# Patient Record
Sex: FEMALE | Race: WHITE | NOT HISPANIC OR LATINO | Employment: FULL TIME | URBAN - NONMETROPOLITAN AREA
[De-identification: names, ages, dates, MRNs, and addresses within clinical notes are randomized per-mention and may not be internally consistent; named-entity substitution may affect disease eponyms.]

---
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
## 2022-07-24 ENCOUNTER — HOSPITAL ENCOUNTER (EMERGENCY)
Facility: MEDICAL CENTER | Age: 22
End: 2022-07-24
Attending: EMERGENCY MEDICINE
Payer: MEDICAID

## 2022-07-24 VITALS
OXYGEN SATURATION: 95 % | HEIGHT: 65 IN | BODY MASS INDEX: 44.98 KG/M2 | SYSTOLIC BLOOD PRESSURE: 145 MMHG | RESPIRATION RATE: 18 BRPM | HEART RATE: 100 BPM | WEIGHT: 270 LBS | DIASTOLIC BLOOD PRESSURE: 84 MMHG | TEMPERATURE: 98 F

## 2022-07-24 DIAGNOSIS — H10.212 CHEMICAL CONJUNCTIVITIS OF LEFT EYE: ICD-10-CM

## 2022-07-24 DIAGNOSIS — I10 HYPERTENSION, UNSPECIFIED TYPE: ICD-10-CM

## 2022-07-24 PROCEDURE — 99284 EMERGENCY DEPT VISIT MOD MDM: CPT

## 2022-07-24 PROCEDURE — 700101 HCHG RX REV CODE 250: Performed by: EMERGENCY MEDICINE

## 2022-07-24 RX ORDER — PROPARACAINE HYDROCHLORIDE 5 MG/ML
1 SOLUTION/ DROPS OPHTHALMIC ONCE
Status: COMPLETED | OUTPATIENT
Start: 2022-07-24 | End: 2022-07-24

## 2022-07-24 RX ORDER — ERYTHROMYCIN 5 MG/G
1 OINTMENT OPHTHALMIC 3 TIMES DAILY
Qty: 3.5 G | Refills: 0 | Status: SHIPPED | OUTPATIENT
Start: 2022-07-24 | End: 2022-07-29

## 2022-07-24 RX ADMIN — FLUORESCEIN SODIUM 1 MG: 1 STRIP OPHTHALMIC at 20:28

## 2022-07-24 RX ADMIN — PROPARACAINE HYDROCHLORIDE 1 DROP: 5 SOLUTION/ DROPS OPHTHALMIC at 20:28

## 2022-07-25 NOTE — ED TRIAGE NOTES
"Chief Complaint   Patient presents with   • Eye Injury     Pt got  in left eye today at work. Pt flushed eye prior too EMS arrival. + Blurred vision. + burning pain. - redness.      BP (!) 180/91   Pulse (!) 108   Temp 36.7 °C (98 °F) (Temporal)   Resp 16   Ht 1.651 m (5' 5\")   Wt 122 kg (270 lb)   SpO2 96%   BMI 44.93 kg/m²     "
No

## 2022-07-25 NOTE — ED PROVIDER NOTES
ED Provider Note    Scribed for Sukhi Coello M.D. by Salvador Cuba. 7/24/2022, 7:38 PM.    Primary care provider: Pcp Pt States None  Means of arrival: EMS  History obtained from: Patient   History limited by: None     CHIEF COMPLAINT  Chief Complaint   Patient presents with   • Eye Injury     Pt got  in left eye today at work. Pt flushed eye prior too EMS arrival. + Blurred vision. + burning pain. - redness.      HPI  Katie Graham is a 22 y.o. female who presents to the Emergency Department for an acute eye injury which occurred at 4:40 PM. Patient reports she was at work when she got splashed in the eye with industrial strength . She states she then attempted to rinse out her left eye for 20 minutes. Patient states she then walked to a Urgent Care which was closed. Due to this and increasing discomfort in her left eye, the patient was prompted to call EMS who irrigated the eye with saline and then presented the patient to the ED. She reports associated blurred vision in the left eye, left eye burning and left eye pain. There are no exacerbating factors. She denies associated left eye redness, right eye pain, right eye redness.     REVIEW OF SYSTEMS  As above.    PAST MEDICAL HISTORY   has a past medical history of Allergy, Anxiety, Depression, Diabetes ([facility]), Family history of diabetes mellitus (11/4/2020), GERD (gastroesophageal reflux disease), Head ache, Migraine, and Substance abuse ([facility]).    SURGICAL HISTORY   has a past surgical history that includes laparoscopic umbilical hernia repair (2002).    SOCIAL HISTORY  Social History     Tobacco Use   • Smoking status: Former Smoker     Packs/day: 0.10     Start date: 8/14/2018     Quit date: 11/4/2018     Years since quitting: 3.7   • Smokeless tobacco: Never Used   • Tobacco comment: Quit after 3 months   Vaping Use   • Vaping Use: Former   • Substances: Flavoring, 0 anali    • Devices: Refillable tank   Substance Use Topics   •  Alcohol use: Not Currently     Alcohol/week: 3.6 oz     Types: 6 Shots of liquor per week     Comment: 6 shots every few months   • Drug use: Not Currently     Types: Marijuana, Inhaled     Comment: every couple of weeks       Social History     Substance and Sexual Activity   Drug Use Not Currently   • Types: Marijuana, Inhaled    Comment: every couple of weeks        FAMILY HISTORY  Family History   Problem Relation Age of Onset   • Diabetes Mother 30        Type 2   • Alcohol abuse Mother         sober 18 months   • Hypertension Father    • Alcohol abuse Father    • Kidney Disease Father    • Diabetes Sister         type 2   • Genetic Disorder Sister         club foot   • No Known Problems Brother    • Lung Disease Maternal Aunt    • Alcohol abuse Maternal Aunt    • Cancer Maternal Grandmother         Lung   • Alcohol abuse Maternal Grandmother    • Diabetes Maternal Grandfather         type 2   • Heart Disease Maternal Grandfather    • Hypertension Maternal Grandfather    • Hyperlipidemia Maternal Grandfather    • Alcohol abuse Maternal Grandfather    • Heart Disease Paternal Grandmother    • Hypertension Paternal Grandmother    • Hyperlipidemia Paternal Grandmother    • Alcohol abuse Paternal Grandfather    • No Known Problems Sister    • Cancer Maternal great-grandmother         breast       CURRENT MEDICATIONS  Current Outpatient Medications   Medication Instructions   • Alcohol Swabs Wipe site with prep pad prior to injection.   • ARIPiprazole (ABILIFY) 10 mg, Oral, DAILY   • Blood Glucose Meter Kit Test blood sugar as recommended by provider. One Touch Ultra Mini blood glucose monitoring kit.   • Blood Glucose Test Strips Use one One Touch Ultra Mini strip to test blood sugar once daily early morning before first meal.   • famotidine (PEPCID) 20 mg, Oral, DAILY   • ferrous sulfate 325 mg, Oral, DAILY   • glipiZIDE (GLUCOTROL) 5 mg, Oral, EVERY MORNING BEFORE BREAKFAST   • hydrOXYzine HCl (ATARAX) 25 MG Tab  "1 Tablet, Oral, 2 TIMES DAILY   • Lancets Use one One Touch Ultra Mini lancet to test blood sugar once daily early morning before first meal.   • oxcarbazepine (TRILEPTAL) 600 MG tablet 1 Tablet, Oral, 2 TIMES DAILY   • prazosin (MINIPRESS) 2 mg, Oral, EVERY EVENING     ALLERGIES  Allergies   Allergen Reactions   • Apple Photosensitivity and Unspecified     Causes migraines and fainting. Large quantity causes seizures made worse by exposure to light   • Pomegranate Extract [Punica] Hives       PHYSICAL EXAM  VITAL SIGNS: BP (!) 180/91   Pulse (!) 108   Temp 36.7 °C (98 °F) (Temporal)   Resp 16   Ht 1.651 m (5' 5\")   Wt 122 kg (270 lb)   SpO2 96%   BMI 44.93 kg/m²     Constitutional: Well developed, Well nourished, No acute distress, Non-toxic appearance.   HENT: Normocephalic, Atraumatic, Bilateral external ears normal, bilateral tympanic membranes normal, Oropharynx moist, No oral exudates, Nose normal.   Eyes:  Pupils are equal, round and reactive to light. Extraocular motions intact. pH between 7 and 8.    Lids/Lashes: Normal, no discharge   Sclera: Slightly erythematous on the left.   Cornea: Normal, pristine, no signs of abrasion.   Anterior Chamber: Normal   Angle: Normal   Visual Acuity: Left is 20/50t, Right is 20/30, Bilateral is 20/50  Neck: Supple, no cervical lymphadenopathy, no meningeal signs..   Lymphatic: No lymphadenopathy noted.   Cardiovascular: Regular rate and rhythm without murmurs gallops or rubs.   Thorax & Lungs: No respiratory distress. Lungs are clear to auscultation bilaterally, there are no wheezes no rales. Chest wall is nontender.  Skin: Warm, Dry, No erythema,   Extremities: Intact distal pulses, no clubbing, no cyanosis, no edema, nontender.    COURSE & MEDICAL DECISION MAKING  Pertinent Labs & Imaging studies reviewed. (See chart for details)    7:38 PM - Patient seen and examined at bedside. Discussed plan of care with patient. Patient is understanding and agreeable with " plan. Patient will be treated with Opthaine 0.5% 1 drop, fluorescein 1 strip. Poison control case number is [id number].     8:23 PM - Patient was reevaluated at bedside. Visual exam performed as detailed above. Advised patient to use moisturizing drops to help alleviate symptoms. Informed patient she will be prescribed eye ointment for her left eye discomfort. Further advised patient to follow up with occupational health or her eye doctor. I then informed the patient of my plan for discharge, which includes strict return precautions for any new or worsening symptoms. She understands and verbalizes agreement to plan of care. She is comfortable going home at this time.      Decision Making:  Patient presents for evaluation.  Clinically there is just some mild irritation the sclera slightly injected but no signs of corneal abrasions.  Initial pH upon evaluation was between 7 and 8 which is in the normal range.  At this point I did irrigate her out with another liter of normal saline.  Again repeat pH is normal.  I do feel at this point the patient is stable for discharge I will start her with some ophthalmic ointment of erythromycin.  Recommend to follow-up with occupational health tomorrow for recheck and follow-up with optometrist or ophthalmologist as needed.    The patient will return for new or worsening symptoms and is stable at the time of discharge.    The patient is referred to a primary physician for blood pressure management, diabetic screening, and for all other preventative health concerns.    DISPOSITION:  Patient will be discharged home in stable condition.    FOLLOW UP:  [facility]  [address] [facility] [address] [phone]        Sean Prado M.D.  [address]  [facility] [address]  [phone]            OUTPATIENT MEDICATIONS:  Discharge Medication List as of 7/24/2022  8:45 PM      START taking these medications    Details   erythromycin 5 MG/GM Ointment Apply 1 cm to left  eye 3 times a day for 5 days., Disp-3.5 g, R-0, Normal               FINAL IMPRESSION  1. Chemical conjunctivitis of left eye    2. Hypertension, unspecified type          I, Salvador Cuba (Joseibliz), am scribing for, and in the presence of, Sukhi Coello M.D..    Electronically signed by: Salvador Cuba (Scribe), 7/24/2022    ISukhi M.D. personally performed the services described in this documentation, as scribed by Salvador Cuba in my presence, and it is both accurate and complete.    The note accurately reflects work and decisions made by me.  Sukhi Coello M.D.  7/24/2022  10:12 PM

## 2022-07-25 NOTE — ED NOTES
Discharge instructions discussed with pt, verbalizes understanding. All belongings with pt when leaving unit. Pt amb to lobby with steady gait.

## 2022-09-10 ENCOUNTER — APPOINTMENT (OUTPATIENT)
Dept: RADIOLOGY | Facility: MEDICAL CENTER | Age: 22
End: 2022-09-10
Attending: STUDENT IN AN ORGANIZED HEALTH CARE EDUCATION/TRAINING PROGRAM
Payer: MEDICAID

## 2022-09-10 ENCOUNTER — HOSPITAL ENCOUNTER (EMERGENCY)
Facility: MEDICAL CENTER | Age: 22
End: 2022-09-11
Attending: STUDENT IN AN ORGANIZED HEALTH CARE EDUCATION/TRAINING PROGRAM
Payer: MEDICAID

## 2022-09-10 DIAGNOSIS — K76.9 HEPATOCELLULAR INJURY: ICD-10-CM

## 2022-09-10 DIAGNOSIS — E86.0 DEHYDRATION: ICD-10-CM

## 2022-09-10 DIAGNOSIS — R55 SYNCOPE AND COLLAPSE: ICD-10-CM

## 2022-09-10 DIAGNOSIS — E87.1 HYPONATREMIA: ICD-10-CM

## 2022-09-10 DIAGNOSIS — R40.4 TRANSIENT ALTERATION OF AWARENESS: ICD-10-CM

## 2022-09-10 DIAGNOSIS — U07.1 COVID-19: ICD-10-CM

## 2022-09-10 LAB
ALBUMIN SERPL BCP-MCNC: 4.1 G/DL (ref 3.2–4.9)
ALBUMIN/GLOB SERPL: 1.1 G/DL
ALP SERPL-CCNC: 59 U/L (ref 30–99)
ALT SERPL-CCNC: 145 U/L (ref 2–50)
ANION GAP SERPL CALC-SCNC: 14 MMOL/L (ref 7–16)
APPEARANCE UR: CLEAR
AST SERPL-CCNC: 111 U/L (ref 12–45)
BASOPHILS # BLD AUTO: 0.7 % (ref 0–1.8)
BASOPHILS # BLD: 0.04 K/UL (ref 0–0.12)
BILIRUB SERPL-MCNC: 0.3 MG/DL (ref 0.1–1.5)
BILIRUB UR QL STRIP.AUTO: NEGATIVE
BUN SERPL-MCNC: 7 MG/DL (ref 8–22)
CALCIUM SERPL-MCNC: 8.9 MG/DL (ref 8.5–10.5)
CHLORIDE SERPL-SCNC: 100 MMOL/L (ref 96–112)
CO2 SERPL-SCNC: 20 MMOL/L (ref 20–33)
COLOR UR: YELLOW
CREAT SERPL-MCNC: 0.98 MG/DL (ref 0.5–1.4)
EKG IMPRESSION: NORMAL
EOSINOPHIL # BLD AUTO: 0.01 K/UL (ref 0–0.51)
EOSINOPHIL NFR BLD: 0.2 % (ref 0–6.9)
ERYTHROCYTE [DISTWIDTH] IN BLOOD BY AUTOMATED COUNT: 43.1 FL (ref 35.9–50)
FLUAV RNA SPEC QL NAA+PROBE: NEGATIVE
FLUBV RNA SPEC QL NAA+PROBE: NEGATIVE
GFR SERPLBLD CREATININE-BSD FMLA CKD-EPI: 85 ML/MIN/1.73 M 2
GLOBULIN SER CALC-MCNC: 3.9 G/DL (ref 1.9–3.5)
GLUCOSE SERPL-MCNC: 115 MG/DL (ref 65–99)
GLUCOSE UR STRIP.AUTO-MCNC: NEGATIVE MG/DL
HCT VFR BLD AUTO: 37.8 % (ref 37–47)
HGB BLD-MCNC: 12.9 G/DL (ref 12–16)
IMM GRANULOCYTES # BLD AUTO: 0.03 K/UL (ref 0–0.11)
IMM GRANULOCYTES NFR BLD AUTO: 0.5 % (ref 0–0.9)
KETONES UR STRIP.AUTO-MCNC: ABNORMAL MG/DL
LACTATE SERPL-SCNC: 1.3 MMOL/L (ref 0.5–2)
LEUKOCYTE ESTERASE UR QL STRIP.AUTO: NEGATIVE
LYMPHOCYTES # BLD AUTO: 0.82 K/UL (ref 1–4.8)
LYMPHOCYTES NFR BLD: 14.8 % (ref 22–41)
MCH RBC QN AUTO: 28.2 PG (ref 27–33)
MCHC RBC AUTO-ENTMCNC: 34.1 G/DL (ref 33.6–35)
MCV RBC AUTO: 82.5 FL (ref 81.4–97.8)
MICRO URNS: ABNORMAL
MONOCYTES # BLD AUTO: 0.83 K/UL (ref 0–0.85)
MONOCYTES NFR BLD AUTO: 15 % (ref 0–13.4)
NEUTROPHILS # BLD AUTO: 3.82 K/UL (ref 2–7.15)
NEUTROPHILS NFR BLD: 68.8 % (ref 44–72)
NITRITE UR QL STRIP.AUTO: NEGATIVE
NRBC # BLD AUTO: 0 K/UL
NRBC BLD-RTO: 0 /100 WBC
PH UR STRIP.AUTO: 5.5 [PH] (ref 5–8)
PLATELET # BLD AUTO: 269 K/UL (ref 164–446)
PMV BLD AUTO: 10.5 FL (ref 9–12.9)
POTASSIUM SERPL-SCNC: 3.7 MMOL/L (ref 3.6–5.5)
PROT SERPL-MCNC: 8 G/DL (ref 6–8.2)
PROT UR QL STRIP: NEGATIVE MG/DL
RBC # BLD AUTO: 4.58 M/UL (ref 4.2–5.4)
RBC UR QL AUTO: NEGATIVE
RSV RNA SPEC QL NAA+PROBE: NEGATIVE
SARS-COV-2 RNA RESP QL NAA+PROBE: DETECTED
SODIUM SERPL-SCNC: 134 MMOL/L (ref 135–145)
SP GR UR STRIP.AUTO: 1.01
SPECIMEN SOURCE: ABNORMAL
UROBILINOGEN UR STRIP.AUTO-MCNC: 0.2 MG/DL
WBC # BLD AUTO: 5.6 K/UL (ref 4.8–10.8)

## 2022-09-10 PROCEDURE — 99285 EMERGENCY DEPT VISIT HI MDM: CPT

## 2022-09-10 PROCEDURE — 87040 BLOOD CULTURE FOR BACTERIA: CPT

## 2022-09-10 PROCEDURE — 93005 ELECTROCARDIOGRAM TRACING: CPT

## 2022-09-10 PROCEDURE — 80053 COMPREHEN METABOLIC PANEL: CPT

## 2022-09-10 PROCEDURE — 85025 COMPLETE CBC W/AUTO DIFF WBC: CPT

## 2022-09-10 PROCEDURE — 87086 URINE CULTURE/COLONY COUNT: CPT

## 2022-09-10 PROCEDURE — 70450 CT HEAD/BRAIN W/O DYE: CPT

## 2022-09-10 PROCEDURE — 0241U HCHG SARS-COV-2 COVID-19 NFCT DS RESP RNA 4 TRGT MIC: CPT

## 2022-09-10 PROCEDURE — 83605 ASSAY OF LACTIC ACID: CPT

## 2022-09-10 PROCEDURE — 36415 COLL VENOUS BLD VENIPUNCTURE: CPT

## 2022-09-10 PROCEDURE — C9803 HOPD COVID-19 SPEC COLLECT: HCPCS | Performed by: STUDENT IN AN ORGANIZED HEALTH CARE EDUCATION/TRAINING PROGRAM

## 2022-09-10 PROCEDURE — 81003 URINALYSIS AUTO W/O SCOPE: CPT

## 2022-09-10 PROCEDURE — 700105 HCHG RX REV CODE 258: Performed by: STUDENT IN AN ORGANIZED HEALTH CARE EDUCATION/TRAINING PROGRAM

## 2022-09-10 PROCEDURE — 71045 X-RAY EXAM CHEST 1 VIEW: CPT

## 2022-09-10 RX ORDER — SODIUM CHLORIDE, SODIUM LACTATE, POTASSIUM CHLORIDE, CALCIUM CHLORIDE 600; 310; 30; 20 MG/100ML; MG/100ML; MG/100ML; MG/100ML
1000 INJECTION, SOLUTION INTRAVENOUS ONCE
Status: COMPLETED | OUTPATIENT
Start: 2022-09-10 | End: 2022-09-11

## 2022-09-10 RX ADMIN — SODIUM CHLORIDE, POTASSIUM CHLORIDE, SODIUM LACTATE AND CALCIUM CHLORIDE 1000 ML: 600; 310; 30; 20 INJECTION, SOLUTION INTRAVENOUS at 20:06

## 2022-09-11 VITALS
SYSTOLIC BLOOD PRESSURE: 155 MMHG | BODY MASS INDEX: 46.65 KG/M2 | HEIGHT: 65 IN | OXYGEN SATURATION: 96 % | TEMPERATURE: 98.1 F | WEIGHT: 280 LBS | HEART RATE: 97 BPM | RESPIRATION RATE: 20 BRPM | DIASTOLIC BLOOD PRESSURE: 87 MMHG

## 2022-09-11 NOTE — DISCHARGE INSTRUCTIONS
Please call Dr. Solorio's office in order to schedule follow-up.  I am not exactly sure why you passed out today or do not have a memory of it and it is important that you follow-up with a neurologist in order to obtain more testing.  Please return to the ER immediately if you have any recurrent episodes, have any persistent confusion, headache, fevers or any new or acute concern.

## 2022-09-11 NOTE — ED TRIAGE NOTES
Katie Graham  22 y.o.  Chief Complaint   Patient presents with    ALOC     BIBA for above, pt reporting getting off work around 1700 this afternoon and woke up to lying on the floor with her coworker looking over her. Per EMS, coworkers found pt wandering around parking lot and was not responsive to them. Pt A&Ox4, GCS 15 at this time. Pt reporting similar episode yesterday.

## 2022-09-11 NOTE — ED PROVIDER NOTES
ED Provider Note    CHIEF COMPLAINT  Chief Complaint   Patient presents with    ALOC       HPI  Katie Graham is a 22 y.o. female who presents after brief episode of loss of consciousness.  She states that she was at work around 5 PM and she remembers bringing her coworkers soda.  Next thing she remembers waking up on the ground around 830. Per triage she was seen wondering around outside. She does not remember anything that happened in between.  She states that she has had an otherwise pretty normal day she did have an asthma exacerbation this morning that was triggered by client with severest tobacco smoke.  She is also had a 4 /10, constant, bifrontal temporal headache.  She states that she has had a history of headaches before.  She did use a nicotine vape earlier today but otherwise denies any drinking or drug use.  She states that she thinks she had a similar episode yesterday where she had a brief fall but she says she does not have any memory loss surrounding the event.  She felt warm earlier today but denies any chills.  She feels nauseous but denies any vomiting.  She has had coughing in the setting of her asthma exacerbation.    REVIEW OF SYSTEMS  As per HPI, otherwise a 10 point review of systems is negative    PAST MEDICAL HISTORY  Past Medical History:   Diagnosis Date    Allergy     Apples cause migrains and fainting    Anxiety     Depression     Diabetes (HCC)     Family history of diabetes mellitus 11/4/2020    GERD (gastroesophageal reflux disease)     Head ache     Migraine     Substance abuse (HCC)     xanax and alcohol in highschool       SOCIAL HISTORY  Social History     Tobacco Use    Smoking status: Former     Packs/day: 0.10     Types: Cigarettes     Start date: 8/14/2018     Quit date: 11/4/2018     Years since quitting: 3.8    Smokeless tobacco: Never    Tobacco comments:     Quit after 3 months   Vaping Use    Vaping Use: Some days    Substances: Nicotine, Flavoring, 0 anali     Devices:  "[employer] tank   Substance Use Topics    Alcohol use: Not Currently     Alcohol/week: 3.6 oz     Types: 6 Shots of liquor per week     Comment: 6 shots every few months    Drug use: Not Currently     Types: Marijuana, Inhaled     Comment: every couple of weeks        SURGICAL HISTORY  Past Surgical History:   Procedure Laterality Date    LAPAROSCOPIC UMBILICAL HERNIA REPAIR  2002       CURRENT MEDICATIONS  Home Medications       Reviewed by Zully Hood R.N. (Registered Nurse) on 09/10/22 at 1934  Med List Status: Not Addressed     Medication Last Dose Status   Alcohol Swabs  Active   ARIPiprazole (ABILIFY) 10 MG Tab  Active   Blood Glucose Meter Kit  Active   Blood Glucose Test Strips  Active   famotidine (PEPCID) 20 MG Tab  Active   ferrous sulfate 325 (65 Fe) MG tablet  Active   glipiZIDE (GLUCOTROL) 5 MG Tab  Active   hydrOXYzine HCl (ATARAX) 25 MG Tab  Active   Lancets  Active   oxcarbazepine (TRILEPTAL) 600 MG tablet  Active   prazosin (MINIPRESS) 2 MG Cap  Active                    ALLERGIES  Allergies   Allergen Reactions    Apple Photosensitivity and Unspecified     Causes migraines and fainting. Large quantity causes seizures made worse by exposure to light    Pomegranate Extract [Punica] Hives       PHYSICAL EXAM  VITAL SIGNS: BP (!) 155/87   Pulse 97   Temp 36.7 °C (98.1 °F) (Temporal)   Resp 20   Ht 1.651 m (5' 5\")   Wt (!) 127 kg (280 lb)   SpO2 96%   BMI 46.59 kg/m²    Constitutional: Awake and alert . Non toxic  HENT: Normal inspection  Eyes: Normal inspection  Neck: Grossly normal range of motion.  Cardiovascular: Normal heart rate, Normal rhythm.  Symmetric peripheral pulses.   Thorax & Lungs: No respiratory distress, No wheezing, No rales, No rhonchi, No chest tenderness.   Abdomen: Soft, non-distended, nontender, no mass  Skin: No obvious rash.  Back: No tenderness, No CVA tenderness.   Extremities: Warm, well perfused. No clubbing, cyanosis, edema,   Neurologic:  A&O x 4  CN " II-XII tested and intact.  Sensation intact to light touch in all extremities.  Motor: Normal tone and bulk. No abnormal movements appreciated. No pronator drift. Strength tested and 5/5 in bilateral wrist flexion/extension, elbow flexion/extension, shoulder abduction, straight leg raise, knee flexion/extension, ankle dorsiflexion/plantarflexion. Patient ambulates with a steady gait.  Coordination: Finger to nose and heel to shin testing intact bilaterally.  Psychiatric: Normal for situation    RADIOLOGY/PROCEDURES  DX-CHEST-PORTABLE (1 VIEW)   Final Result      No evidence of acute cardiopulmonary process.      CT-HEAD W/O   Final Result      No acute intracranial findings.                    Imaging is interpreted by radiologist    Labs:  Results for orders placed or performed during the hospital encounter of 09/10/22   Lactic acid (lactate)   Result Value Ref Range    Lactic Acid 1.3 0.5 - 2.0 mmol/L   CBC WITH DIFFERENTIAL   Result Value Ref Range    WBC 5.6 4.8 - 10.8 K/uL    RBC 4.58 4.20 - 5.40 M/uL    Hemoglobin 12.9 12.0 - 16.0 g/dL    Hematocrit 37.8 37.0 - 47.0 %    MCV 82.5 81.4 - 97.8 fL    MCH 28.2 27.0 - 33.0 pg    MCHC 34.1 33.6 - 35.0 g/dL    RDW 43.1 35.9 - 50.0 fL    Platelet Count 269 164 - 446 K/uL    MPV 10.5 9.0 - 12.9 fL    Neutrophils-Polys 68.80 44.00 - 72.00 %    Lymphocytes 14.80 (L) 22.00 - 41.00 %    Monocytes 15.00 (H) 0.00 - 13.40 %    Eosinophils 0.20 0.00 - 6.90 %    Basophils 0.70 0.00 - 1.80 %    Immature Granulocytes 0.50 0.00 - 0.90 %    Nucleated RBC 0.00 /100 WBC    Neutrophils (Absolute) 3.82 2.00 - 7.15 K/uL    Lymphs (Absolute) 0.82 (L) 1.00 - 4.80 K/uL    Monos (Absolute) 0.83 0.00 - 0.85 K/uL    Eos (Absolute) 0.01 0.00 - 0.51 K/uL    Baso (Absolute) 0.04 0.00 - 0.12 K/uL    Immature Granulocytes (abs) 0.03 0.00 - 0.11 K/uL    NRBC (Absolute) 0.00 K/uL   COMP METABOLIC PANEL   Result Value Ref Range    Sodium 134 (L) 135 - 145 mmol/L    Potassium 3.7 3.6 - 5.5 mmol/L     Chloride 100 96 - 112 mmol/L    Co2 20 20 - 33 mmol/L    Anion Gap 14.0 7.0 - 16.0    Glucose 115 (H) 65 - 99 mg/dL    Bun 7 (L) 8 - 22 mg/dL    Creatinine 0.98 0.50 - 1.40 mg/dL    Calcium 8.9 8.5 - 10.5 mg/dL    AST(SGOT) 111 (H) 12 - 45 U/L    ALT(SGPT) 145 (H) 2 - 50 U/L    Alkaline Phosphatase 59 30 - 99 U/L    Total Bilirubin 0.3 0.1 - 1.5 mg/dL    Albumin 4.1 3.2 - 4.9 g/dL    Total Protein 8.0 6.0 - 8.2 g/dL    Globulin 3.9 (H) 1.9 - 3.5 g/dL    A-G Ratio 1.1 g/dL   URINALYSIS    Specimen: Urine   Result Value Ref Range    Color Yellow     Character Clear     Specific Gravity 1.014 <1.035    Ph 5.5 5.0 - 8.0    Glucose Negative Negative mg/dL    Ketones Trace (A) Negative mg/dL    Protein Negative Negative mg/dL    Bilirubin Negative Negative    Urobilinogen, Urine 0.2 Negative    Nitrite Negative Negative    Leukocyte Esterase Negative Negative    Occult Blood Negative Negative    Micro Urine Req see below    COV-2, FLU A/B, AND RSV BY PCR (2-4 HOURS CEPHEID): Collect NP swab in VTM    Specimen: Respirate   Result Value Ref Range    Influenza virus A RNA Negative Negative    Influenza virus B, PCR Negative Negative    RSV, PCR Negative Negative    SARS-CoV-2 by PCR DETECTED (AA)     SARS-CoV-2 Source NP Swab    ESTIMATED GFR   Result Value Ref Range    GFR (CKD-EPI) 85 >60 mL/min/1.73 m 2   EKG   Result Value Ref Range    Report       [facility] Emergency Dept[facility]    Test Date:  2022-09-10  Pt Name:    IDALMIS NELSON              Department: ER  MRN:        [id number]                      Room:        24  Gender:     Female                       Technician: [id number]  :        2000                   Requested By:DELFIN COLINDRES  Order #:    [id number]                    Reading MD:    Measurements  Intervals                                Axis  Rate:       114                          P:          18  CA:         141                          QRS:        -14  QRSD:       94                            T:          8  QT:         333  QTc:        459    Interpretive Statements  Sinus tachycardia  No previous ECG available for comparison       The EKG was reviewed by me and interpreted as documented above      Medications   lactated ringers (LR) bolus (0 mL Intravenous Stopped 9/11/22 0003)       Measures:  HYDRATION: Based on the patient's presentation of Dehydration the patient was given IV fluids. IV Hydration was used because oral hydration was not adequate alone. Upon recheck following hydration, the patient was improved.    COURSE & MEDICAL DECISION MAKING    This is a 22-year-old who presents for brief episode of altered awareness.  On arrival she is tachycardic, afebrile and mentating normally.  She has no neurological deficits.  Differential is broad and includes syncope, seizure, electrolyte derangement, TIA, CVA, intracranial mass or hemorrhage among others.  I have low suspicion for CNS infection as she is afebrile without any altered mental status or meningismus.  EKG without dysrhythmia, ischemia, prolonged QT, delta wave, bruggada pattern, episolon wave, RH strain or LVH.  She denies any chest pain or shortness of breath and I do not suspect pulmonary embolism, ACS or other acute cardiopulmonary process.  Her labs are notable for sodium of 134, no urinary tract infection, no leukocytosis and a normal lactate.  Her LFTs are also mildly elevated though this appears to be chronic.  CT head without evidence of intracranial pathology.  Given her tachycardia she was given a liter of fluid with significant improvement.  Her COVID swab later resulted as positive which likely explains her presentation today.   I did consult Dr. Galan with neurology to discuss if there would be any utility for obtaining an MRI or an EEG as an inpatient .  He feels this would be low yield given that she is and continues to be neurologically normal during her time in the ER.   I overall do agree with this and feel that  she can be safely discharged and managed outpatient.  The contact information for Dr. Galan was provided and she was instructed to follow-up closely as an outpatient.  She was advised to quarantine at home until symptom resolution and return sooner if worsening..      FINAL IMPRESSION  1. Dehydration Acute       2. Syncope and collapse Acute       3. Transient alteration of awareness Acute       4. Hyponatremia Acute       5. Hepatocellular injury        6. COVID-19 Acute               This dictation was created using voice recognition software. The accuracy of the dictation is limited to the abilities of the software.  The nursing notes were reviewed and certain aspects of this information were incorporated into this note.      Electronically signed by: Selin Brennan M.D., 9/10/2022 8:06 PM

## 2022-09-11 NOTE — ED NOTES
Assist RN:   Phleb at bedside to draw second set of blood cultures.  PIV established and labs & first set of cultures drawn and sent.

## 2022-09-13 LAB
BACTERIA UR CULT: NORMAL
SIGNIFICANT IND 70042: NORMAL
SITE SITE: NORMAL
SOURCE SOURCE: NORMAL

## 2022-09-15 LAB
BACTERIA BLD CULT: NORMAL
BACTERIA BLD CULT: NORMAL
SIGNIFICANT IND 70042: NORMAL
SIGNIFICANT IND 70042: NORMAL
SITE SITE: NORMAL
SITE SITE: NORMAL
SOURCE SOURCE: NORMAL
SOURCE SOURCE: NORMAL

## 2023-01-27 ENCOUNTER — OFFICE VISIT (OUTPATIENT)
Dept: URGENT CARE | Facility: PHYSICIAN GROUP | Age: 23
End: 2023-01-27
Payer: MEDICAID

## 2023-01-27 ENCOUNTER — APPOINTMENT (OUTPATIENT)
Dept: RADIOLOGY | Facility: IMAGING CENTER | Age: 23
End: 2023-01-27
Attending: FAMILY MEDICINE
Payer: MEDICAID

## 2023-01-27 VITALS
SYSTOLIC BLOOD PRESSURE: 128 MMHG | BODY MASS INDEX: 46.65 KG/M2 | OXYGEN SATURATION: 95 % | DIASTOLIC BLOOD PRESSURE: 82 MMHG | RESPIRATION RATE: 18 BRPM | HEART RATE: 106 BPM | TEMPERATURE: 98.3 F | HEIGHT: 65 IN | WEIGHT: 280 LBS

## 2023-01-27 DIAGNOSIS — Q66.89 CLUBFOOT OF LEFT LOWER EXTREMITY: ICD-10-CM

## 2023-01-27 DIAGNOSIS — S99.912A INJURY OF LEFT ANKLE, INITIAL ENCOUNTER: ICD-10-CM

## 2023-01-27 DIAGNOSIS — S93.402A SPRAIN OF LEFT ANKLE, UNSPECIFIED LIGAMENT, INITIAL ENCOUNTER: ICD-10-CM

## 2023-01-27 DIAGNOSIS — M25.572 LEFT ANKLE PAIN, UNSPECIFIED CHRONICITY: ICD-10-CM

## 2023-01-27 PROCEDURE — 99214 OFFICE O/P EST MOD 30 MIN: CPT | Performed by: FAMILY MEDICINE

## 2023-01-27 PROCEDURE — 73610 X-RAY EXAM OF ANKLE: CPT | Mod: TC,FY,LT | Performed by: FAMILY MEDICINE

## 2023-01-27 ASSESSMENT — FIBROSIS 4 INDEX: FIB4 SCORE: 0.69

## 2023-01-27 NOTE — PROGRESS NOTES
Chief Complaint:    Chief Complaint   Patient presents with    Ankle Pain     Left ankle, fell down the stairs this morning. Hx club foot.         History of Present Illness:    Already had some left ankle problems going on, but then she fell down stairs 2 hours ago, making left ankle pain worse, lateral aspect. Took Ibuprofen 6 hours ago due to previous left ankle problem before the fall today.    Was seeing Podiatry at age 19, then aged out because turned 20. Was supposed to get SMO (ankle brace) and possibly surgery due to club foot-related condition / flat top talus. Would like to see Podiatry again as she has been having left ankle pain x 1 month.      Past Medical History:    Past Medical History:   Diagnosis Date    Allergy     Apples cause migrains and fainting    Anxiety     Depression     Diabetes ([facility])     Family history of diabetes mellitus 2020    GERD (gastroesophageal reflux disease)     Head ache     Migraine     Substance abuse ([facility])     xanax and alcohol in highschool     Past Surgical History:    Past Surgical History:   Procedure Laterality Date    LAPAROSCOPIC UMBILICAL HERNIA REPAIR       Social History:    Social History     Socioeconomic History    Marital status: Single     Spouse name: Not on file    Number of children: 0    Years of education: Not on file    Highest education level: 12th grade   Occupational History    Occupation: unemployed   Tobacco Use    Smoking status: Former     Packs/day: 0.10     Types: Cigarettes     Start date: 2018     Quit date: 2018     Years since quittin.2    Smokeless tobacco: Never    Tobacco comments:     Quit after 3 months   Vaping Use    Vaping Use: Some days    Substances: Nicotine, Flavoring, 0 anali     Devices: Refillable tank   Substance and Sexual Activity    Alcohol use: Not Currently     Alcohol/week: 3.6 oz     Types: 6 Shots of liquor per week     Comment: 6 shots every few months    Drug use: Not Currently     Types:  Marijuana, Inhaled     Comment: every couple of weeks     Sexual activity: Not Currently     Partners: Female, Male     Birth control/protection: Condom Male   Other Topics Concern    Behavioral problems Not Asked    Interpersonal relationships Not Asked    Sad or not enjoying activities Not Asked    Suicidal thoughts Not Asked    Poor school performance Not Asked    Reading difficulties Not Asked    Speech difficulties Not Asked    Writing difficulties Not Asked    Inadequate sleep Not Asked    Excessive TV viewing Not Asked    Excessive video game use Not Asked    Inadequate exercise Not Asked    Sports related Not Asked    Poor diet Not Asked    Family concerns for drug/alcohol abuse Not Asked    Poor oral hygiene Not Asked    Bike safety Not Asked    Family concerns vehicle safety Not Asked   Social History Narrative    Not on file     Social Determinants of Health     Financial Resource Strain: Not on file   Food Insecurity: Not on file   Transportation Needs: Not on file   Physical Activity: Not on file   Stress: Not on file   Social Connections: Not on file   Intimate Partner Violence: Not on file   Housing Stability: Not on file     Family History:    Family History   Problem Relation Age of Onset    Diabetes Mother 30        Type 2    Alcohol abuse Mother         sober 18 months    Hypertension Father     Alcohol abuse Father     Kidney Disease Father     Diabetes Sister         type 2    Genetic Disorder Sister         club foot    No Known Problems Brother     Lung Disease Maternal Aunt     Alcohol abuse Maternal Aunt     Cancer Maternal Grandmother         Lung    Alcohol abuse Maternal Grandmother     Diabetes Maternal Grandfather         type 2    Heart Disease Maternal Grandfather     Hypertension Maternal Grandfather     Hyperlipidemia Maternal Grandfather     Alcohol abuse Maternal Grandfather     Heart Disease Paternal Grandmother     Hypertension Paternal Grandmother     Hyperlipidemia Paternal  "Grandmother     Alcohol abuse Paternal Grandfather     No Known Problems Sister     Cancer Maternal great-grandmother         breast     Medications:    No current outpatient medications on file prior to visit.     No current facility-administered medications on file prior to visit.     Allergies:    Allergies   Allergen Reactions    Apple Photosensitivity and Unspecified     Causes migraines and fainting. Large quantity causes seizures made worse by exposure to light    Pomegranate Extract [Punica] Hives       Vitals:    Vitals:    01/27/23 1103   BP: 128/82   Pulse: (!) 106   Resp: 18   Temp: 36.8 °C (98.3 °F)   TempSrc: Temporal   SpO2: 95%   Weight: (!) 127 kg (280 lb)   Height: 1.651 m (5' 5\")       Physical Exam:    Constitutional: Vital signs reviewed. Appears well-developed and well-nourished. No acute distress.   Eyes: Sclera white, conjunctivae clear.  ENT: External ears normal. Hearing normal.  Pulmonary/Chest: Respirations non-labored.  Musculoskeletal: Left ankle is most tender lateral aspect.  Neurological: Alert and oriented to person, place, and time. Muscle tone normal. Coordination normal. Light touch and sensation normal.   Skin: No rashes or lesions. Warm, dry, normal turgor.  Psychiatric: Normal mood and affect. Behavior is normal. Judgment and thought content normal.       Diagnostics:    DX-ANKLE 3+ VIEWS LEFT  Order: [id number]  Status: Final result     Visible to patient: Yes (seen)     Next appt: None     Dx: Injury of left ankle, initial encounter     0 Result Notes  Details    Reading Physician Reading Date Result Priority   Georges Rodriguez M.D.  [phone] 1/27/2023 Urgent Care     Narrative & Impression     1/27/2023 11:13 AM     HISTORY/REASON FOR EXAM: Pain/Deformity Following Trauma; Room 4. Fell down stairs 2 hours ago. Hurts lateral aspect.     TECHNIQUE/EXAM DESCRIPTION AND NUMBER OF VIEWS: 3 nonweightbearing views of the LEFT ankle.     COMPARISON: 2/24/2021   "   FINDINGS:  There is obliquity on the lateral obscuring some detail     There is generalized soft tissue swelling.     There is no acute displaced fracture. The ankle mortise is symmetric and there is no syndesmotic widening.     IMPRESSION:     Normal ankle series aside from generalized soft tissue swelling.     I personally reviewed the images. Images and Rad report reviewed with her and copy of report to her.       Medical Decision Makin. Injury of left ankle, initial encounter  - DX-ANKLE 3+ VIEWS LEFT; Future    2. Sprain of left ankle, unspecified ligament, initial encounter    3. Clubfoot of left lower extremity  - Referral to Podiatry    4. Left ankle pain, unspecified chronicity  - Referral to Podiatry       Discussed with her DDX, management options, and risks, benefits, and alternatives to treatment plan agreed upon.    Already had some left ankle problems going on, but then she fell down stairs 2 hours ago, making left ankle pain worse, lateral aspect. Took Ibuprofen 6 hours ago due to previous left ankle problem before the fall today.    Was seeing Podiatry at age 19, then aged out because turned 20. Was supposed to get SMO (ankle brace) and possibly surgery due to club foot-related condition / flat top talus. Would like to see Podiatry again as she has been having left ankle pain x 1 month.    Left ankle is most tender lateral aspect.    Left ankle x-rays: Normal ankle series aside from generalized soft tissue swelling.    Rec'd relative rest. Hinged ankle brace to wear on left ankle provided to use as needed.    May take over-the-counter Ibuprofen (Motrin or Advil) as needed for pain and swelling for anti-inflammatory effect.    Referral to Podiatry ordered for previous chronic condition: Was seeing Podiatry at age 19, then aged out because turned 20. Was supposed to get SMO (ankle brace) and possibly surgery due to club foot-related condition / flat top talus. Would like to see Podiatry again  as she has been having left ankle pain x 1 month.    She will return to urgent care if needed.

## 2023-08-31 ENCOUNTER — APPOINTMENT (OUTPATIENT)
Dept: RADIOLOGY | Facility: MEDICAL CENTER | Age: 23
End: 2023-08-31
Attending: EMERGENCY MEDICINE
Payer: MEDICAID

## 2023-08-31 ENCOUNTER — HOSPITAL ENCOUNTER (EMERGENCY)
Facility: MEDICAL CENTER | Age: 23
End: 2023-08-31
Attending: EMERGENCY MEDICINE
Payer: MEDICAID

## 2023-08-31 VITALS
HEART RATE: 87 BPM | DIASTOLIC BLOOD PRESSURE: 83 MMHG | BODY MASS INDEX: 48.82 KG/M2 | HEIGHT: 64 IN | WEIGHT: 285.94 LBS | TEMPERATURE: 98.8 F | SYSTOLIC BLOOD PRESSURE: 136 MMHG | OXYGEN SATURATION: 97 % | RESPIRATION RATE: 18 BRPM

## 2023-08-31 DIAGNOSIS — N93.9 VAGINAL BLEEDING: ICD-10-CM

## 2023-08-31 DIAGNOSIS — N39.0 ACUTE UTI: ICD-10-CM

## 2023-08-31 DIAGNOSIS — K50.018 TERMINAL ILEITIS WITH OTHER COMPLICATION (HCC): ICD-10-CM

## 2023-08-31 DIAGNOSIS — R10.9 ABDOMINAL CRAMPING: ICD-10-CM

## 2023-08-31 LAB
ALBUMIN SERPL BCP-MCNC: 4.1 G/DL (ref 3.2–4.9)
ALBUMIN/GLOB SERPL: 1.1 G/DL
ALP SERPL-CCNC: 69 U/L (ref 30–99)
ALT SERPL-CCNC: 22 U/L (ref 2–50)
ANION GAP SERPL CALC-SCNC: 11 MMOL/L (ref 7–16)
APPEARANCE UR: ABNORMAL
AST SERPL-CCNC: 15 U/L (ref 12–45)
B-HCG SERPL-ACNC: <1 MIU/ML (ref 0–5)
BACTERIA #/AREA URNS HPF: ABNORMAL /HPF
BASOPHILS # BLD AUTO: 0.7 % (ref 0–1.8)
BASOPHILS # BLD: 0.06 K/UL (ref 0–0.12)
BILIRUB SERPL-MCNC: <0.2 MG/DL (ref 0.1–1.5)
BILIRUB UR QL STRIP.AUTO: NEGATIVE
BUN SERPL-MCNC: 11 MG/DL (ref 8–22)
CALCIUM ALBUM COR SERPL-MCNC: 9.5 MG/DL (ref 8.5–10.5)
CALCIUM SERPL-MCNC: 9.6 MG/DL (ref 8.5–10.5)
CHLORIDE SERPL-SCNC: 109 MMOL/L (ref 96–112)
CO2 SERPL-SCNC: 21 MMOL/L (ref 20–33)
COLOR UR: ABNORMAL
CREAT SERPL-MCNC: 0.66 MG/DL (ref 0.5–1.4)
EOSINOPHIL # BLD AUTO: 0.13 K/UL (ref 0–0.51)
EOSINOPHIL NFR BLD: 1.6 % (ref 0–6.9)
EPI CELLS #/AREA URNS HPF: ABNORMAL /HPF
ERYTHROCYTE [DISTWIDTH] IN BLOOD BY AUTOMATED COUNT: 43.9 FL (ref 35.9–50)
GFR SERPLBLD CREATININE-BSD FMLA CKD-EPI: 128 ML/MIN/1.73 M 2
GLOBULIN SER CALC-MCNC: 3.9 G/DL (ref 1.9–3.5)
GLUCOSE SERPL-MCNC: 112 MG/DL (ref 65–99)
GLUCOSE UR STRIP.AUTO-MCNC: NEGATIVE MG/DL
HCG SERPL QL: NEGATIVE
HCT VFR BLD AUTO: 38.3 % (ref 37–47)
HGB BLD-MCNC: 12.9 G/DL (ref 12–16)
HYALINE CASTS #/AREA URNS LPF: ABNORMAL /LPF
IMM GRANULOCYTES # BLD AUTO: 0.03 K/UL (ref 0–0.11)
IMM GRANULOCYTES NFR BLD AUTO: 0.4 % (ref 0–0.9)
KETONES UR STRIP.AUTO-MCNC: NEGATIVE MG/DL
LEUKOCYTE ESTERASE UR QL STRIP.AUTO: ABNORMAL
LIPASE SERPL-CCNC: 51 U/L (ref 11–82)
LYMPHOCYTES # BLD AUTO: 1.93 K/UL (ref 1–4.8)
LYMPHOCYTES NFR BLD: 23.6 % (ref 22–41)
MCH RBC QN AUTO: 26.7 PG (ref 27–33)
MCHC RBC AUTO-ENTMCNC: 33.7 G/DL (ref 32.2–35.5)
MCV RBC AUTO: 79.3 FL (ref 81.4–97.8)
MICRO URNS: ABNORMAL
MONOCYTES # BLD AUTO: 0.63 K/UL (ref 0–0.85)
MONOCYTES NFR BLD AUTO: 7.7 % (ref 0–13.4)
NEUTROPHILS # BLD AUTO: 5.4 K/UL (ref 1.82–7.42)
NEUTROPHILS NFR BLD: 66 % (ref 44–72)
NITRITE UR QL STRIP.AUTO: NEGATIVE
NRBC # BLD AUTO: 0 K/UL
NRBC BLD-RTO: 0 /100 WBC (ref 0–0.2)
NUMBER OF RH DOSES IND 8505RD: NORMAL
PH UR STRIP.AUTO: 5 [PH] (ref 5–8)
PLATELET # BLD AUTO: 403 K/UL (ref 164–446)
PMV BLD AUTO: 10 FL (ref 9–12.9)
POTASSIUM SERPL-SCNC: 4.4 MMOL/L (ref 3.6–5.5)
PROT SERPL-MCNC: 8 G/DL (ref 6–8.2)
PROT UR QL STRIP: 30 MG/DL
RBC # BLD AUTO: 4.83 M/UL (ref 4.2–5.4)
RBC # URNS HPF: ABNORMAL /HPF
RBC UR QL AUTO: ABNORMAL
RH BLD: NORMAL
SODIUM SERPL-SCNC: 141 MMOL/L (ref 135–145)
SP GR UR STRIP.AUTO: 1.03
UROBILINOGEN UR STRIP.AUTO-MCNC: 0.2 MG/DL
WBC # BLD AUTO: 8.2 K/UL (ref 4.8–10.8)
WBC #/AREA URNS HPF: ABNORMAL /HPF

## 2023-08-31 PROCEDURE — 87086 URINE CULTURE/COLONY COUNT: CPT

## 2023-08-31 PROCEDURE — 81001 URINALYSIS AUTO W/SCOPE: CPT

## 2023-08-31 PROCEDURE — 84702 CHORIONIC GONADOTROPIN TEST: CPT

## 2023-08-31 PROCEDURE — 700117 HCHG RX CONTRAST REV CODE 255: Mod: UD | Performed by: EMERGENCY MEDICINE

## 2023-08-31 PROCEDURE — 74177 CT ABD & PELVIS W/CONTRAST: CPT

## 2023-08-31 PROCEDURE — 99284 EMERGENCY DEPT VISIT MOD MDM: CPT

## 2023-08-31 PROCEDURE — 80053 COMPREHEN METABOLIC PANEL: CPT

## 2023-08-31 PROCEDURE — 76830 TRANSVAGINAL US NON-OB: CPT

## 2023-08-31 PROCEDURE — 36415 COLL VENOUS BLD VENIPUNCTURE: CPT

## 2023-08-31 PROCEDURE — 86901 BLOOD TYPING SEROLOGIC RH(D): CPT

## 2023-08-31 PROCEDURE — 83690 ASSAY OF LIPASE: CPT

## 2023-08-31 PROCEDURE — 85025 COMPLETE CBC W/AUTO DIFF WBC: CPT

## 2023-08-31 PROCEDURE — 700111 HCHG RX REV CODE 636 W/ 250 OVERRIDE (IP): Mod: UD | Performed by: EMERGENCY MEDICINE

## 2023-08-31 PROCEDURE — 84703 CHORIONIC GONADOTROPIN ASSAY: CPT

## 2023-08-31 RX ORDER — ONDANSETRON 4 MG/1
4 TABLET, ORALLY DISINTEGRATING ORAL ONCE
Status: COMPLETED | OUTPATIENT
Start: 2023-08-31 | End: 2023-08-31

## 2023-08-31 RX ORDER — CIPROFLOXACIN 500 MG/1
500 TABLET, FILM COATED ORAL 2 TIMES DAILY
Qty: 28 TABLET | Refills: 0 | Status: ACTIVE | OUTPATIENT
Start: 2023-08-31 | End: 2023-09-15

## 2023-08-31 RX ORDER — METRONIDAZOLE 500 MG/1
500 TABLET ORAL 3 TIMES DAILY
Qty: 42 TABLET | Refills: 0 | Status: ACTIVE | OUTPATIENT
Start: 2023-08-31 | End: 2023-09-15

## 2023-08-31 RX ADMIN — IOHEXOL 100 ML: 350 INJECTION, SOLUTION INTRAVENOUS at 19:06

## 2023-08-31 RX ADMIN — ONDANSETRON 4 MG: 4 TABLET, ORALLY DISINTEGRATING ORAL at 14:04

## 2023-08-31 ASSESSMENT — FIBROSIS 4 INDEX: FIB4 SCORE: 0.72

## 2023-08-31 NOTE — ED TRIAGE NOTES
Chief Complaint   Patient presents with    Vaginal Bleeding     Since yesterday morning; pt states she passed clots and tissue as well    Abdominal Cramping     Since yesterday morning    Pregnancy     LMP was 6/20/23 and pt had positive home pregnancy tests 3 days ago and 2 days ago; pt not seen by OB/GYN    N/V     Vomited multiple times yesterday but only one episode of emesis today     Pt ambulatory to triage for above complaint. Pt states she has very irregular periods so she did not know she was pregnant until 3 days ago. Pt states she has had multiple miscarriages in the past and has been told that she cannot have children.     Pt is alert/oriented and follows commands. Pt speaking in full sentences and responds appropriately to questions. No acute distress noted in triage and respirations are even and unlabored.     Pt placed in lobby and educated on triage process. Pt encouraged to alert staff for any changes in condition.

## 2023-08-31 NOTE — ED PROVIDER NOTES
ER Provider Note    Scribed for Carlos Pinzon M.d. by Messi Uribe. 8/31/2023  1:39 PM    Primary Care Provider: Pcp Pt States None    CHIEF COMPLAINT  Chief Complaint   Patient presents with    Vaginal Bleeding     Since yesterday morning; pt states she passed clots and tissue as well    Abdominal Cramping     Since yesterday morning    Pregnancy     LMP was 6/20/23 and pt had positive home pregnancy tests 3 days ago and 2 days ago; pt not seen by OB/GYN    N/V     Vomited multiple times yesterday but only one episode of emesis today     EXTERNAL RECORDS REVIEWED  Other No recent records available for review. The patient last presented to the [facility] ED on 9/10/22 for evaluation after a syncopal episode with subsequent altered level of consciousness. She was discharged after a reassuring work-up and referred to Neurology.    HPI/ROS  LIMITATION TO HISTORY   Select: : None    OUTSIDE HISTORIAN(S):  None    Katie Graham is a 23 y.o. female who presents to the ED for evaluation of vaginal bleeding. Onset yesterday morning. The patient states yesterday morning at approximately 4:00 AM she woke up due to abdominal cramping and had an episode of vomiting at this time. Shortly after this she began experiencing heavy vaginal bleeding and notes she has passed several clots and tissue since then. The patient also notes she is possibly pregnant, her last menstrual period was 6/20/23. She has a history of irregular menstrual cycles. However, she had two positive home pregnancy tests within the past few days. She has not had the pregnancy confirmed by ultrasound or presented to OB/GYN yet. She has previously been pregnant, but notes she has had multiple miscarriages. She has been informed she would be unable to carry a child to term due to scar tissue in her uterus. She also reports associated symptoms of nausea, vomiting, and abdominal cramping. She had several episodes of vomiting yesterday, but only reports one  episode today. She denies any fevers, chills, diarrhea, or dysuria. She has a history of mast cell activation syndrome and POTS. She admits to smoking cigarettes, and notes she previously drank alcohol but has been sober for two months     PAST MEDICAL HISTORY  Past Medical History:   Diagnosis Date    Allergy     Apples cause migrains and fainting    Anxiety     Borderline personality disorder (HCC)     Depression     Family history of diabetes mellitus 11/04/2020    GERD (gastroesophageal reflux disease)     Head ache     Mast cell activation syndrome (HCC)     Migraine     POTS (postural orthostatic tachycardia syndrome)     Substance abuse ([facility])     xanax and alcohol in highschool       SURGICAL HISTORY  Past Surgical History:   Procedure Laterality Date    LAPAROSCOPIC UMBILICAL HERNIA REPAIR  2002       FAMILY HISTORY  Family History   Problem Relation Age of Onset    Diabetes Mother 30        Type 2    Alcohol abuse Mother         sober 18 months    Hypertension Father     Alcohol abuse Father     Kidney Disease Father     Diabetes Sister         type 2    Genetic Disorder Sister         club foot    No Known Problems Brother     Lung Disease Maternal Aunt     Alcohol abuse Maternal Aunt     Cancer Maternal Grandmother         Lung    Alcohol abuse Maternal Grandmother     Diabetes Maternal Grandfather         type 2    Heart Disease Maternal Grandfather     Hypertension Maternal Grandfather     Hyperlipidemia Maternal Grandfather     Alcohol abuse Maternal Grandfather     Heart Disease Paternal Grandmother     Hypertension Paternal Grandmother     Hyperlipidemia Paternal Grandmother     Alcohol abuse Paternal Grandfather     No Known Problems Sister     Cancer Maternal great-grandmother         breast       SOCIAL HISTORY   reports that she has been smoking cigarettes. She started smoking about 5 years ago. She has been smoking an average 0.1 packs per day for the past 0.2 years. She has never used smokeless  "tobacco. She reports that she does not currently use alcohol after a past usage of about 3.6 oz of alcohol per week. She reports that she does not currently use drugs.    CURRENT MEDICATIONS  No current outpatient medications     ALLERGIES  Apple, Pomegranate extract [punica], and Sulfa drugs    PHYSICAL EXAM  /89   Pulse 96   Temp 36 °C (96.8 °F) (Temporal)   Resp 16   Ht 1.626 m (5' 4\")   Wt (!) 130 kg (285 lb 15 oz)   LMP 06/20/2023 (Exact Date) Comment: Vaginal bleeding and cramping started 8/30  SpO2 95%   BMI 49.08 kg/m²   Constitutional: Well developed, Well nourished, No acute distress, Non-toxic appearance.   HENT: Normocephalic, Atraumatic, Bilateral external ears normal, Oropharynx moist, No oral exudates, Nose normal.   Eyes: PERRL, EOMI, Conjunctiva normal, No discharge.   Neck: Normal range of motion, No tenderness, Supple, No stridor.   Cardiovascular: Normal heart rate, Normal rhythm, No murmurs, No rubs, No gallops.   Thorax & Lungs: Normal breath sounds, No respiratory distress, No wheezing, No chest tenderness.   Abdomen: Bowel sounds normal, Soft, Bilateral lower abdominal tenderness. Increased right lower quadrant tenderness upon repeat examination.   Skin: Warm, Dry, No erythema, No rash.   Back: No tenderness, No CVA tenderness.   Musculoskeletal: Good range of motion in all major joints  Neurologic: Alert, No focal deficits noted.   Psychiatric: Affect normal       DIAGNOSTIC STUDIES    Labs:   Results for orders placed or performed during the hospital encounter of 08/31/23   CBC WITH DIFFERENTIAL   Result Value Ref Range    WBC 8.2 4.8 - 10.8 K/uL    RBC 4.83 4.20 - 5.40 M/uL    Hemoglobin 12.9 12.0 - 16.0 g/dL    Hematocrit 38.3 37.0 - 47.0 %    MCV 79.3 (L) 81.4 - 97.8 fL    MCH 26.7 (L) 27.0 - 33.0 pg    MCHC 33.7 32.2 - 35.5 g/dL    RDW 43.9 35.9 - 50.0 fL    Platelet Count 403 164 - 446 K/uL    MPV 10.0 9.0 - 12.9 fL    Neutrophils-Polys 66.00 44.00 - 72.00 %    " Lymphocytes 23.60 22.00 - 41.00 %    Monocytes 7.70 0.00 - 13.40 %    Eosinophils 1.60 0.00 - 6.90 %    Basophils 0.70 0.00 - 1.80 %    Immature Granulocytes 0.40 0.00 - 0.90 %    Nucleated RBC 0.00 0.00 - 0.20 /100 WBC    Neutrophils (Absolute) 5.40 1.82 - 7.42 K/uL    Lymphs (Absolute) 1.93 1.00 - 4.80 K/uL    Monos (Absolute) 0.63 0.00 - 0.85 K/uL    Eos (Absolute) 0.13 0.00 - 0.51 K/uL    Baso (Absolute) 0.06 0.00 - 0.12 K/uL    Immature Granulocytes (abs) 0.03 0.00 - 0.11 K/uL    NRBC (Absolute) 0.00 K/uL   COMP METABOLIC PANEL   Result Value Ref Range    Sodium 141 135 - 145 mmol/L    Potassium 4.4 3.6 - 5.5 mmol/L    Chloride 109 96 - 112 mmol/L    Co2 21 20 - 33 mmol/L    Anion Gap 11.0 7.0 - 16.0    Glucose 112 (H) 65 - 99 mg/dL    Bun 11 8 - 22 mg/dL    Creatinine 0.66 0.50 - 1.40 mg/dL    Calcium 9.6 8.5 - 10.5 mg/dL    Correct Calcium 9.5 8.5 - 10.5 mg/dL    AST(SGOT) 15 12 - 45 U/L    ALT(SGPT) 22 2 - 50 U/L    Alkaline Phosphatase 69 30 - 99 U/L    Total Bilirubin <0.2 0.1 - 1.5 mg/dL    Albumin 4.1 3.2 - 4.9 g/dL    Total Protein 8.0 6.0 - 8.2 g/dL    Globulin 3.9 (H) 1.9 - 3.5 g/dL    A-G Ratio 1.1 g/dL   HCG QUAL SERUM   Result Value Ref Range    Beta-Hcg Qualitative Serum Negative Negative   URINALYSIS CULTURE, IF INDICATED    Specimen: Urine, Clean Catch   Result Value Ref Range    Color Red (A)     Character Turbid (A)     Specific Gravity 1.027 <1.035    Ph 5.0 5.0 - 8.0    Glucose Negative Negative mg/dL    Ketones Negative Negative mg/dL    Protein 30 (A) Negative mg/dL    Bilirubin Negative Negative    Urobilinogen, Urine 0.2 Negative    Nitrite Negative Negative    Leukocyte Esterase Small (A) Negative    Occult Blood Large (A) Negative    Micro Urine Req Microscopic    HCG QUANTITATIVE SERUM   Result Value Ref Range    Bhcg <1.0 0.0 - 5.0 mIU/mL   RH TYPE FOR RHOGAM FROM E.D.   Result Value Ref Range    Emergency Department Rh Typing POS     Number Of Rh Doses Indicated ZERO    ESTIMATED  GFR   Result Value Ref Range    GFR (CKD-EPI) 128 >60 mL/min/1.73 m 2   LIPASE   Result Value Ref Range    Lipase 51 11 - 82 U/L   URINE MICROSCOPIC (W/UA)   Result Value Ref Range    WBC 10-20 (A) /hpf    -150 (A) /hpf    Bacteria Few (A) None /hpf    Epithelial Cells Few /hpf    Hyaline Cast 0-2 /lpf        Radiology:   The attending emergency physician has independently interpreted the diagnostic imaging associated with this visit and am waiting the final reading from the radiologist.   Preliminary interpretation is a follows: I reviewed the CT and IC inflation the terminal ileum.  Radiologist interpretation:     CT-ABDOMEN-PELVIS WITH   Final Result      1.  Abnormal terminal ileum with wall thickening and straightening over the distal 13 cm      2.  This finding is suspicious for inflammatory bowel disease or infectious ileitis if correlates with signs or symptoms      3.  Normal appendix      4.  Hepatic steatosis and hepatomegaly      US-PELVIC COMPLETE (TRANSABDOMINAL/TRANSVAGINAL) (COMBO)   Final Result      1.  Normal transvaginal appearance of the pelvis.           COURSE & MEDICAL DECISION MAKING     ED Observation Status? Yes; I am placing the patient in to an observation status due to a diagnostic uncertainty as well as therapeutic intensity. Patient placed in observation status at 1:55 PM, 8/31/2023.     Observation plan is as follows: Monitor for symptom management and diagnostic results.     Upon Reevaluation, the patient's condition has: Improved; and will be discharged.    Patient discharged from ED Observation status at 1955, 8/31/2023.    INITIAL ASSESSMENT, COURSE AND PLAN  Care Narrative: Records reviewed to establish patient's baseline labs and vitals.      1:51 PM- Patient seen and examined at bedside. Discussed plan of care, including an ultrasound and lab work. Patient agrees to the plan of care. The patient will be medicated with Zofran tablet 4 mg for nausea. Ordered for US-pelvic  complete, HCG qual and quantitative serums, CMP, CBC with differential, UA, Lipase, and RH type for rhogam from ED to evaluate her symptoms.      Differential diagnoses include but not limited to: Miscarriage, Ovain Cyst, Dysfunctional uterine bleeding, UTI, pyelo-, renal colic, GI causes like ileitis, colitis, appendicitis.    4:04 PM Patient was reevaluated at bedside. Discussed lab and radiology results with the patient which show the patient has a UTI. She informed me at this time that her pain is still present. Upon repeat examination she has increased right lower quadrant tenderness. Will plan to order CT-Abdomen pelvis with for further evaluation.  Patient is worsening tenderness in right lower quadrant.  I do not think she was ever pregnant.  Her quant is negative her ultrasound was unremarkable.  Urinalysis shows questionable signs of infection.  I do believe she is bleeding.  She denies any STD risk factors.    CT shows terminal ileitis.  Likely inflammatory or infectious colitis.  Spoke with the GI doctor on-call, Dr Andrews reviewed the CT and lab results.  He agrees patient can be discharged home she is well-appearing she does not have peritonitis leukocytosis fever or other red flags indicating she should be hospitalized.  He recommends 14 days of Flagyl and Cipro.  States to help with Crohn's or infectious colitis.  Recommends outpatient GI follow-up for outpatient colonoscopy and continued work-up and treatment.  She also denies any personal or family history of inflammatory bowel disease.    The patient is reexamined she is tolerating fluids.  Her vital signs remain reassuring she does not have peritonitis and she is comfortable with outpatient management.  Placed to GI and primary care referral.  She is counseled not to smoke.  GI recommendation is also to avoid NSAIDs and she can take Tylenol over-the-counter as directed.  She does not have peritonitis.    At this point she can go home with return  precautions and follow-up.  Discussed Cipro with her and the potential for tendinopathy in the patient that she ambulates with a cane is not very active at baseline.  Really low risk for her.    The patient is agreeable to plan.  She is referred to primary care and GI.  She understands return precautions.  Questions were answered she is agreeable to plan and she is discharged in good condition.    ADDITIONAL PROBLEM LIST  Mast cell activation syndrome, POTS.    DISPOSITION AND DISCUSSIONS  I have discussed management of the patient with the following physicians and JOSEPH's: GI as above.    Escalation of care considered, and ultimately not performed: acute inpatient care management, however at this time, the patient is most appropriate for outpatient management.    Barriers to care at this time, including but not limited to: Patient does not have established PCP.     Decision tools and prescription drugs considered including, but not limited to: Antibiotics prescribed per GI recommendation.    Referral to primary care and GI are placed.    [facility]  [address]  [place] [facility] [address]  Schedule an appointment as soon as possible for a visit in 3 days          FINAL DIAGNOSIS  1. Abdominal cramping    2. Terminal ileitis with other complication (HCC)    3. Vaginal bleeding    4. Acute UTI        Messi GRIMES (Benedict), am scribing for, and in the presence of, Carlos Pinzon M.D..    Electronically signed by: Messi Uribe (Benedict), 8/31/2023    Carlos GRIMES M.D. personally performed the services described in this documentation, as scribed by Messi Uribe in my presence, and it is both accurate and complete.      The note accurately reflects work and decisions made by me.  Carlos Pinzon M.D.  8/31/2023  8:11 PM

## 2023-09-01 ENCOUNTER — PHARMACY VISIT (OUTPATIENT)
Dept: PHARMACY | Facility: MEDICAL CENTER | Age: 23
End: 2023-09-01
Payer: COMMERCIAL

## 2023-09-01 PROCEDURE — RXMED WILLOW AMBULATORY MEDICATION CHARGE: Performed by: EMERGENCY MEDICINE

## 2023-09-01 NOTE — DISCHARGE INSTRUCTIONS
Your labs were reassuring but your CT showed looks like terminal ileitis which is inflammation of your small bowel.  GI recommends antibiotics and follow-up for an outpatient colonoscopy.  Return for worsening pain, fever, vomiting, unable to tolerate fluids, or any other concerns.  Follow-up with primary care and GI.  You must not smoke cigarettes.  Do not take nonsteroidal anti-inflammatories like ibuprofen, Aleve, Motrin.  Drink plenty of fluids.

## 2023-09-02 LAB
BACTERIA UR CULT: NORMAL
SIGNIFICANT IND 70042: NORMAL
SITE SITE: NORMAL
SOURCE SOURCE: NORMAL

## 2023-09-27 ENCOUNTER — APPOINTMENT (OUTPATIENT)
Dept: RADIOLOGY | Facility: MEDICAL CENTER | Age: 23
End: 2023-09-27
Attending: STUDENT IN AN ORGANIZED HEALTH CARE EDUCATION/TRAINING PROGRAM
Payer: MEDICAID

## 2023-09-27 ENCOUNTER — HOSPITAL ENCOUNTER (OUTPATIENT)
Facility: MEDICAL CENTER | Age: 23
End: 2023-09-28
Attending: STUDENT IN AN ORGANIZED HEALTH CARE EDUCATION/TRAINING PROGRAM | Admitting: STUDENT IN AN ORGANIZED HEALTH CARE EDUCATION/TRAINING PROGRAM
Payer: MEDICAID

## 2023-09-27 DIAGNOSIS — K52.9 IBD (INFLAMMATORY BOWEL DISEASE): ICD-10-CM

## 2023-09-27 DIAGNOSIS — R10.30 LOWER ABDOMINAL PAIN: ICD-10-CM

## 2023-09-27 DIAGNOSIS — K50.00 TERMINAL ILEITIS WITHOUT COMPLICATION (HCC): ICD-10-CM

## 2023-09-27 DIAGNOSIS — R82.81 PYURIA: ICD-10-CM

## 2023-09-27 PROBLEM — E66.813 CLASS 3 SEVERE OBESITY IN ADULT (HCC): Status: ACTIVE | Noted: 2020-11-04

## 2023-09-27 PROBLEM — E11.9 DIABETES (HCC): Status: ACTIVE | Noted: 2023-09-27

## 2023-09-27 LAB
ABO + RH BLD: NORMAL
ABO GROUP BLD: NORMAL
ALBUMIN SERPL BCP-MCNC: 4.2 G/DL (ref 3.2–4.9)
ALBUMIN/GLOB SERPL: 1.2 G/DL
ALP SERPL-CCNC: 70 U/L (ref 30–99)
ALT SERPL-CCNC: 21 U/L (ref 2–50)
ANION GAP SERPL CALC-SCNC: 12 MMOL/L (ref 7–16)
APPEARANCE UR: CLEAR
APTT PPP: 31.4 SEC (ref 24.7–36)
AST SERPL-CCNC: 17 U/L (ref 12–45)
BACTERIA #/AREA URNS HPF: ABNORMAL /HPF
BASOPHILS # BLD AUTO: 0.6 % (ref 0–1.8)
BASOPHILS # BLD: 0.05 K/UL (ref 0–0.12)
BILIRUB SERPL-MCNC: 0.2 MG/DL (ref 0.1–1.5)
BILIRUB UR QL STRIP.AUTO: NEGATIVE
BLD GP AB SCN SERPL QL: NORMAL
BUN SERPL-MCNC: 11 MG/DL (ref 8–22)
CALCIUM ALBUM COR SERPL-MCNC: 9.1 MG/DL (ref 8.5–10.5)
CALCIUM SERPL-MCNC: 9.3 MG/DL (ref 8.5–10.5)
CHLORIDE SERPL-SCNC: 105 MMOL/L (ref 96–112)
CO2 SERPL-SCNC: 20 MMOL/L (ref 20–33)
COLOR UR: YELLOW
CREAT SERPL-MCNC: 0.83 MG/DL (ref 0.5–1.4)
CRP SERPL HS-MCNC: 2.88 MG/DL (ref 0–0.75)
EOSINOPHIL # BLD AUTO: 0.16 K/UL (ref 0–0.51)
EOSINOPHIL NFR BLD: 1.9 % (ref 0–6.9)
EPI CELLS #/AREA URNS HPF: NEGATIVE /HPF
ERYTHROCYTE [DISTWIDTH] IN BLOOD BY AUTOMATED COUNT: 44.9 FL (ref 35.9–50)
GFR SERPLBLD CREATININE-BSD FMLA CKD-EPI: 103 ML/MIN/1.73 M 2
GLOBULIN SER CALC-MCNC: 3.5 G/DL (ref 1.9–3.5)
GLUCOSE BLD STRIP.AUTO-MCNC: 116 MG/DL (ref 65–99)
GLUCOSE SERPL-MCNC: 150 MG/DL (ref 65–99)
GLUCOSE UR STRIP.AUTO-MCNC: NEGATIVE MG/DL
HCG SERPL QL: NEGATIVE
HCT VFR BLD AUTO: 38 % (ref 37–47)
HGB BLD-MCNC: 12.8 G/DL (ref 12–16)
HYALINE CASTS #/AREA URNS LPF: ABNORMAL /LPF
IMM GRANULOCYTES # BLD AUTO: 0.02 K/UL (ref 0–0.11)
IMM GRANULOCYTES NFR BLD AUTO: 0.2 % (ref 0–0.9)
INR PPP: 1.07 (ref 0.87–1.13)
KETONES UR STRIP.AUTO-MCNC: NEGATIVE MG/DL
LACTATE SERPL-SCNC: 0.6 MMOL/L (ref 0.5–2)
LDH SERPL L TO P-CCNC: 180 U/L (ref 107–266)
LEUKOCYTE ESTERASE UR QL STRIP.AUTO: ABNORMAL
LIPASE SERPL-CCNC: 47 U/L (ref 11–82)
LYMPHOCYTES # BLD AUTO: 1.93 K/UL (ref 1–4.8)
LYMPHOCYTES NFR BLD: 23.2 % (ref 22–41)
MCH RBC QN AUTO: 27.4 PG (ref 27–33)
MCHC RBC AUTO-ENTMCNC: 33.7 G/DL (ref 32.2–35.5)
MCV RBC AUTO: 81.4 FL (ref 81.4–97.8)
MICRO URNS: ABNORMAL
MONOCYTES # BLD AUTO: 0.62 K/UL (ref 0–0.85)
MONOCYTES NFR BLD AUTO: 7.4 % (ref 0–13.4)
NEUTROPHILS # BLD AUTO: 5.55 K/UL (ref 1.82–7.42)
NEUTROPHILS NFR BLD: 66.7 % (ref 44–72)
NITRITE UR QL STRIP.AUTO: NEGATIVE
NRBC # BLD AUTO: 0 K/UL
NRBC BLD-RTO: 0 /100 WBC (ref 0–0.2)
PH UR STRIP.AUTO: 5.5 [PH] (ref 5–8)
PLATELET # BLD AUTO: 370 K/UL (ref 164–446)
PMV BLD AUTO: 10.8 FL (ref 9–12.9)
POTASSIUM SERPL-SCNC: 4 MMOL/L (ref 3.6–5.5)
PROT SERPL-MCNC: 7.7 G/DL (ref 6–8.2)
PROT UR QL STRIP: NEGATIVE MG/DL
PROTHROMBIN TIME: 14.1 SEC (ref 12–14.6)
RBC # BLD AUTO: 4.67 M/UL (ref 4.2–5.4)
RBC # URNS HPF: ABNORMAL /HPF
RBC UR QL AUTO: NEGATIVE
RH BLD: NORMAL
SODIUM SERPL-SCNC: 137 MMOL/L (ref 135–145)
SP GR UR STRIP.AUTO: 1.03
UROBILINOGEN UR STRIP.AUTO-MCNC: 0.2 MG/DL
WBC # BLD AUTO: 8.3 K/UL (ref 4.8–10.8)
WBC #/AREA URNS HPF: ABNORMAL /HPF

## 2023-09-27 PROCEDURE — 86140 C-REACTIVE PROTEIN: CPT

## 2023-09-27 PROCEDURE — 84703 CHORIONIC GONADOTROPIN ASSAY: CPT

## 2023-09-27 PROCEDURE — 71045 X-RAY EXAM CHEST 1 VIEW: CPT

## 2023-09-27 PROCEDURE — 86850 RBC ANTIBODY SCREEN: CPT

## 2023-09-27 PROCEDURE — 81001 URINALYSIS AUTO W/SCOPE: CPT

## 2023-09-27 PROCEDURE — 87086 URINE CULTURE/COLONY COUNT: CPT

## 2023-09-27 PROCEDURE — 96374 THER/PROPH/DIAG INJ IV PUSH: CPT

## 2023-09-27 PROCEDURE — 36415 COLL VENOUS BLD VENIPUNCTURE: CPT

## 2023-09-27 PROCEDURE — 700102 HCHG RX REV CODE 250 W/ 637 OVERRIDE(OP): Mod: UD | Performed by: STUDENT IN AN ORGANIZED HEALTH CARE EDUCATION/TRAINING PROGRAM

## 2023-09-27 PROCEDURE — A9270 NON-COVERED ITEM OR SERVICE: HCPCS | Mod: UD | Performed by: INTERNAL MEDICINE

## 2023-09-27 PROCEDURE — 700102 HCHG RX REV CODE 250 W/ 637 OVERRIDE(OP): Mod: UD | Performed by: INTERNAL MEDICINE

## 2023-09-27 PROCEDURE — 83605 ASSAY OF LACTIC ACID: CPT

## 2023-09-27 PROCEDURE — 86901 BLOOD TYPING SEROLOGIC RH(D): CPT

## 2023-09-27 PROCEDURE — 700111 HCHG RX REV CODE 636 W/ 250 OVERRIDE (IP): Mod: JZ,UD | Performed by: STUDENT IN AN ORGANIZED HEALTH CARE EDUCATION/TRAINING PROGRAM

## 2023-09-27 PROCEDURE — 700117 HCHG RX CONTRAST REV CODE 255: Mod: UD | Performed by: STUDENT IN AN ORGANIZED HEALTH CARE EDUCATION/TRAINING PROGRAM

## 2023-09-27 PROCEDURE — 85730 THROMBOPLASTIN TIME PARTIAL: CPT

## 2023-09-27 PROCEDURE — 96376 TX/PRO/DX INJ SAME DRUG ADON: CPT

## 2023-09-27 PROCEDURE — 83690 ASSAY OF LIPASE: CPT

## 2023-09-27 PROCEDURE — 96375 TX/PRO/DX INJ NEW DRUG ADDON: CPT

## 2023-09-27 PROCEDURE — 74177 CT ABD & PELVIS W/CONTRAST: CPT

## 2023-09-27 PROCEDURE — A9270 NON-COVERED ITEM OR SERVICE: HCPCS | Mod: UD | Performed by: STUDENT IN AN ORGANIZED HEALTH CARE EDUCATION/TRAINING PROGRAM

## 2023-09-27 PROCEDURE — 83993 ASSAY FOR CALPROTECTIN FECAL: CPT

## 2023-09-27 PROCEDURE — 82962 GLUCOSE BLOOD TEST: CPT

## 2023-09-27 PROCEDURE — 99285 EMERGENCY DEPT VISIT HI MDM: CPT

## 2023-09-27 PROCEDURE — 80053 COMPREHEN METABOLIC PANEL: CPT

## 2023-09-27 PROCEDURE — G0378 HOSPITAL OBSERVATION PER HR: HCPCS

## 2023-09-27 PROCEDURE — 83615 LACTATE (LD) (LDH) ENZYME: CPT

## 2023-09-27 PROCEDURE — 85610 PROTHROMBIN TIME: CPT

## 2023-09-27 PROCEDURE — 99222 1ST HOSP IP/OBS MODERATE 55: CPT | Performed by: STUDENT IN AN ORGANIZED HEALTH CARE EDUCATION/TRAINING PROGRAM

## 2023-09-27 PROCEDURE — 86900 BLOOD TYPING SEROLOGIC ABO: CPT

## 2023-09-27 PROCEDURE — 85025 COMPLETE CBC W/AUTO DIFF WBC: CPT

## 2023-09-27 PROCEDURE — 85652 RBC SED RATE AUTOMATED: CPT

## 2023-09-27 PROCEDURE — C9113 INJ PANTOPRAZOLE SODIUM, VIA: HCPCS | Mod: UD | Performed by: STUDENT IN AN ORGANIZED HEALTH CARE EDUCATION/TRAINING PROGRAM

## 2023-09-27 RX ORDER — PEG-3350, SODIUM SULFATE, SODIUM CHLORIDE, POTASSIUM CHLORIDE, SODIUM ASCORBATE AND ASCORBIC ACID 7.5-2.691G
100 KIT ORAL 2 TIMES DAILY
Status: COMPLETED | OUTPATIENT
Start: 2023-09-27 | End: 2023-09-28

## 2023-09-27 RX ORDER — MORPHINE SULFATE 4 MG/ML
4 INJECTION INTRAVENOUS
Status: DISCONTINUED | OUTPATIENT
Start: 2023-09-27 | End: 2023-09-28 | Stop reason: HOSPADM

## 2023-09-27 RX ORDER — MORPHINE SULFATE 4 MG/ML
4 INJECTION INTRAVENOUS ONCE
Status: COMPLETED | OUTPATIENT
Start: 2023-09-27 | End: 2023-09-27

## 2023-09-27 RX ORDER — PANTOPRAZOLE SODIUM 40 MG/10ML
40 INJECTION, POWDER, LYOPHILIZED, FOR SOLUTION INTRAVENOUS ONCE
Status: COMPLETED | OUTPATIENT
Start: 2023-09-27 | End: 2023-09-27

## 2023-09-27 RX ORDER — OMEPRAZOLE 20 MG/1
40 CAPSULE, DELAYED RELEASE ORAL DAILY
Status: DISCONTINUED | OUTPATIENT
Start: 2023-09-28 | End: 2023-09-28 | Stop reason: HOSPADM

## 2023-09-27 RX ORDER — ONDANSETRON 2 MG/ML
4 INJECTION INTRAMUSCULAR; INTRAVENOUS EVERY 4 HOURS PRN
Status: DISCONTINUED | OUTPATIENT
Start: 2023-09-27 | End: 2023-09-28 | Stop reason: HOSPADM

## 2023-09-27 RX ORDER — OXYCODONE HYDROCHLORIDE 5 MG/1
5 TABLET ORAL
Status: DISCONTINUED | OUTPATIENT
Start: 2023-09-27 | End: 2023-09-28 | Stop reason: HOSPADM

## 2023-09-27 RX ORDER — ONDANSETRON 2 MG/ML
4 INJECTION INTRAMUSCULAR; INTRAVENOUS ONCE
Status: COMPLETED | OUTPATIENT
Start: 2023-09-27 | End: 2023-09-27

## 2023-09-27 RX ORDER — ONDANSETRON 4 MG/1
4 TABLET, ORALLY DISINTEGRATING ORAL EVERY 4 HOURS PRN
Status: DISCONTINUED | OUTPATIENT
Start: 2023-09-27 | End: 2023-09-28 | Stop reason: HOSPADM

## 2023-09-27 RX ORDER — OXYCODONE HYDROCHLORIDE 10 MG/1
10 TABLET ORAL
Status: DISCONTINUED | OUTPATIENT
Start: 2023-09-27 | End: 2023-09-28 | Stop reason: HOSPADM

## 2023-09-27 RX ORDER — DEXTROSE MONOHYDRATE 25 G/50ML
25 INJECTION, SOLUTION INTRAVENOUS
Status: DISCONTINUED | OUTPATIENT
Start: 2023-09-27 | End: 2023-09-28 | Stop reason: HOSPADM

## 2023-09-27 RX ADMIN — PEG-3350, SODIUM SULFATE, SODIUM CHLORIDE, POTASSIUM CHLORIDE, SODIUM ASCORBATE AND ASCORBIC ACID 100 G: KIT at 22:06

## 2023-09-27 RX ADMIN — PANTOPRAZOLE SODIUM 40 MG: 40 INJECTION, POWDER, FOR SOLUTION INTRAVENOUS at 19:21

## 2023-09-27 RX ADMIN — ONDANSETRON 4 MG: 2 INJECTION INTRAMUSCULAR; INTRAVENOUS at 23:48

## 2023-09-27 RX ADMIN — FAMOTIDINE 20 MG: 10 INJECTION, SOLUTION INTRAVENOUS at 19:20

## 2023-09-27 RX ADMIN — IOHEXOL 100 ML: 350 INJECTION, SOLUTION INTRAVENOUS at 20:30

## 2023-09-27 RX ADMIN — MORPHINE SULFATE 4 MG: 4 INJECTION, SOLUTION INTRAMUSCULAR; INTRAVENOUS at 19:18

## 2023-09-27 RX ADMIN — OXYCODONE HYDROCHLORIDE 5 MG: 5 TABLET ORAL at 23:47

## 2023-09-27 RX ADMIN — ONDANSETRON 4 MG: 2 INJECTION INTRAMUSCULAR; INTRAVENOUS at 19:17

## 2023-09-27 ASSESSMENT — LIFESTYLE VARIABLES
EVER FELT BAD OR GUILTY ABOUT YOUR DRINKING: NO
SUBSTANCE_ABUSE: 0
HAVE PEOPLE ANNOYED YOU BY CRITICIZING YOUR DRINKING: NO
ON A TYPICAL DAY WHEN YOU DRINK ALCOHOL HOW MANY DRINKS DO YOU HAVE: 0
HAVE YOU EVER FELT YOU SHOULD CUT DOWN ON YOUR DRINKING: NO
DOES PATIENT WANT TO STOP DRINKING: NO
TOTAL SCORE: 0
AVERAGE NUMBER OF DAYS PER WEEK YOU HAVE A DRINK CONTAINING ALCOHOL: 0
TOTAL SCORE: 0
ALCOHOL_USE: NO
EVER HAD A DRINK FIRST THING IN THE MORNING TO STEADY YOUR NERVES TO GET RID OF A HANGOVER: NO
TOTAL SCORE: 0
HOW MANY TIMES IN THE PAST YEAR HAVE YOU HAD 5 OR MORE DRINKS IN A DAY: 0
CONSUMPTION TOTAL: NEGATIVE

## 2023-09-27 ASSESSMENT — ENCOUNTER SYMPTOMS
COUGH: 0
CHILLS: 0
HEADACHES: 0
DOUBLE VISION: 0
BLURRED VISION: 0
FEVER: 0
NAUSEA: 1
BLOOD IN STOOL: 0
ABDOMINAL PAIN: 1
DEPRESSION: 0
HEARTBURN: 1
VOMITING: 1
SHORTNESS OF BREATH: 0
DIZZINESS: 0
BRUISES/BLEEDS EASILY: 0

## 2023-09-27 ASSESSMENT — PAIN DESCRIPTION - PAIN TYPE
TYPE: ACUTE PAIN

## 2023-09-27 ASSESSMENT — PATIENT HEALTH QUESTIONNAIRE - PHQ9
8. MOVING OR SPEAKING SO SLOWLY THAT OTHER PEOPLE COULD HAVE NOTICED. OR THE OPPOSITE, BEING SO FIGETY OR RESTLESS THAT YOU HAVE BEEN MOVING AROUND A LOT MORE THAN USUAL: NOT AT ALL
3. TROUBLE FALLING OR STAYING ASLEEP OR SLEEPING TOO MUCH: NOT AT ALL
1. LITTLE INTEREST OR PLEASURE IN DOING THINGS: SEVERAL DAYS
7. TROUBLE CONCENTRATING ON THINGS, SUCH AS READING THE NEWSPAPER OR WATCHING TELEVISION: NOT AT ALL
SUM OF ALL RESPONSES TO PHQ9 QUESTIONS 1 AND 2: 2
2. FEELING DOWN, DEPRESSED, IRRITABLE, OR HOPELESS: SEVERAL DAYS
4. FEELING TIRED OR HAVING LITTLE ENERGY: SEVERAL DAYS
6. FEELING BAD ABOUT YOURSELF - OR THAT YOU ARE A FAILURE OR HAVE LET YOURSELF OR YOUR FAMILY DOWN: SEVERAL DAYS
9. THOUGHTS THAT YOU WOULD BE BETTER OFF DEAD, OR OF HURTING YOURSELF: NOT AT ALL

## 2023-09-27 ASSESSMENT — FIBROSIS 4 INDEX
FIB4 SCORE: 0.21
FIB4 SCORE: 0.17

## 2023-09-28 ENCOUNTER — ANESTHESIA (OUTPATIENT)
Dept: SURGERY | Facility: MEDICAL CENTER | Age: 23
End: 2023-09-28
Payer: MEDICAID

## 2023-09-28 ENCOUNTER — ANESTHESIA EVENT (OUTPATIENT)
Dept: SURGERY | Facility: MEDICAL CENTER | Age: 23
End: 2023-09-28
Payer: MEDICAID

## 2023-09-28 VITALS
HEIGHT: 64 IN | WEIGHT: 285.72 LBS | TEMPERATURE: 97.4 F | DIASTOLIC BLOOD PRESSURE: 75 MMHG | BODY MASS INDEX: 48.78 KG/M2 | SYSTOLIC BLOOD PRESSURE: 117 MMHG | RESPIRATION RATE: 40 BRPM | HEART RATE: 70 BPM | OXYGEN SATURATION: 93 %

## 2023-09-28 PROBLEM — R82.81 PYURIA: Status: RESOLVED | Noted: 2023-09-27 | Resolved: 2023-09-28

## 2023-09-28 LAB
ALBUMIN SERPL BCP-MCNC: 3.9 G/DL (ref 3.2–4.9)
ALBUMIN/GLOB SERPL: 1.1 G/DL
ALP SERPL-CCNC: 63 U/L (ref 30–99)
ALT SERPL-CCNC: 17 U/L (ref 2–50)
ANION GAP SERPL CALC-SCNC: 12 MMOL/L (ref 7–16)
AST SERPL-CCNC: 12 U/L (ref 12–45)
BASOPHILS # BLD AUTO: 0.8 % (ref 0–1.8)
BASOPHILS # BLD: 0.06 K/UL (ref 0–0.12)
BILIRUB SERPL-MCNC: 0.3 MG/DL (ref 0.1–1.5)
BUN SERPL-MCNC: 11 MG/DL (ref 8–22)
CALCIUM ALBUM COR SERPL-MCNC: 8.8 MG/DL (ref 8.5–10.5)
CALCIUM SERPL-MCNC: 8.7 MG/DL (ref 8.5–10.5)
CHLORIDE SERPL-SCNC: 105 MMOL/L (ref 96–112)
CO2 SERPL-SCNC: 20 MMOL/L (ref 20–33)
CREAT SERPL-MCNC: 0.88 MG/DL (ref 0.5–1.4)
EOSINOPHIL # BLD AUTO: 0.18 K/UL (ref 0–0.51)
EOSINOPHIL NFR BLD: 2.3 % (ref 0–6.9)
ERYTHROCYTE [DISTWIDTH] IN BLOOD BY AUTOMATED COUNT: 46.5 FL (ref 35.9–50)
ERYTHROCYTE [SEDIMENTATION RATE] IN BLOOD BY WESTERGREN METHOD: 29 MM/HOUR (ref 0–25)
GFR SERPLBLD CREATININE-BSD FMLA CKD-EPI: 96 ML/MIN/1.73 M 2
GLOBULIN SER CALC-MCNC: 3.5 G/DL (ref 1.9–3.5)
GLUCOSE BLD STRIP.AUTO-MCNC: 86 MG/DL (ref 65–99)
GLUCOSE BLD STRIP.AUTO-MCNC: 99 MG/DL (ref 65–99)
GLUCOSE SERPL-MCNC: 106 MG/DL (ref 65–99)
HCT VFR BLD AUTO: 37.8 % (ref 37–47)
HGB BLD-MCNC: 12.2 G/DL (ref 12–16)
IMM GRANULOCYTES # BLD AUTO: 0.02 K/UL (ref 0–0.11)
IMM GRANULOCYTES NFR BLD AUTO: 0.3 % (ref 0–0.9)
LYMPHOCYTES # BLD AUTO: 2.36 K/UL (ref 1–4.8)
LYMPHOCYTES NFR BLD: 29.9 % (ref 22–41)
MAGNESIUM SERPL-MCNC: 2.1 MG/DL (ref 1.5–2.5)
MCH RBC QN AUTO: 26.9 PG (ref 27–33)
MCHC RBC AUTO-ENTMCNC: 32.3 G/DL (ref 32.2–35.5)
MCV RBC AUTO: 83.4 FL (ref 81.4–97.8)
MONOCYTES # BLD AUTO: 0.72 K/UL (ref 0–0.85)
MONOCYTES NFR BLD AUTO: 9.1 % (ref 0–13.4)
NEUTROPHILS # BLD AUTO: 4.54 K/UL (ref 1.82–7.42)
NEUTROPHILS NFR BLD: 57.6 % (ref 44–72)
NRBC # BLD AUTO: 0 K/UL
NRBC BLD-RTO: 0 /100 WBC (ref 0–0.2)
PATHOLOGY CONSULT NOTE: NORMAL
PHOSPHATE SERPL-MCNC: 4.8 MG/DL (ref 2.5–4.5)
PLATELET # BLD AUTO: 319 K/UL (ref 164–446)
PMV BLD AUTO: 10.7 FL (ref 9–12.9)
POTASSIUM SERPL-SCNC: 4.3 MMOL/L (ref 3.6–5.5)
PROT SERPL-MCNC: 7.4 G/DL (ref 6–8.2)
RBC # BLD AUTO: 4.53 M/UL (ref 4.2–5.4)
SODIUM SERPL-SCNC: 137 MMOL/L (ref 135–145)
WBC # BLD AUTO: 7.9 K/UL (ref 4.8–10.8)

## 2023-09-28 PROCEDURE — 80053 COMPREHEN METABOLIC PANEL: CPT

## 2023-09-28 PROCEDURE — A9270 NON-COVERED ITEM OR SERVICE: HCPCS | Mod: UD | Performed by: STUDENT IN AN ORGANIZED HEALTH CARE EDUCATION/TRAINING PROGRAM

## 2023-09-28 PROCEDURE — 700102 HCHG RX REV CODE 250 W/ 637 OVERRIDE(OP): Mod: UD | Performed by: INTERNAL MEDICINE

## 2023-09-28 PROCEDURE — 45380 COLONOSCOPY AND BIOPSY: CPT | Performed by: INTERNAL MEDICINE

## 2023-09-28 PROCEDURE — 700105 HCHG RX REV CODE 258: Mod: UD | Performed by: INTERNAL MEDICINE

## 2023-09-28 PROCEDURE — 160002 HCHG RECOVERY MINUTES (STAT): Performed by: INTERNAL MEDICINE

## 2023-09-28 PROCEDURE — 700111 HCHG RX REV CODE 636 W/ 250 OVERRIDE (IP): Mod: UD | Performed by: ANESTHESIOLOGY

## 2023-09-28 PROCEDURE — 160035 HCHG PACU - 1ST 60 MINS PHASE I: Performed by: INTERNAL MEDICINE

## 2023-09-28 PROCEDURE — 700102 HCHG RX REV CODE 250 W/ 637 OVERRIDE(OP): Mod: UD | Performed by: STUDENT IN AN ORGANIZED HEALTH CARE EDUCATION/TRAINING PROGRAM

## 2023-09-28 PROCEDURE — 160208 HCHG ENDO MINUTES - EA ADDL 1 MIN LEVEL 4: Performed by: INTERNAL MEDICINE

## 2023-09-28 PROCEDURE — 88305 TISSUE EXAM BY PATHOLOGIST: CPT

## 2023-09-28 PROCEDURE — 160203 HCHG ENDO MINUTES - 1ST 30 MINS LEVEL 4: Performed by: INTERNAL MEDICINE

## 2023-09-28 PROCEDURE — 700101 HCHG RX REV CODE 250: Mod: UD | Performed by: ANESTHESIOLOGY

## 2023-09-28 PROCEDURE — 160048 HCHG OR STATISTICAL LEVEL 1-5: Performed by: INTERNAL MEDICINE

## 2023-09-28 PROCEDURE — 700111 HCHG RX REV CODE 636 W/ 250 OVERRIDE (IP): Mod: JZ,UD | Performed by: STUDENT IN AN ORGANIZED HEALTH CARE EDUCATION/TRAINING PROGRAM

## 2023-09-28 PROCEDURE — A9270 NON-COVERED ITEM OR SERVICE: HCPCS | Mod: UD | Performed by: INTERNAL MEDICINE

## 2023-09-28 PROCEDURE — 99239 HOSP IP/OBS DSCHRG MGMT >30: CPT | Mod: GC | Performed by: INTERNAL MEDICINE

## 2023-09-28 PROCEDURE — 99204 OFFICE O/P NEW MOD 45 MIN: CPT | Mod: 25 | Performed by: INTERNAL MEDICINE

## 2023-09-28 PROCEDURE — 83735 ASSAY OF MAGNESIUM: CPT

## 2023-09-28 PROCEDURE — 82962 GLUCOSE BLOOD TEST: CPT

## 2023-09-28 PROCEDURE — 84100 ASSAY OF PHOSPHORUS: CPT

## 2023-09-28 PROCEDURE — 96376 TX/PRO/DX INJ SAME DRUG ADON: CPT

## 2023-09-28 PROCEDURE — 160009 HCHG ANES TIME/MIN: Performed by: INTERNAL MEDICINE

## 2023-09-28 PROCEDURE — 160036 HCHG PACU - EA ADDL 30 MINS PHASE I: Performed by: INTERNAL MEDICINE

## 2023-09-28 PROCEDURE — G0378 HOSPITAL OBSERVATION PER HR: HCPCS

## 2023-09-28 PROCEDURE — 85025 COMPLETE CBC W/AUTO DIFF WBC: CPT

## 2023-09-28 RX ORDER — SODIUM CHLORIDE, SODIUM LACTATE, POTASSIUM CHLORIDE, CALCIUM CHLORIDE 600; 310; 30; 20 MG/100ML; MG/100ML; MG/100ML; MG/100ML
INJECTION, SOLUTION INTRAVENOUS CONTINUOUS
Status: ACTIVE | OUTPATIENT
Start: 2023-09-28 | End: 2023-09-28

## 2023-09-28 RX ORDER — LIDOCAINE HYDROCHLORIDE 20 MG/ML
INJECTION, SOLUTION EPIDURAL; INFILTRATION; INTRACAUDAL; PERINEURAL PRN
Status: DISCONTINUED | OUTPATIENT
Start: 2023-09-28 | End: 2023-09-28 | Stop reason: SURG

## 2023-09-28 RX ORDER — ONDANSETRON 2 MG/ML
4 INJECTION INTRAMUSCULAR; INTRAVENOUS
Status: DISCONTINUED | OUTPATIENT
Start: 2023-09-28 | End: 2023-09-28 | Stop reason: HOSPADM

## 2023-09-28 RX ORDER — DIPHENHYDRAMINE HYDROCHLORIDE 50 MG/ML
12.5 INJECTION INTRAMUSCULAR; INTRAVENOUS
Status: DISCONTINUED | OUTPATIENT
Start: 2023-09-28 | End: 2023-09-28 | Stop reason: HOSPADM

## 2023-09-28 RX ORDER — SODIUM CHLORIDE, SODIUM LACTATE, POTASSIUM CHLORIDE, CALCIUM CHLORIDE 600; 310; 30; 20 MG/100ML; MG/100ML; MG/100ML; MG/100ML
INJECTION, SOLUTION INTRAVENOUS CONTINUOUS
Status: DISCONTINUED | OUTPATIENT
Start: 2023-09-28 | End: 2023-09-28 | Stop reason: HOSPADM

## 2023-09-28 RX ORDER — HALOPERIDOL 5 MG/ML
1 INJECTION INTRAMUSCULAR
Status: DISCONTINUED | OUTPATIENT
Start: 2023-09-28 | End: 2023-09-28 | Stop reason: HOSPADM

## 2023-09-28 RX ADMIN — ONDANSETRON 4 MG: 2 INJECTION INTRAMUSCULAR; INTRAVENOUS at 05:32

## 2023-09-28 RX ADMIN — PROPOFOL 170 MG: 10 INJECTION, EMULSION INTRAVENOUS at 08:11

## 2023-09-28 RX ADMIN — OMEPRAZOLE 40 MG: 20 CAPSULE, DELAYED RELEASE ORAL at 05:32

## 2023-09-28 RX ADMIN — SODIUM CHLORIDE, POTASSIUM CHLORIDE, SODIUM LACTATE AND CALCIUM CHLORIDE: 600; 310; 30; 20 INJECTION, SOLUTION INTRAVENOUS at 08:07

## 2023-09-28 RX ADMIN — PEG-3350, SODIUM SULFATE, SODIUM CHLORIDE, POTASSIUM CHLORIDE, SODIUM ASCORBATE AND ASCORBIC ACID 100 G: KIT at 06:00

## 2023-09-28 RX ADMIN — LIDOCAINE HYDROCHLORIDE 100 MG: 20 INJECTION, SOLUTION EPIDURAL; INFILTRATION; INTRACAUDAL at 08:11

## 2023-09-28 ASSESSMENT — PAIN SCALES - GENERAL: PAIN_LEVEL: 2

## 2023-09-28 ASSESSMENT — PAIN DESCRIPTION - PAIN TYPE: TYPE: ACUTE PAIN

## 2023-09-28 NOTE — PROGRESS NOTES
Patient to be discharged today - patient aware and agreeable to plan. D/c instructions reviewed with patient - ?'s/concerns answered. No meds to beds, 1 piv removed.

## 2023-09-28 NOTE — PROGRESS NOTES
Patient arrived to unit via gurney and ambulated to bed with staggering gait. Pt resting in bed, NAD, A&O x4. Patient states mild pain in abdomen. POC discussed with patient, all questions addressed. Call light within reach.

## 2023-09-28 NOTE — OR NURSING
0907- Report from TIERRA Salgado.  Pt having a popsicle.      0915- Dr. Jones at bedside, updating pt.      0922- Report called to TIERRA Avery.  Pt placed on transport.     1007- pt transported out of PACU and to room.

## 2023-09-28 NOTE — ED TRIAGE NOTES
"Chief Complaint   Patient presents with    Abdominal Pain     Lower abdominal pain x4 hours. Associated with bloating.     Blood in Vomit     Pt had one episode of coffee ground emesis.      Pt BIB EMS with above complaints. Pt has history stomach ulcers and GERD. Pt states she has attempting to get in with GI, but has been unable to get an appointment scheduled. EMS administered 4 mg ODT zofran. Pt is breathing with even, unlabored respirations.     Protocol ordered. Pt educated on triage process, placed back in lobby, and instructed to inform staff of any changes.     /76   Pulse 99   Temp 37.3 °C (99.1 °F) (Temporal)   Resp 16   Ht 1.626 m (5' 4\")   Wt (!) 129 kg (284 lb 13.4 oz)   LMP 06/20/2023 (Exact Date) Comment: Vaginal bleeding and cramping started 8/30  SpO2 96%   Breastfeeding Unknown   BMI 48.89 kg/m²     "

## 2023-09-28 NOTE — ANESTHESIA PREPROCEDURE EVALUATION
Case: [id number] Date/Time: 09/28/23 0830    Procedure: COLONOSCOPY    Pre-op diagnosis: Terminal Ileitis    Location: [facility] ROOM 26 / SURGERY SAME DAY [facility]    Surgeons: Jamison Jones M.D.          Relevant Problems   GI   (positive) Gastroesophageal reflux disease without esophagitis       Physical Exam    Airway   Mallampati: II  TM distance: >3 FB  Neck ROM: full       Cardiovascular - normal exam  Rhythm: regular  Rate: normal  (-) murmur     Dental - normal exam           Pulmonary - normal exam  Breath sounds clear to auscultation     Abdominal   (+) obese     Neurological - normal exam                 Anesthesia Plan    ASA 3   ASA physical status 3 criteria: morbid obesity - BMI greater than or equal to 40    Plan - general       Airway plan will be LMA          Induction: intravenous    Postoperative Plan: Postoperative administration of opioids is intended.    Pertinent diagnostic labs and testing reviewed    Informed Consent:    Anesthetic plan and risks discussed with patient.    Use of blood products discussed with: patient whom consented to blood products.

## 2023-09-28 NOTE — H&P
Hospital Medicine History & Physical Note    Date of Service  9/27/2023    Primary Care Physician  Pcp Pt States None    Consultants  GI (Dr. Jones)    Code Status  Full Code    Chief Complaint  Chief Complaint   Patient presents with    Abdominal Pain     Lower abdominal pain x4 hours. Associated with bloating.     Blood in Vomit     Pt had one episode of coffee ground emesis.        History of Presenting Illness  Katie Graham is a 23 y.o. female who presented 9/27/2023 with complaint of abdominal pain.  Patient reported having sudden onset abdominal discomfort today, associated with nausea and vomiting.  She endorsed FH of ulcerative colitis.  In ER, her CTAP revealed terminal ileal wall thickening, suspicious of inflammatory or infectious etiology.  She reported having taken ciprofloxacin and Flagyl for 2 weeks prior to ER presentation.  GI was consulted, tentative plan for colonoscopy tomorrow.  Admitted to medicine service for further evaluation and treatment.    I discussed the plan of care with patient and bedside RN.    Review of Systems  Review of Systems   Constitutional:  Negative for chills and fever.   HENT:  Negative for hearing loss and tinnitus.    Eyes:  Negative for blurred vision and double vision.   Respiratory:  Negative for cough and shortness of breath.    Cardiovascular:  Negative for chest pain.   Gastrointestinal:  Positive for abdominal pain, heartburn, nausea and vomiting. Negative for blood in stool.   Genitourinary:  Negative for dysuria and hematuria.   Skin:  Negative for itching and rash.   Neurological:  Negative for dizziness and headaches.   Endo/Heme/Allergies:  Negative for environmental allergies. Does not bruise/bleed easily.   Psychiatric/Behavioral:  Negative for depression and substance abuse.        Past Medical History   has a past medical history of Allergy, Anxiety, Borderline personality disorder (HCC), Depression, Family history of diabetes mellitus (11/04/2020),  "GERD (gastroesophageal reflux disease), Head ache, Mast cell activation syndrome (HCC), Migraine, POTS (postural orthostatic tachycardia syndrome), and Substance abuse (HCC).    Surgical History   has a past surgical history that includes laparoscopic umbilical hernia repair (2002).     Family History  family history includes Alcohol abuse in her father, maternal aunt, maternal grandfather, maternal grandmother, mother, and paternal grandfather; Cancer in her maternal grandmother and maternal great-grandmother; Diabetes in her maternal grandfather and sister; Diabetes (age of onset: 30) in her mother; Genetic Disorder in her sister; Heart Disease in her maternal grandfather and paternal grandmother; Hyperlipidemia in her maternal grandfather and paternal grandmother; Hypertension in her father, maternal grandfather, and paternal grandmother; Kidney Disease in her father; Lung Disease in her maternal aunt; No Known Problems in her brother and sister.   Family history reviewed with patient. There is no family history that is pertinent to the chief complaint.     Social History   reports that she has been smoking cigarettes. She started smoking about 5 years ago. She has been smoking an average 0.1 packs per day for the past 0.2 years. She has never used smokeless tobacco. She reports that she does not currently use alcohol after a past usage of about 3.6 oz of alcohol per week. She reports that she does not currently use drugs.    Allergies  Allergies   Allergen Reactions    Apple Photosensitivity and Unspecified     Causes migraines and fainting. Large quantity causes seizures made worse by exposure to light    Pomegranate Extract [Punica] Hives    Sulfa Drugs Hives     Pt states \"I don't know what happens when I have it, my mom just always told me I was allergic to it and I remember being hospitalized for it when I was a kid\"       Medications  None       Physical Exam  Temp:  [36.4 °C (97.5 °F)-37.3 °C (99.1 °F)] " 36.4 °C (97.5 °F)  Pulse:  [88-99] 88  Resp:  [16-18] 18  BP: (123-137)/(70-76) 133/70  SpO2:  [96 %] 96 %  Blood Pressure: 123/70   Temperature: 37.3 °C (99.1 °F)   Pulse: 96   Respiration: 16   Pulse Oximetry: 96 %       Physical Exam  Vitals and nursing note reviewed.   Constitutional:       Appearance: She is obese.   HENT:      Head: Normocephalic and atraumatic.      Nose: Nose normal.      Mouth/Throat:      Mouth: Mucous membranes are moist.      Pharynx: Oropharynx is clear.   Eyes:      General: No scleral icterus.     Extraocular Movements: Extraocular movements intact.   Cardiovascular:      Rate and Rhythm: Normal rate and regular rhythm.      Pulses: Normal pulses.      Heart sounds:      No friction rub.   Pulmonary:      Effort: No respiratory distress.      Breath sounds: No wheezing or rales.   Chest:      Chest wall: No tenderness.   Abdominal:      General: There is distension.      Tenderness: There is no abdominal tenderness. There is no guarding or rebound.   Musculoskeletal:         General: No tenderness. Normal range of motion.      Cervical back: Neck supple. No tenderness.      Comments: Trace LE edema   Skin:     General: Skin is warm and dry.      Capillary Refill: Capillary refill takes less than 2 seconds.   Neurological:      General: No focal deficit present.      Mental Status: She is alert and oriented to person, place, and time.   Psychiatric:         Mood and Affect: Mood normal.         Laboratory:  Recent Labs     09/27/23 1859   WBC 8.3   RBC 4.67   HEMOGLOBIN 12.8   HEMATOCRIT 38.0   MCV 81.4   MCH 27.4   MCHC 33.7   RDW 44.9   PLATELETCT 370   MPV 10.8     Recent Labs     09/27/23 1859   SODIUM 137   POTASSIUM 4.0   CHLORIDE 105   CO2 20   GLUCOSE 150*   BUN 11   CREATININE 0.83   CALCIUM 9.3     Recent Labs     09/27/23 1859   ALTSGPT 21   ASTSGOT 17   ALKPHOSPHAT 70   TBILIRUBIN 0.2   LIPASE 47   GLUCOSE 150*     Recent Labs     09/27/23 1859   APTT 31.4   INR 1.07  "    No results for input(s): \"NTPROBNP\" in the last 72 hours.      No results for input(s): \"TROPONINT\" in the last 72 hours.    Imaging:  CT-ABDOMEN-PELVIS WITH   Final Result         1.  Mild terminal ileal wall thickening, consider inflammatory or infectious etiologies including terminal ileitis, similar to prior study.   2.  Enlarged right lower quadrant lymph nodes, appearance suggests changes of mesenteric adenitis, consider other causes of adenopathy with additional workup as clinically appropriate   3.  Hepatomegaly      DX-CHEST-PORTABLE (1 VIEW)   Final Result      No acute cardiopulmonary abnormality.          X-Ray:  I have personally reviewed the images and compared with prior images.  EKG:  I have personally reviewed the images and compared with prior images.    Assessment/Plan:  Justification for Admission Status  I anticipate this patient is appropriate for observation status at this time.        * IBD (inflammatory bowel disease)- (present on admission)  Assessment & Plan  Suspected  Abdominal pain, FH of IBD/UC  CTAP: Terminal ileal wall thickening, suspicious of terminal ileitis  IVF  Supportive pain control  Already completed antibiotic outpatient  Check inflammatory markers, fecal calprotectin  GI consulted, tentative plan for colonoscopy in a.m.    Pyuria  Assessment & Plan  Asymptomatic pyuria  Defer abx at this time  F/u UCx    Diabetes (HCC)  Assessment & Plan  ISS    Class 3 severe obesity in adult (HCC)  Assessment & Plan  Diet and lifestyle modification  Body mass index is 49.04 kg/m².          VTE prophylaxis: SCDs/TEDs  "

## 2023-09-28 NOTE — CARE PLAN
The patient is Stable - Low risk of patient condition declining or worsening    Shift Goals  Clinical Goals: Bowel prep for colonoscopy, NPO at midnight, pain control  Patient Goals: Rest  Family Goals: Not at bedside    POC discussed with patient to be on bowel prep for colonoscopy in the AM. Patient verbalized understanding of treatment plan and education.       Problem: Pain - Standard  Goal: Alleviation of pain or a reduction in pain to the patient’s comfort goal  Outcome: Progressing     Problem: Knowledge Deficit - Standard  Goal: Patient and family/care givers will demonstrate understanding of plan of care, disease process/condition, diagnostic tests and medications  Outcome: Progressing

## 2023-09-28 NOTE — ED NOTES
Pt medicated with meds per MAR and tolerated well. Pt ambulatory to bathroom to obtain urine sample with ED Tech.    Bedside report given to TIERRA Reyes. This RN removed from care.

## 2023-09-28 NOTE — CONSULTS
Date of Consultation:  9/28/2023    Patient: : Katie Graham  MRN: [id number]    Referring Physician:    Dr. Selin Brennan    GI:Jamison Jones M.D.     Reason for Consultation:   Terminal ileitis.    History of Present Illness:   23 Years old female with medical history of a high BMI, depression, anxiety, POTS, to inpatient GI service for recurrent/persistent abdominal pain, 2 CAT scan in the last 4-week shows ileitis with long segment inflammation.    Patient for intermittent abdominal pain and frequent bowel movement since she was a kid.  Never had a GI consultation, upper GI scope colonoscopy.  Patient denies major family history of GI disease.  The patient has minimal insight, likely 1 p.o. in the last 2 weeks.    The patient was seen by GI team at bedside, the patient had to have some cramping discomfort, the patient is working on the overnight preparation for colonoscopy.  Denies major upper GI symptoms right now denies hematochezia or bright red blood per rectum.      Past Medical History:   Diagnosis Date    Family history of diabetes mellitus 11/04/2020    Allergy     Apples cause migrains and fainting    Anxiety     Borderline personality disorder (HCC)     Depression     GERD (gastroesophageal reflux disease)     Head ache     Mast cell activation syndrome (HCC)     Migraine     POTS (postural orthostatic tachycardia syndrome)     Substance abuse (HCC)     xanax and alcohol in highschool     Constitutional: No fevers.  Eyes: No symptoms reported.   Ears/Nose/Throat/Mouth: No choking reported.  Cardiovascular: No chest pain reported.   Respiratory: Denies shortness of breath.  Gastrointestinal/Hepatic: Per H/P.   Skin/Breast: No new rash reported.   Psychiatric: No complaints    HEENT: grossly normal.  Skin: No erythema, No rash.  Lower limbs: normal, no pitting edema.   Neurologic: Alert & oriented x 3, Normal motor function, No focal deficits noted.  PSY: stable mood.       Past Surgical History:    Procedure Laterality Date    LAPAROSCOPIC UMBILICAL HERNIA REPAIR         Family History   Problem Relation Age of Onset    Diabetes Mother 30        Type 2    Alcohol abuse Mother         sober 18 months    Hypertension Father     Alcohol abuse Father     Kidney Disease Father     Diabetes Sister         type 2    Genetic Disorder Sister         club foot    No Known Problems Brother     Lung Disease Maternal Aunt     Alcohol abuse Maternal Aunt     Cancer Maternal Grandmother         Lung    Alcohol abuse Maternal Grandmother     Diabetes Maternal Grandfather         type 2    Heart Disease Maternal Grandfather     Hypertension Maternal Grandfather     Hyperlipidemia Maternal Grandfather     Alcohol abuse Maternal Grandfather     Heart Disease Paternal Grandmother     Hypertension Paternal Grandmother     Hyperlipidemia Paternal Grandmother     Alcohol abuse Paternal Grandfather     No Known Problems Sister     Cancer Maternal great-grandmother         breast       Social History     Socioeconomic History    Marital status: Single    Number of children: 0    Highest education level: 12th grade   Occupational History    Occupation: unemployed   Tobacco Use    Smoking status: Every Day     Current packs/day: 0.00     Average packs/day: 0.1 packs/day for 0.2 years     Types: Cigarettes     Start date: 2018     Last attempt to quit: 2018     Years since quittin.9    Smokeless tobacco: Never    Tobacco comments:     Quit after 3 months   Vaping Use    Vaping Use: Some days    Substances: Nicotine, Flavoring, 0 anali     Devices: Refillable tank   Substance and Sexual Activity    Alcohol use: Not Currently     Alcohol/week: 3.6 oz     Types: 6 Shots of liquor per week     Comment: none since 25    Drug use: Not Currently     Comment: past hx of using multiple drugs in high school    Sexual activity: Not Currently     Partners: Female, Male     Birth control/protection: Condom Male  "          Physical Exam:  Vitals:    09/27/23 1812 09/27/23 1814 09/27/23 2039 09/27/23 2127   BP:  137/76 123/70 133/70   Pulse:  99 96 88   Resp:  16 16 18   Temp:  37.3 °C (99.1 °F)  36.4 °C (97.5 °F)   TempSrc:  Temporal  Temporal   SpO2:  96% 96% 96%   Weight: (!) 129 kg (284 lb 13.4 oz)   (!) 130 kg (285 lb 11.5 oz)   Height: 1.626 m (5' 4\")   1.626 m (5' 4\")             Labs:  Recent Labs     09/27/23 1859   WBC 8.3   RBC 4.67   HEMOGLOBIN 12.8   HEMATOCRIT 38.0   MCV 81.4   MCH 27.4   MCHC 33.7   RDW 44.9   PLATELETCT 370   MPV 10.8     Recent Labs     09/27/23 1859   SODIUM 137   POTASSIUM 4.0   CHLORIDE 105   CO2 20   GLUCOSE 150*   BUN 11     Recent Labs     09/27/23 1859   APTT 31.4   INR 1.07       Recent Labs     09/27/23 1859   ASTSGOT 17   ALTSGPT 21   TBILIRUBIN 0.2   ALKPHOSPHAT 70   GLOBULIN 3.5   INR 1.07         Imaging:  CT-ABDOMEN-PELVIS WITH  Narrative:   9/27/2023 7:55 PM    HISTORY/REASON FOR EXAM:  Pain and bloating.    TECHNIQUE/EXAM DESCRIPTION:   CT scan of the abdomen and pelvis with contrast.    Contrast-enhanced helical scanning was obtained from the diaphragmatic domes through the pubic symphysis following the bolus administration of nonionic contrast without complication.    100 mL of Omnipaque 350 nonionic contrast was administered without complication.    Low dose optimization technique was utilized for this CT exam including automated exposure control and adjustment of the mA and/or kV according to patient size.    COMPARISON: August 31, 2023    FINDINGS:    Lower Chest: Unremarkable.    Liver: Hepatomegaly is seen.    Spleen: Unremarkable.    Pancreas: Unremarkable.    Gallbladder: No calcified stones.    Biliary: Nondilated.    Adrenal glands: Normal.    Kidneys: Unremarkable without hydronephrosis.    Bowel: There is mild terminal ileal wall thickening. The appendix appears within normal limits.    Lymph nodes: Prominent right lower quadrant lymph nodes are " seen.    Vasculature: Unremarkable.    Peritoneum: Unremarkable without ascites.    Musculoskeletal: No acute or destructive process.    Pelvis: No adenopathy or free fluid. Uterus and adnexal structures appear within physiologic limits.  Impression: 1.  Mild terminal ileal wall thickening, consider inflammatory or infectious etiologies including terminal ileitis, similar to prior study.  2.  Enlarged right lower quadrant lymph nodes, appearance suggests changes of mesenteric adenitis, consider other causes of adenopathy with additional workup as clinically appropriate  3.  Hepatomegaly  DX-CHEST-PORTABLE (1 VIEW)  Narrative: 9/27/2023 6:38 PM    HISTORY/REASON FOR EXAM:  Blood in vomit or stool or dark tarry stool.    TECHNIQUE/EXAM DESCRIPTION AND NUMBER OF VIEWS:  Single portable view of the chest.    COMPARISON: 9/10/2022    FINDINGS:    Cardiomediastinal silhouette is normal.    No focal consolidation, pleural effusion, pulmonary edema or pneumothorax.    No acute osseous abnormality.  Impression: No acute cardiopulmonary abnormality.            Impressions:  GI team review the 2 CAT scan performed at [facility] within the month, both show long segment ileitis.      The patient's symptoms, abdominal pain, is not getting better in the last couple weeks.     The GI team would suggest overnight stay in the hospital, colonoscopy preparation, and the colonoscopy September 28 in the morning.      If the patient could not complete the preparation in time, we can arrange colonoscopy on September 29 but better to finish preparation tonight for procedure tomorrow morning.     Please also send  Fecal calprotectin,   Blood CRP/ESR.      Diagnosis: ileitis.   Procedure: colonoscopy for diagnosis.          This note was generated using voice recognition software which has a small chance of producing errors of grammar and possibly content. I have made every reasonable attempt to find and correct any obvious errors, but expect  that some may not be found prior to finalization of this note.

## 2023-09-28 NOTE — PROGRESS NOTES
Patient: : Katie Graham  MRN: [id number]    GI team discussed with emergency provider on the phone regarding this patient's ileitis.    GI team review the 2 CAT scan performed at [facility] within the month, both show long segment ileitis.     The patient's symptoms, abdominal pain, is not getting better in the last couple weeks.    The GI team would suggest overnight stay in the hospital, colonoscopy preparation, and the colonoscopy September 28 in the morning.     If the patient could not complete the preparation in time, we can arrange colonoscopy on September 29 but better to finish preparation tonight for procedure tomorrow morning.    Please also send  Fecal calprotectin,   Blood CRP/ESR.     Diagnosis: ileitis.   Procedure: colonoscopy for diagnosis.       Labs:  Recent Labs     09/27/23 1859   WBC 8.3   RBC 4.67   HEMOGLOBIN 12.8   HEMATOCRIT 38.0   MCV 81.4   MCH 27.4   MCHC 33.7   RDW 44.9   PLATELETCT 370   MPV 10.8     Recent Labs     09/27/23 1859   SODIUM 137   POTASSIUM 4.0   CHLORIDE 105   CO2 20   GLUCOSE 150*   BUN 11     Recent Labs     09/27/23 1859   APTT 31.4   INR 1.07       Recent Labs     09/27/23 1859   ASTSGOT 17   ALTSGPT 21   TBILIRUBIN 0.2   ALKPHOSPHAT 70   GLOBULIN 3.5   INR 1.07

## 2023-09-28 NOTE — DISCHARGE INSTRUCTIONS
FOLLOW UP ITEMS POST DISCHARGE  Please call [phone] to schedule PCP appointment for patient.    Required specialty appointments include:       Discharge Instructions per Buzz Ngo, [email or web address].    -Follow-up with PCP s/p hospitalization  -Referral placed for outpatient gastroenterology for follow-up care  -Avoid NSAIDs  -Utilize acetaminophen for pain management at home  -Continue home medications if any    DIET: As tolerated    ACTIVITY: As tolerated    DIAGNOSIS: Abdominal pain    Return to ER if symptoms persist, chest pain, palpitations, shortness of breath, numbness, tingling, weakness, and high fevers.

## 2023-09-28 NOTE — PROGRESS NOTES
4 Eyes Skin Assessment Completed by TIERRA Mora and TIERRA Oliver.    Head WDL  Ears WDL  Nose WDL  Mouth WDL  Neck WDL  Breast/Chest WDL  Shoulder Blades WDL  Spine WDL  (R) Arm/Elbow/Hand WDL  (L) Arm/Elbow/Hand WDL  Abdomen WDL  Groin WDL  Scrotum/Coccyx/Buttocks WDL  (R) Leg WDL  (L) Leg WDL  (R) Heel/Foot/Toe WDL  (L) Heel/Foot/Toe WDL          Devices In Places Pulse Ox      Interventions In Place Pillows    Possible Skin Injury No    Pictures Uploaded Into Epic N/A  Wound Consult Placed N/A  RN Wound Prevention Protocol Ordered No

## 2023-09-28 NOTE — CARE PLAN
Problem: Pain - Standard  Goal: Alleviation of pain or a reduction in pain to the patient’s comfort goal  Outcome: Progressing     Problem: Knowledge Deficit - Standard  Goal: Patient and family/care givers will demonstrate understanding of plan of care, disease process/condition, diagnostic tests and medications  Outcome: Progressing   The patient is Stable - Low risk of patient condition declining or worsening    Shift Goals  Clinical Goals: Colonoscopy  Patient Goals: rest  Family Goals: PASCUAL    Progress made toward(s) clinical / shift goals:  patient updated on POC    Patient is not progressing towards the following goals:

## 2023-09-28 NOTE — ED NOTES
Pt ambulated from lobby to DINESH 19 without assistance, tolerated well. Pt is now sitting up in recliner chair with even and unlabored breaths, in no apparent distress at this time. Will continue to monitor pt while awaiting orders.

## 2023-09-28 NOTE — ANESTHESIA POSTPROCEDURE EVALUATION
Patient: Katie Graham    Procedure Summary     Date: 09/28/23 Room / Location: [facility] ROOM 26 / SURGERY SAME DAY [facility]    Anesthesia Start: 0807 Anesthesia Stop: 0841    Procedures:       COLONOSCOPY (Anus)      COLONOSCOPY, WITH BIOPSY (Anus) Diagnosis: (Terminal Ileitis, Mild Colitis)    Surgeons: Jamison Jones M.D. Responsible Provider: Demetrio Montgomery M.D.    Anesthesia Type: general ASA Status: 3          Final Anesthesia Type: general  Last vitals  BP   Blood Pressure: (!) 80/51    Temp   36.2 °C (97.2 °F)    Pulse   87   Resp   18    SpO2   99 %      Anesthesia Post Evaluation    Patient location during evaluation: PACU  Patient participation: complete - patient participated  Level of consciousness: awake and alert  Pain score: 2    Airway patency: patent  Anesthetic complications: no  Cardiovascular status: hemodynamically stable  Respiratory status: acceptable  Hydration status: euvolemic    PONV: none          There were no known notable events for this encounter.     Nurse Pain Score: 2 (NPRS)

## 2023-09-28 NOTE — PROCEDURES
OPERATIVE REPORT        PATIENT: Katie Graham  2000      PREOPERATIVE DIAGNOSIS/INDICATION: Terminal ileitis.     POSTOPERATIVE DIAGNOSES:   Long segment terminal ileitis with some narrowing.   Scar formation and necrosis of surface mucosa.   S/p extensive biopsy. Could be Crohn's.     Some scattered mild colitis mucosa pattern in both side of colons, s/p biopsy as right and left side colitis.     PROCEDURE: COLONOSCOPY    PHYSICIAN: Yamil Jones MD MPH    CONSENT:  OBTAINED. The risks, benefits and alternatives of the procedure were discussed in details. The risks include and are not limited to bleeding, infection, perforation, missed lesions, and sedations risks (cardiopulmonary compromise and allergic reaction to medications).    ANESTHESIA:  Per anesthesiologist.    LOCATION: [facility]    DESCRIPTION:  The patient presented to the procedure room.  After routine checkup was performed, patient was brought into endoscopy suite.  Patient was placed on his left lateral decubitus position.  Patient was sedated by anesthesia. Vital signs were monitored throughout procedure.  Oxygenation support was provided throughout procedure. Digital rectal examination was performed.  Then, a colonoscope was inserted into patient's anus, advanced to the 10cm into terminal ileum under direct visualization.  Once the site was reached and examined, the colonoscope was withdrawn and procedure was terminated. Withdrawal time was at least 6 minutes to ensure adequate examination.    The patient tolerated the procedure well.  There were no immediate complications.    The quality of the bowel preparation was borderline adequate.    [place]  Right:2  Mid:2  Left:2      Cecal intubation yes. Two times of cecal intubation done.    Retroflexion done.      FINDINGS:  Long segment terminal ileitis with some narrowing.   Scar formation and necrosis of surface mucosa.   S/p extensive biopsy.     Some scattered mild colitis mucosa pattern in  both side of colons, s/p biopsy as right and left side colitis.     Plan:  Okay to resume diet, not much restriction.   Should consider low residue diet and some stool softener due to the long segment inflammation in ileum.     Wait for pathology.   GI team will follow up with her.   She still need referral to see [facility] or [facility] for long term.   She can consider go home today in late afternoon.     GI signs off.     This note has been transcribed with digital voice recognition software and although it has been reviewed may contain grammatical or word errors

## 2023-09-28 NOTE — ANESTHESIA PROCEDURE NOTES
Airway    Date/Time: 9/28/2023 8:11 AM    Performed by: Demetrio Montgomery M.D.  Authorized by: Demetrio Montgomery M.D.    Location:  OR  Urgency:  Elective  Indications for Airway Management:  Anesthesia      Spontaneous Ventilation: absent    Sedation Level:  Deep  Preoxygenated: Yes    Final Airway Type:  Supraglottic airway  Final Supraglottic Airway:  Standard LMA    SGA Size:  4  Number of Attempts at Approach:  1

## 2023-09-28 NOTE — ANESTHESIA TIME REPORT
Anesthesia Start and Stop Event Times     Date Time Event    9/28/2023 0807 Anesthesia Start     0841 Anesthesia Stop        Responsible Staff  09/28/23    Name Role Begin End    Demetrio Montgomery M.D. Anesth 0807 0841        Overtime Reason:  no overtime (within assigned shift)    Comments:

## 2023-09-28 NOTE — OR NURSING
0838 patient arrival from OR; report received from MD and RN. Patient attached to monitor and VSS with patient on 6L O2 via mask with oral airway in place. .     0840 Dr. Jones at bedside, patient still sleeping with airway in place. MD will provide updates to patient on the floor.     0902 patient arousable but drowsy. Declining water at this time.     0904 first attempt to TIERRA Cortez for patient report.     0907 hand off to TIERRA Love.

## 2023-09-28 NOTE — DISCHARGE SUMMARY
"Discharge Summary    CHIEF COMPLAINT ON ADMISSION  Chief Complaint   Patient presents with    Abdominal Pain     Lower abdominal pain x4 hours. Associated with bloating.     Blood in Vomit     Pt had one episode of coffee ground emesis.        Reason for Admission  EMS     Admission Date  9/27/2023    CODE STATUS  Full Code    HPI & HOSPITAL COURSE    Ms. Katie Graham is a 23 y.o. female with a PMHx of obesity, depression, anxiety, pots syndrome, diabetes, possible homelessness, who presented 9/27/2023 with complaint of abdominal pain.      Patient reported having sudden onset abdominal discomfort today, associated with nausea and vomiting.  She endorsed FH of ulcerative colitis.  Patient was referred to GI, however per patient, she was unable to make an appointment as \"they will not call her back \".    In ER, her CTAP revealed terminal ileal wall thickening, suspicious of inflammatory or infectious etiology.  She reported having taken ciprofloxacin and Flagyl for 2 weeks prior to ER presentation.  GI was consulted, tentative plan for colonoscopy tomorrow.  Admitted to medicine service for further evaluation and treatment.    During this hospitalization, gastroenterology was consulted.  Patient underwent a colonoscopy which noted long segment terminal ileitis with some narrowing, scar formation and necrosis of surface mucosa and biopsy was obtained with possible Crohn's diagnosis noted.    Patient able to tolerate food intake postprocedure.  Discussed case with GI of which patient is referred to outpatient GI for follow-up and await biopsy results.  No antibiotics at this time.  Discussed patient utilizing acetaminophen for pain management and avoid any NSAIDs.  Patient to resume all home medications.  All questions and concerns answered prior to being discharged.  Patient discharged home.    Therefore, she is discharged in good and stable condition to home with close outpatient follow-up.    The patient " "recovered much more quickly than anticipated on admission.    Discharge Date  09/28/23      FOLLOW UP ITEMS POST DISCHARGE  Please call [phone] to schedule PCP appointment for patient.    Required specialty appointments include:       Discharge Instructions per JOZEF Thomas    -Follow-up with PCP s/p hospitalization  -Referral placed for outpatient gastroenterology for follow-up care  -Avoid NSAIDs  -Utilize acetaminophen for pain management at home  -Continue home medications if any    DIET: As tolerated    ACTIVITY: As tolerated    DIAGNOSIS: Abdominal pain    Return to ER if symptoms persist, chest pain, palpitations, shortness of breath, numbness, tingling, weakness, and high fevers.      DISCHARGE DIAGNOSES  Principal Problem:    IBD (inflammatory bowel disease) (POA: Yes)  Active Problems:    Class 3 severe obesity in adult (HCC) (POA: Unknown)    Diabetes (HCC) (POA: Unknown)  Resolved Problems:    Pyuria (POA: Unknown)      FOLLOW UP  No future appointments.  No follow-up provider specified.    MEDICATIONS ON DISCHARGE     Medication List      You have not been prescribed any medications.         Allergies  Allergies   Allergen Reactions    Apple Photosensitivity and Unspecified     Causes migraines and fainting. Large quantity causes seizures made worse by exposure to light    Pomegranate Extract [Punica] Hives    Latex     Sulfa Drugs Hives     Pt states \"I don't know what happens when I have it, my mom just always told me I was allergic to it and I remember being hospitalized for it when I was a kid\"       DIET  Orders Placed This Encounter   Procedures    Diet Order Diet: Regular     Standing Status:   Standing     Number of Occurrences:   1     Order Specific Question:   Diet:     Answer:   Regular [1]       ACTIVITY  As tolerated.  Weight bearing as tolerated    CONSULTATIONS  Gastroenterology    PROCEDURES  Colonoscopy with biopsies    IMAGING  CT-ABDOMEN-PELVIS WITH "   Final Result         1.  Mild terminal ileal wall thickening, consider inflammatory or infectious etiologies including terminal ileitis, similar to prior study.   2.  Enlarged right lower quadrant lymph nodes, appearance suggests changes of mesenteric adenitis, consider other causes of adenopathy with additional workup as clinically appropriate   3.  Hepatomegaly      DX-CHEST-PORTABLE (1 VIEW)   Final Result      No acute cardiopulmonary abnormality.            LABORATORY  Lab Results   Component Value Date    SODIUM 137 09/28/2023    POTASSIUM 4.3 09/28/2023    CHLORIDE 105 09/28/2023    CO2 20 09/28/2023    GLUCOSE 106 (H) 09/28/2023    BUN 11 09/28/2023    CREATININE 0.88 09/28/2023    GLOMRATE 117 03/19/2022        Lab Results   Component Value Date    WBC 7.9 09/28/2023    HEMOGLOBIN 12.2 09/28/2023    HEMATOCRIT 37.8 09/28/2023    PLATELETCT 319 09/28/2023

## 2023-09-28 NOTE — HOSPITAL COURSE
"Ms. Katie Graham is a 23 y.o. female with a PMHx of obesity, depression, anxiety, pots syndrome, diabetes, possible homelessness, who presented 9/27/2023 with complaint of abdominal pain.      Patient reported having sudden onset abdominal discomfort today, associated with nausea and vomiting.  She endorsed FH of ulcerative colitis.  Patient was referred to GI, however per patient, she was unable to make an appointment as \"they will not call her back \".    In ER, her CTAP revealed terminal ileal wall thickening, suspicious of inflammatory or infectious etiology.  She reported having taken ciprofloxacin and Flagyl for 2 weeks prior to ER presentation.  GI was consulted, tentative plan for colonoscopy tomorrow.  Admitted to medicine service for further evaluation and treatment.    During this hospitalization, gastroenterology was consulted.  Patient underwent a colonoscopy which noted long segment terminal ileitis with some narrowing, scar formation and necrosis of surface mucosa and biopsy was obtained with possible Crohn's diagnosis noted.    Patient able to tolerate food intake postprocedure.  Discussed case with GI of which patient is referred to outpatient GI for follow-up and await biopsy results.  No antibiotics at this time.  Discussed patient utilizing acetaminophen for pain management and avoid any NSAIDs.  Patient to resume all home medications.  All questions and concerns answered prior to being discharged.  Patient discharged home.  "

## 2023-09-28 NOTE — ASSESSMENT & PLAN NOTE
Suspected  Abdominal pain, FH of IBD/UC  CTAP: Terminal ileal wall thickening, suspicious of terminal ileitis  IVF  Supportive pain control  Already completed antibiotic outpatient  Check inflammatory markers, fecal calprotectin  GI consulted, tentative plan for colonoscopy in a.m.

## 2023-09-28 NOTE — ED NOTES
Med rec updated and complete. Allergies reviewed.  Confirmed name and date of birth. Pt stated that a few weeks ago she finished a course of ciprofloxacin and metronidazole for her stomach problems.  Pt is unable to recall  when she finished it .      Pt is unsure of what pharmacy she filled it at.    Pt would like to use   [facility]   [phone]

## 2023-09-28 NOTE — ED PROVIDER NOTES
ED Provider Note    CHIEF COMPLAINT  Chief Complaint   Patient presents with    Abdominal Pain     Lower abdominal pain x4 hours. Associated with bloating.     Blood in Vomit     Pt had one episode of coffee ground emesis.        EXTERNAL RECORDS REVIEWED  Patient seen in the ER on 31 August for abdominal pain and cramping.  She had a CT scan that showed terminal ileitis.  On-call, GI Dr. Andrews reviewed the CT and lab results and recommended treatment with antibiotics and outpatient follow-up.    HPI/ROS  LIMITATION TO HISTORY   Select: : None  OUTSIDE HISTORIAN(S):  None    Katie Graham is a 23 y.o. female who presents abdominal pain and bloating.  She says it has been ongoing for weeks however worsened the past 4 hours.  She has had persistent abdominal pain since she was here in the emergency department in August.  She describes that it is mostly diffuse throughout her lower abdomen.  She says that she feels bloated and 'gassy'.  She has had an episode of loose stools today.  Yesterday she had an episode of vomiting with both bright red blood and coffee-ground emesis.  She denies any bright red blood in her stool but has had dark stool.  She completed the course of antibiotics (cipro and flagyl). Patient attempted to establish with GI however the they told her they were no longer taking any new patients.  She was only able to a schedule follow-up with her PCP in December.    She has a family history of ulcerative colitis.     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    SURGICAL HISTORY   has a past surgical history that includes laparoscopic umbilical hernia repair (2002).    FAMILY HISTORY  Family History    Problem Relation Age of Onset    Diabetes Mother 30        Type 2    Alcohol abuse Mother         sober 18 months    Hypertension Father     Alcohol abuse Father     Kidney Disease Father     Diabetes Sister         type 2    Genetic Disorder Sister         club foot    No Known Problems Brother     Lung Disease Maternal Aunt     Alcohol abuse Maternal Aunt     Cancer Maternal Grandmother         Lung    Alcohol abuse Maternal Grandmother     Diabetes Maternal Grandfather         type 2    Heart Disease Maternal Grandfather     Hypertension Maternal Grandfather     Hyperlipidemia Maternal Grandfather     Alcohol abuse Maternal Grandfather     Heart Disease Paternal Grandmother     Hypertension Paternal Grandmother     Hyperlipidemia Paternal Grandmother     Alcohol abuse Paternal Grandfather     No Known Problems Sister     Cancer Maternal great-grandmother         breast       SOCIAL HISTORY  Social History     Tobacco Use    Smoking status: Every Day     Current packs/day: 0.00     Average packs/day: 0.1 packs/day for 0.2 years     Types: Cigarettes     Start date: 2018     Last attempt to quit: 2018     Years since quittin.9    Smokeless tobacco: Never    Tobacco comments:     Quit after 3 months   Vaping Use    Vaping Use: Some days    Substances: Nicotine, Flavoring, 0 anali     Devices: Refillable tank   Substance and Sexual Activity    Alcohol use: Not Currently     Alcohol/week: 3.6 oz     Types: 6 Shots of liquor per week     Comment: none since 25    Drug use: Not Currently     Comment: past hx of using multiple drugs in high school    Sexual activity: Not Currently     Partners: Female, Male     Birth control/protection: Condom Male       CURRENT MEDICATIONS  Home Medications       Reviewed by Juanita Alicia (Pharmacy Tech) on 23 at 214  Med List Status: Complete     Medication Last Dose Status        Patient [place] Taking any Medications                      "      ALLERGIES  Allergies   Allergen Reactions    Apple Photosensitivity and Unspecified     Causes migraines and fainting. Large quantity causes seizures made worse by exposure to light    Pomegranate Extract [Punica] Hives    Sulfa Drugs Hives     Pt states \"I don't know what happens when I have it, my mom just always told me I was allergic to it and I remember being hospitalized for it when I was a kid\"       PHYSICAL EXAM  VITAL SIGNS: /70   Pulse 88   Temp 36.4 °C (97.5 °F) (Temporal)   Resp 18   Ht 1.626 m (5' 4\")   Wt (!) 130 kg (285 lb 11.5 oz)   LMP 06/20/2023 (Exact Date) Comment: Vaginal bleeding and cramping started 8/30  SpO2 96%   Breastfeeding Unknown   BMI 49.04 kg/m²    Constitutional: Awake and alert. Non toxic  HENT: Normal inspection. Moist mucous membranes  Eyes: Normal inspection  Neck: Grossly normal range of motion.  Cardiovascular: Normal heart rate, Normal rhythm.  Symmetric peripheral pulses.   Thorax & Lungs: No respiratory distress, No wheezing, No rales, No rhonchi, No chest tenderness.   Abdomen: Soft, mildly distended, mild lower abdominal tenderness, no rebound or guarding, no mass  Skin: No obvious rash.  Extremities: Warm, well perfused. No clubbing, cyanosis, edema   Neurologic: Grossly normal   Psychiatric: Normal for situation      DIAGNOSTIC STUDIES / PROCEDURES      LABS  Results for orders placed or performed during the hospital encounter of 09/27/23   COD (ADULT)   Result Value Ref Range    ABO Grouping Only O     Rh Grouping Only POS     Antibody Screen-Cod NEG    CBC WITH DIFFERENTIAL   Result Value Ref Range    WBC 8.3 4.8 - 10.8 K/uL    RBC 4.67 4.20 - 5.40 M/uL    Hemoglobin 12.8 12.0 - 16.0 g/dL    Hematocrit 38.0 37.0 - 47.0 %    MCV 81.4 81.4 - 97.8 fL    MCH 27.4 27.0 - 33.0 pg    MCHC 33.7 32.2 - 35.5 g/dL    RDW 44.9 35.9 - 50.0 fL    Platelet Count 370 164 - 446 K/uL    MPV 10.8 9.0 - 12.9 fL    Neutrophils-Polys 66.70 44.00 - 72.00 %    " Lymphocytes 23.20 22.00 - 41.00 %    Monocytes 7.40 0.00 - 13.40 %    Eosinophils 1.90 0.00 - 6.90 %    Basophils 0.60 0.00 - 1.80 %    Immature Granulocytes 0.20 0.00 - 0.90 %    Nucleated RBC 0.00 0.00 - 0.20 /100 WBC    Neutrophils (Absolute) 5.55 1.82 - 7.42 K/uL    Lymphs (Absolute) 1.93 1.00 - 4.80 K/uL    Monos (Absolute) 0.62 0.00 - 0.85 K/uL    Eos (Absolute) 0.16 0.00 - 0.51 K/uL    Baso (Absolute) 0.05 0.00 - 0.12 K/uL    Immature Granulocytes (abs) 0.02 0.00 - 0.11 K/uL    NRBC (Absolute) 0.00 K/uL   COMP METABOLIC PANEL   Result Value Ref Range    Sodium 137 135 - 145 mmol/L    Potassium 4.0 3.6 - 5.5 mmol/L    Chloride 105 96 - 112 mmol/L    Co2 20 20 - 33 mmol/L    Anion Gap 12.0 7.0 - 16.0    Glucose 150 (H) 65 - 99 mg/dL    Bun 11 8 - 22 mg/dL    Creatinine 0.83 0.50 - 1.40 mg/dL    Calcium 9.3 8.5 - 10.5 mg/dL    Correct Calcium 9.1 8.5 - 10.5 mg/dL    AST(SGOT) 17 12 - 45 U/L    ALT(SGPT) 21 2 - 50 U/L    Alkaline Phosphatase 70 30 - 99 U/L    Total Bilirubin 0.2 0.1 - 1.5 mg/dL    Albumin 4.2 3.2 - 4.9 g/dL    Total Protein 7.7 6.0 - 8.2 g/dL    Globulin 3.5 1.9 - 3.5 g/dL    A-G Ratio 1.2 g/dL   LIPASE   Result Value Ref Range    Lipase 47 11 - 82 U/L   PROTHROMBIN TIME   Result Value Ref Range    PT 14.1 12.0 - 14.6 sec    INR 1.07 0.87 - 1.13   APTT   Result Value Ref Range    APTT 31.4 24.7 - 36.0 sec   HCG QUAL SERUM   Result Value Ref Range    Beta-Hcg Qualitative Serum Negative Negative   URINALYSIS,CULTURE IF INDICATED    Specimen: Urine   Result Value Ref Range    Color Yellow     Character Clear     Specific Gravity 1.026 <1.035    Ph 5.5 5.0 - 8.0    Glucose Negative Negative mg/dL    Ketones Negative Negative mg/dL    Protein Negative Negative mg/dL    Bilirubin Negative Negative    Urobilinogen, Urine 0.2 Negative    Nitrite Negative Negative    Leukocyte Esterase Moderate (A) Negative    Occult Blood Negative Negative    Micro Urine Req Microscopic    ABO Rh Confirm   Result Value  Ref Range    ABO Rh Confirm O POS    ESTIMATED GFR   Result Value Ref Range    GFR (CKD-EPI) 103 >60 mL/min/1.73 m 2   URINE MICROSCOPIC (W/UA)   Result Value Ref Range    WBC 20-50 (A) /hpf    RBC 5-10 (A) /hpf    Bacteria Moderate (A) None /hpf    Epithelial Cells Negative /hpf    Hyaline Cast 3-5 (A) /lpf   LACTIC ACID   Result Value Ref Range    Lactic Acid 0.6 0.5 - 2.0 mmol/L   CRP QUANTITIVE (NON-CARDIAC)   Result Value Ref Range    Stat C-Reactive Protein 2.88 (H) 0.00 - 0.75 mg/dL   LDH   Result Value Ref Range    LDH Total 180 107 - 266 U/L   POCT glucose device results   Result Value Ref Range    POC Glucose, Blood 116 (H) 65 - 99 mg/dL         RADIOLOGY  I have independently interpreted the diagnostic imaging associated with this visit and am waiting the final reading from the radiologist.   My preliminary interpretation is as follows: No focal infiltrate on XR  Radiologist interpretation:   CT-ABDOMEN-PELVIS WITH   Final Result         1.  Mild terminal ileal wall thickening, consider inflammatory or infectious etiologies including terminal ileitis, similar to prior study.   2.  Enlarged right lower quadrant lymph nodes, appearance suggests changes of mesenteric adenitis, consider other causes of adenopathy with additional workup as clinically appropriate   3.  Hepatomegaly      DX-CHEST-PORTABLE (1 VIEW)   Final Result      No acute cardiopulmonary abnormality.            COURSE & MEDICAL DECISION MAKING    ED Observation Status? Yes; I am placing the patient in to an observation status due to a diagnostic uncertainty as well as therapeutic intensity. Patient placed in observation status at 6:55 PM, 9/27/2023.     Observation plan is as follows: IV, Labs, CT, Serial Exams     Upon Reevaluation, the patient's condition has: not improved; and will be escalated to hospitalization.    Patient discharged from ED Observation status at 20.49 PM 9/28/2023    INITIAL ASSESSMENT, COURSE AND PLAN  Care Narrative:  This is a 23-year-old female who presents with acute worsening of abdominal pain and bloating.  She arrives afebrile with normal vital signs and is overall systemically well-appearing.  She has diffuse lower abdominal tenderness without rebound, guarding or signs of peritonitis.  Labs are notable for no leukocytosis, no significant electrolyte derangements.  She is not anemic.  She has normal renal function.  Pregnancy test is negative and ectopic pregnancy ruled out.  Lipase is normal and she does not have pancreatitis.  No right upper quadrant tenderness and presentation is not consistent with acute cholecystitis.  CT scan was obtained which is notable for terminal ileal wall thickening which appears similar to prior study.  She also has enlarged lymph nodes suggestive of mesenteric adenitis and hepatomegaly.    She was treated in the ER with pantoprazole, Pepcid, morphine and Zofran after which she reports improvement.    20.31 PM  I discussed with Dr. Jones (GI).  He reviewed the patient's case and the patient's imaging.  He suggested inpatient admission for colonoscopy in the morning as he does have concern for possible inflammatory bowel disease.  She has already completed a course of antibiotics and therefore seems much less likely infectious in nature.    Patient is amenable to admission to the hospital and colonoscopy.      Of note possible UTI, will send for culture.  She has no symptoms and will hold on treatment until culture results.           DISPOSITION AND DISCUSSIONS  I have discussed management of the patient with the following physicians and JOSEPH's:  Dr Jones.  Dr. Saunders (hospitalist)    Discussion of management with other [facility] or appropriate source(s): None     FINAL DIAGNOSIS  1. Terminal ileitis without complication (HCC) Acute   2. Pyuria Acute   3. Lower abdominal pain Acute          Electronically signed by: Selin Brennan M.D., 9/27/2023 6:44 PM

## 2023-09-29 LAB
BACTERIA UR CULT: NORMAL
SIGNIFICANT IND 70042: NORMAL
SITE SITE: NORMAL
SOURCE SOURCE: NORMAL

## 2023-09-30 LAB — CALPROTECTIN STL-MCNT: 663 UG/G

## 2023-10-04 ENCOUNTER — OFFICE VISIT (OUTPATIENT)
Dept: INTERNAL MEDICINE | Facility: OTHER | Age: 23
End: 2023-10-04
Payer: MEDICAID

## 2023-10-04 VITALS
HEIGHT: 64 IN | DIASTOLIC BLOOD PRESSURE: 79 MMHG | WEIGHT: 282.4 LBS | HEART RATE: 80 BPM | BODY MASS INDEX: 48.21 KG/M2 | OXYGEN SATURATION: 97 % | SYSTOLIC BLOOD PRESSURE: 110 MMHG

## 2023-10-04 DIAGNOSIS — Z80.3 FAMILY HISTORY OF BREAST CANCER: ICD-10-CM

## 2023-10-04 DIAGNOSIS — G89.29 CHRONIC PAIN OF LEFT ANKLE: ICD-10-CM

## 2023-10-04 DIAGNOSIS — K21.9 GASTROESOPHAGEAL REFLUX DISEASE WITHOUT ESOPHAGITIS: ICD-10-CM

## 2023-10-04 DIAGNOSIS — T14.91XA SUICIDAL BEHAVIOR WITH ATTEMPTED SELF-INJURY (HCC): ICD-10-CM

## 2023-10-04 DIAGNOSIS — K50.00 CROHN'S DISEASE OF SMALL INTESTINE WITHOUT COMPLICATION (HCC): Primary | ICD-10-CM

## 2023-10-04 DIAGNOSIS — Z00.00 HEALTHCARE MAINTENANCE: ICD-10-CM

## 2023-10-04 DIAGNOSIS — F32.3 MAJOR DEPRESSIVE DISORDER WITH PSYCHOTIC FEATURES (HCC): ICD-10-CM

## 2023-10-04 DIAGNOSIS — E11.9 TYPE 2 DIABETES, DIET CONTROLLED (HCC): ICD-10-CM

## 2023-10-04 DIAGNOSIS — F60.3 BORDERLINE PERSONALITY DISORDER (HCC): ICD-10-CM

## 2023-10-04 DIAGNOSIS — G47.30 SLEEP APNEA, UNSPECIFIED TYPE: ICD-10-CM

## 2023-10-04 DIAGNOSIS — Q66.02 CONGENITAL TALIPES EQUINOVARUS DEFORMITY OF LEFT FOOT: ICD-10-CM

## 2023-10-04 DIAGNOSIS — M25.572 CHRONIC PAIN OF LEFT ANKLE: ICD-10-CM

## 2023-10-04 DIAGNOSIS — N92.6 IRREGULAR MENSES: ICD-10-CM

## 2023-10-04 PROBLEM — Z86.69 HISTORY OF MIGRAINE: Status: ACTIVE | Noted: 2023-10-04

## 2023-10-04 PROBLEM — R45.851 SUICIDAL IDEATION: Status: RESOLVED | Noted: 2021-09-24 | Resolved: 2023-10-04

## 2023-10-04 LAB
HBA1C MFR BLD: 5.5 % (ref ?–5.8)
POCT INT CON NEG: NEGATIVE
POCT INT CON POS: POSITIVE

## 2023-10-04 PROCEDURE — 3078F DIAST BP <80 MM HG: CPT | Mod: GC

## 2023-10-04 PROCEDURE — RXMED WILLOW AMBULATORY MEDICATION CHARGE

## 2023-10-04 PROCEDURE — 3074F SYST BP LT 130 MM HG: CPT | Mod: GC

## 2023-10-04 PROCEDURE — 99215 OFFICE O/P EST HI 40 MIN: CPT | Mod: GC,25

## 2023-10-04 PROCEDURE — 83036 HEMOGLOBIN GLYCOSYLATED A1C: CPT | Mod: GC

## 2023-10-04 RX ORDER — PREDNISONE 10 MG/1
TABLET ORAL
Qty: 74 TABLET | Refills: 0 | Status: SHIPPED | OUTPATIENT
Start: 2023-10-04 | End: 2023-11-08

## 2023-10-04 SDOH — ECONOMIC STABILITY: HOUSING INSECURITY
IN THE LAST 12 MONTHS, WAS THERE A TIME WHEN YOU DID NOT HAVE A STEADY PLACE TO SLEEP OR SLEPT IN A SHELTER (INCLUDING NOW)?: YES

## 2023-10-04 SDOH — HEALTH STABILITY: PHYSICAL HEALTH: ON AVERAGE, HOW MANY MINUTES DO YOU ENGAGE IN EXERCISE AT THIS LEVEL?: 40 MIN

## 2023-10-04 SDOH — HEALTH STABILITY: PHYSICAL HEALTH: ON AVERAGE, HOW MANY DAYS PER WEEK DO YOU ENGAGE IN MODERATE TO STRENUOUS EXERCISE (LIKE A BRISK WALK)?: 2 DAYS

## 2023-10-04 SDOH — ECONOMIC STABILITY: FOOD INSECURITY: WITHIN THE PAST 12 MONTHS, YOU WORRIED THAT YOUR FOOD WOULD RUN OUT BEFORE YOU GOT MONEY TO BUY MORE.: OFTEN TRUE

## 2023-10-04 SDOH — HEALTH STABILITY: MENTAL HEALTH
STRESS IS WHEN SOMEONE FEELS TENSE, NERVOUS, ANXIOUS, OR CAN'T SLEEP AT NIGHT BECAUSE THEIR MIND IS TROUBLED. HOW STRESSED ARE YOU?: VERY MUCH

## 2023-10-04 SDOH — ECONOMIC STABILITY: INCOME INSECURITY: HOW HARD IS IT FOR YOU TO PAY FOR THE VERY BASICS LIKE FOOD, HOUSING, MEDICAL CARE, AND HEATING?: SOMEWHAT HARD

## 2023-10-04 SDOH — ECONOMIC STABILITY: FOOD INSECURITY: WITHIN THE PAST 12 MONTHS, THE FOOD YOU BOUGHT JUST DIDN'T LAST AND YOU DIDN'T HAVE MONEY TO GET MORE.: SOMETIMES TRUE

## 2023-10-04 SDOH — ECONOMIC STABILITY: TRANSPORTATION INSECURITY
IN THE PAST 12 MONTHS, HAS THE LACK OF TRANSPORTATION KEPT YOU FROM MEDICAL APPOINTMENTS OR FROM GETTING MEDICATIONS?: YES

## 2023-10-04 SDOH — ECONOMIC STABILITY: HOUSING INSECURITY: IN THE LAST 12 MONTHS, HOW MANY PLACES HAVE YOU LIVED?: 5

## 2023-10-04 SDOH — ECONOMIC STABILITY: INCOME INSECURITY: IN THE LAST 12 MONTHS, WAS THERE A TIME WHEN YOU WERE NOT ABLE TO PAY THE MORTGAGE OR RENT ON TIME?: YES

## 2023-10-04 SDOH — ECONOMIC STABILITY: TRANSPORTATION INSECURITY
IN THE PAST 12 MONTHS, HAS LACK OF RELIABLE TRANSPORTATION KEPT YOU FROM MEDICAL APPOINTMENTS, MEETINGS, WORK OR FROM GETTING THINGS NEEDED FOR DAILY LIVING?: YES

## 2023-10-04 SDOH — ECONOMIC STABILITY: TRANSPORTATION INSECURITY
IN THE PAST 12 MONTHS, HAS LACK OF TRANSPORTATION KEPT YOU FROM MEETINGS, WORK, OR FROM GETTING THINGS NEEDED FOR DAILY LIVING?: YES

## 2023-10-04 ASSESSMENT — LIFESTYLE VARIABLES
HOW MANY STANDARD DRINKS CONTAINING ALCOHOL DO YOU HAVE ON A TYPICAL DAY: PATIENT DOES NOT DRINK
HOW OFTEN DO YOU HAVE SIX OR MORE DRINKS ON ONE OCCASION: NEVER
AUDIT-C TOTAL SCORE: 0
SUBSTANCE_ABUSE: 0
SKIP TO QUESTIONS 9-10: 1
HOW OFTEN DO YOU HAVE A DRINK CONTAINING ALCOHOL: NEVER

## 2023-10-04 ASSESSMENT — SOCIAL DETERMINANTS OF HEALTH (SDOH)
HOW MANY DRINKS CONTAINING ALCOHOL DO YOU HAVE ON A TYPICAL DAY WHEN YOU ARE DRINKING: PATIENT DOES NOT DRINK
WITHIN THE PAST 12 MONTHS, YOU WORRIED THAT YOUR FOOD WOULD RUN OUT BEFORE YOU GOT THE MONEY TO BUY MORE: OFTEN TRUE
IN A TYPICAL WEEK, HOW MANY TIMES DO YOU TALK ON THE PHONE WITH FAMILY, FRIENDS, OR NEIGHBORS?: ONCE A WEEK
HOW OFTEN DO YOU HAVE SIX OR MORE DRINKS ON ONE OCCASION: NEVER
HOW OFTEN DO YOU ATTENT MEETINGS OF THE CLUB OR ORGANIZATION YOU BELONG TO?: NEVER
ARE YOU MARRIED, WIDOWED, DIVORCED, SEPARATED, NEVER MARRIED, OR LIVING WITH A PARTNER?: NEVER MARRIED
HOW OFTEN DO YOU GET TOGETHER WITH FRIENDS OR RELATIVES?: TWICE A WEEK
IN A TYPICAL WEEK, HOW MANY TIMES DO YOU TALK ON THE PHONE WITH FAMILY, FRIENDS, OR NEIGHBORS?: ONCE A WEEK
DO YOU BELONG TO ANY CLUBS OR ORGANIZATIONS SUCH AS CHURCH GROUPS UNIONS, FRATERNAL OR ATHLETIC GROUPS, OR SCHOOL GROUPS?: NO
HOW OFTEN DO YOU ATTEND CHURCH OR RELIGIOUS SERVICES?: NEVER
DO YOU BELONG TO ANY CLUBS OR ORGANIZATIONS SUCH AS CHURCH GROUPS UNIONS, FRATERNAL OR ATHLETIC GROUPS, OR SCHOOL GROUPS?: NO
HOW OFTEN DO YOU ATTENT MEETINGS OF THE CLUB OR ORGANIZATION YOU BELONG TO?: NEVER
ARE YOU MARRIED, WIDOWED, DIVORCED, SEPARATED, NEVER MARRIED, OR LIVING WITH A PARTNER?: NEVER MARRIED
HOW HARD IS IT FOR YOU TO PAY FOR THE VERY BASICS LIKE FOOD, HOUSING, MEDICAL CARE, AND HEATING?: SOMEWHAT HARD
HOW OFTEN DO YOU GET TOGETHER WITH FRIENDS OR RELATIVES?: TWICE A WEEK
HOW OFTEN DO YOU ATTEND CHURCH OR RELIGIOUS SERVICES?: NEVER
HOW OFTEN DO YOU HAVE A DRINK CONTAINING ALCOHOL: NEVER

## 2023-10-04 ASSESSMENT — ENCOUNTER SYMPTOMS
HEADACHES: 0
WEAKNESS: 0
HEARTBURN: 0
BLURRED VISION: 0
WEIGHT LOSS: 0
VOMITING: 1
NAUSEA: 1
COUGH: 0
HEMOPTYSIS: 0
PALPITATIONS: 0
BLOOD IN STOOL: 0
ABDOMINAL PAIN: 1
FEVER: 0
EYE PAIN: 0
SHORTNESS OF BREATH: 0
DEPRESSION: 0
INSOMNIA: 0
NERVOUS/ANXIOUS: 0
DIZZINESS: 0
DIARRHEA: 1

## 2023-10-04 ASSESSMENT — FIBROSIS 4 INDEX: FIB4 SCORE: 0.19

## 2023-10-04 NOTE — PROGRESS NOTES
New Patient    Katie Graham is a 23 y.o. female who presents today with the following:    CC: Establish Care with Primary Care Physician follow up crohn's disease    HPI:  Problem   Crohn's Disease of Small Intestine Without Complication ([facility])    Reports long time history of abnormal bowel movements and hematemesis, previously diagnosed with GERD and stomach ulcer though never EGD.  Recent ED visits with colonscopy concerning for Crohn's, confirmed on pathology.  Discharge from hospital ~ 1 week ago. Since then reports 3-4 water/loose BM/day, no blood  Ongoing nausea and vomitting with eating. Last episode of emesis was yesterday.  She was not started on medications at hospital.  She has been taking tylenol for RLQ pain, not helping.   Has not been tolerating food other than lollipops and popsicles. Drinking ok, staying hydrated with pedialyte.          Class 3 Severe Obesity in Adult ([facility])   Family History of Breast Cancer    Mother recently passed from metastatic Breast Cancer     Type 2 Diabetes, Diet Controlled ([facility])    History of A1c's up to 8.3  Patient checks home BG twice daily  Controls with diet  Has not had A1c in over a year       Sleep Apnea    Diagnosed at 16, lost to follow up before ever starting CPAP or other therapy.     Irregular Menses    Has been irregular for years.   Previously tested for PCOS which was negative.  Has never seen gynecology.     Gastroesophageal Reflux Disease Without Esophagitis    Reports prior diagnosis.  No recent hematemesis.  Not on medication.     Chronic Pain of Left Ankle    Due to congenital deformity of left foot.   Has difficulty walking and usually ambulates with a cane.     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  Continues to have issues with this and uses a cane to ambulate.  She was last evaluated by a pediatric orthopedic doctor at age 19.      Healthcare  Maintenance    Patient reports being up to date with STI screening. Last done  ~ 1.5 years ago after last partner. Has never tested positive.    Vaccines:  Due for COVID and flu.   Possibly Hep B?   Pneumonia vaccine    Cancer Screening:  Has never had PAP.     History of Migraine   Borderline Personality Disorder (Hcc)    Has been in remission for 1 year  Off meds for 2 years     Suicidal Behavior With Attempted Self-Injury (Hcc)    Last attempt 2 years ago  Spent some time at inpatient psych  Denies SI/HI today  Stable, not on medications  Does CBT weekly     Major Depressive Disorder With Psychotic Features ([facility])    [facility].  Has been there for few months.  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   Eyes:  Negative for blurred vision and pain.   Respiratory:  Negative for cough, hemoptysis and shortness of breath.    Cardiovascular:  Negative for chest pain, palpitations and leg swelling.   Gastrointestinal:  Positive for abdominal pain, diarrhea, nausea and vomiting. Negative for blood in stool, heartburn and melena.   Genitourinary:  Negative for dysuria and urgency.   Musculoskeletal:  Positive for joint pain.   Skin:  Negative for rash.   Neurological:  Negative for dizziness, weakness and headaches.   Psychiatric/Behavioral:  Negative for depression, substance abuse and suicidal ideas. The patient is not nervous/anxious and does not have insomnia.          Past Medical History:   Diagnosis Date    Allergy     Apples cause migrains and fainting    Anxiety     Borderline personality disorder (HCC)     Depression     Family history of diabetes mellitus 11/04/2020    GERD (gastroesophageal reflux disease)     Head ache     Mast cell activation syndrome (HCC)     Migraine     POTS (postural orthostatic tachycardia syndrome)     Substance abuse (HCC)      xanax and alcohol in highschool    Suicidal ideation 2021       Past Surgical History:   Procedure Laterality Date    SD COLONOSCOPY,DIAGNOSTIC N/A 2023    Procedure: COLONOSCOPY;  Surgeon: Jamison Jones M.D.;  Location: SURGERY SAME DAY [facility];  Service: Gastroenterology    SD COLONOSCOPY,BIOPSY N/A 2023    Procedure: COLONOSCOPY, WITH BIOPSY;  Surgeon: Jamison Jones M.D.;  Location: SURGERY SAME DAY [facility];  Service: Gastroenterology    LAPAROSCOPIC UMBILICAL HERNIA REPAIR         Family History   Problem Relation Age of Onset    Cancer Mother 54        bone cancer with mets    Diabetes Mother 30        Type 2    Alcohol abuse Mother         sober 18 months    Hypertension Father     Alcohol abuse Father     Kidney Disease Father     Diabetes Sister         type 2    Genetic Disorder Sister         club foot    No Known Problems Sister     No Known Problems Brother     Lung Disease Maternal Aunt     Alcohol abuse Maternal Aunt     Cancer Maternal Grandmother         Lung    Alcohol abuse Maternal Grandmother     Diabetes Maternal Grandfather         type 2    Heart Disease Maternal Grandfather     Hypertension Maternal Grandfather     Hyperlipidemia Maternal Grandfather     Alcohol abuse Maternal Grandfather     Heart Disease Paternal Grandmother     Hypertension Paternal Grandmother     Hyperlipidemia Paternal Grandmother     Alcohol abuse Paternal Grandfather     Cancer Maternal great-grandmother         breast       Social History     Tobacco Use    Smoking status: Former     Current packs/day: 0.00     Average packs/day: 0.1 packs/day for 0.2 years     Types: Cigarettes     Start date: 2018     Quit date: 2018     Years since quittin.9    Smokeless tobacco: Never    Tobacco comments:     Quit after 3 months   Vaping Use    Vaping Use: Former    Substances: Nicotine, Flavoring, 0 anali     Devices: [employer] tank   Substance Use Topics    Alcohol use: Not Currently      "Alcohol/week: 3.6 oz     Types: 6 Shots of liquor per week     Comment: none since 6/16/23    Drug use: Yes     Types: Marijuana, Inhaled     Comment: past hx of using multiple drugs in high school       Current Outpatient Medications   Medication Sig Dispense Refill    predniSONE (DELTASONE) 10 MG Tab Take 4 Tablets by mouth every day for 7 days, THEN 3 Tablets every day for 7 days, THEN 2 Tablets every day for 7 days, THEN 1 Tablet every day for 7 days, THEN 0.5 Tablets every day for 7 days. (Please call pharmacy for refills and continuation of therapy) 74 Tablet 0     No current facility-administered medications for this visit.       Physical Exam:  /79 (BP Location: Left arm, Patient Position: Sitting, BP Cuff Size: Adult)   Pulse 80   Ht 1.626 m (5' 4\")   Wt (!) 128 kg (282 lb 6.4 oz)   SpO2 97%   BMI 48.47 kg/m²   General: Well-developed, well-nourished female in no distress  HEENT: Conjuntiva and lids are within normal limits.  External auditory canals are within normal limits.  Oral pharynx is normal and free of exudate.  Neck: Supple, non-tender with nothyromegaly noted.  Lympatic: Pre and Post auricular, sub-mandibular, anterior and posterior cervical and supraclavicular areas are without notable lymphadenopathy  Lungs: Clear to auscultation and perucssion bilaterally with good respiratory effort.  No wheezes, crackes or rhonci are heard.  Heart: Normal rate, regular rhythm with no murmurs, rubs or gallops heard.  Abdomen: Soft, non-distended. Hyperactive bowel sounds with significant tenderness to palpation, worse in the RLQ.  Extremites: No edema  Psych: Patient is awake, alert and oriented with recent and remote memory intact. Mood and affect are normal. No SI/HI.    Assessment and Plan:     1. Crohn's disease of small intestine without complication (HCC)  No alarm features today with active bleeding though patient is in active flare. She has yet to start treatment and is unable to keep food " down.  Contacted [facility] and was able to expedite patient being seen. Appointment scheduled for 10/10.  In the meantime will check b12 and iron levels.  Will check TB in preparation for biologic medication.   Will treat with steroid taper. Side effect profile reviewed.     - VITAMIN B12; Future  - IRON/TOTAL IRON BIND; Future  - Referral to Nutrition Services  - predniSONE (DELTASONE) 10 MG Tab; Take 4 Tablets by mouth every day for 7 days, THEN 3 Tablets every day for 7 days, THEN 2 Tablets every day for 7 days, THEN 1 Tablet every day for 7 days, THEN 0.5 Tablets every day for 7 days. (Please call pharmacy for refills and continuation of therapy)  Dispense: 74 Tablet; Refill: 0  - Quantiferon Gold TB (PPD); Future    2. Type 2 diabetes, diet controlled (HCC)  A1c in office is 5.5, diet controlled  Discussed will likely have elevated BG with steroid use.   Will follow up in 5-6 weeks  - MICROALBUMIN CREAT RATIO URINE; Future  - POCT  A1C    3. Sleep apnea, unspecified type  Previously diagnosed though never treated.  Will need a new sleep specialist referral, to be addressed at future visit.    4. Chronic pain of left ankle  5. 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   Will consider ortho referral at next visit    6. Major depressive disorder with psychotic features (HCC)  7. Borderline personality disorder (HCC)  8. Suicidal behavior with attempted self-injury (HCC)  Stable.   Continue CBT    9. Healthcare maintenance  10. Family history of breast cancer  11. Irregular menses    Patient to get flu and covid booster at her pharmacy. Reviewed importance given will be on immunosuppressive therapy.  Up to date on STI screening though has never had PAP.   Will need to consider BRCA1/2 testing given mother with breast cancer    Will try to schedule for pap/wellness visit within next 3 months.     Return in about 5 weeks (around 11/8/2023)  for crohn's, diabetes.    Patient Instructions   It was nice meeting you today Max!    Today we reviewed your medical history as well as your recent hospitalization for new diagnosis of Crohn's disease.    Have ordered some follow-up labs for the diagnosis of Crohn's to check your additional status as well as a screening for TB to potentially start Biologics.  We have reached out to GI consultants and you should begin a phone call for a appointment to establish care with them.  In the meantime have sent in a prescription for a steroid taper.  Please take this as prescribed.    While on the prednisone given history of elevated blood sugars will also start on medication called metformin.    You are due for a COVID booster and flu shot.  This will be particularly important given starting the immunosuppressive medications.  Encourage you to this at the pharmacy of your choice at your convenience.    Have placed a referral to nutrition to assist with meal planning going forward with a new diagnosis of Crohn's disease.  In the meantime of also provided a handout for you for a low fiber eating plan.  Encourage you to follow this fairly strictly until things quiet down.    Lets plan to follow-up in 5 weeks to make sure you are able to see the GI doctor.  At that time we will also go over some of your other medical issues.    Feel free to reach out sooner if needed.      Signed by: Zully Medina M.D.    Zully Medina M.D. PGY3  [facility]

## 2023-10-04 NOTE — PATIENT INSTRUCTIONS
It was nice meeting you today Max!    Today we reviewed your medical history as well as your recent hospitalization for new diagnosis of Crohn's disease.    Have ordered some follow-up labs for the diagnosis of Crohn's to check your additional status as well as a screening for TB to potentially start Biologics.  We have reached out to GI consultants and you should begin a phone call for a appointment to establish care with them.  In the meantime have sent in a prescription for a steroid taper.  Please take this as prescribed.    While on the prednisone given history of elevated blood sugars will also start on medication called metformin.    You are due for a COVID booster and flu shot.  This will be particularly important given starting the immunosuppressive medications.  Encourage you to this at the pharmacy of your choice at your convenience.    Have placed a referral to nutrition to assist with meal planning going forward with a new diagnosis of Crohn's disease.  In the meantime of also provided a handout for you for a low fiber eating plan.  Encourage you to follow this fairly strictly until things quiet down.    Lets plan to follow-up in 5 weeks to make sure you are able to see the GI doctor.  At that time we will also go over some of your other medical issues.    Feel free to reach out sooner if needed.

## 2023-10-05 ENCOUNTER — TELEPHONE (OUTPATIENT)
Dept: INTERNAL MEDICINE | Facility: OTHER | Age: 23
End: 2023-10-05
Payer: MEDICAID

## 2023-10-06 ENCOUNTER — PHARMACY VISIT (OUTPATIENT)
Dept: PHARMACY | Facility: MEDICAL CENTER | Age: 23
End: 2023-10-06
Payer: COMMERCIAL

## 2023-10-12 ENCOUNTER — PHARMACY VISIT (OUTPATIENT)
Dept: PHARMACY | Facility: MEDICAL CENTER | Age: 23
End: 2023-10-12
Payer: COMMERCIAL

## 2023-10-12 ENCOUNTER — HOSPITAL ENCOUNTER (EMERGENCY)
Facility: MEDICAL CENTER | Age: 23
End: 2023-10-12
Attending: EMERGENCY MEDICINE
Payer: MEDICAID

## 2023-10-12 VITALS
HEART RATE: 89 BPM | TEMPERATURE: 98.9 F | WEIGHT: 282 LBS | OXYGEN SATURATION: 94 % | SYSTOLIC BLOOD PRESSURE: 116 MMHG | HEIGHT: 64 IN | RESPIRATION RATE: 17 BRPM | BODY MASS INDEX: 48.14 KG/M2 | DIASTOLIC BLOOD PRESSURE: 73 MMHG

## 2023-10-12 DIAGNOSIS — J02.0 STREP THROAT: ICD-10-CM

## 2023-10-12 DIAGNOSIS — R59.0 CERVICAL ADENOPATHY: ICD-10-CM

## 2023-10-12 DIAGNOSIS — J02.9 SORE THROAT: ICD-10-CM

## 2023-10-12 LAB
ANION GAP SERPL CALC-SCNC: 10 MMOL/L (ref 7–16)
BASOPHILS # BLD AUTO: 0.5 % (ref 0–1.8)
BASOPHILS # BLD: 0.09 K/UL (ref 0–0.12)
BUN SERPL-MCNC: 7 MG/DL (ref 8–22)
CALCIUM SERPL-MCNC: 8.3 MG/DL (ref 8.5–10.5)
CHLORIDE SERPL-SCNC: 103 MMOL/L (ref 96–112)
CO2 SERPL-SCNC: 23 MMOL/L (ref 20–33)
CREAT SERPL-MCNC: 0.68 MG/DL (ref 0.5–1.4)
EKG IMPRESSION: NORMAL
EOSINOPHIL # BLD AUTO: 0.14 K/UL (ref 0–0.51)
EOSINOPHIL NFR BLD: 0.8 % (ref 0–6.9)
ERYTHROCYTE [DISTWIDTH] IN BLOOD BY AUTOMATED COUNT: 43.1 FL (ref 35.9–50)
FLUAV RNA SPEC QL NAA+PROBE: NEGATIVE
FLUBV RNA SPEC QL NAA+PROBE: NEGATIVE
GFR SERPLBLD CREATININE-BSD FMLA CKD-EPI: 127 ML/MIN/1.73 M 2
GLUCOSE SERPL-MCNC: 136 MG/DL (ref 65–99)
HCG SERPL QL: NEGATIVE
HCT VFR BLD AUTO: 37.7 % (ref 37–47)
HETEROPH AB SER QL: NEGATIVE
HGB BLD-MCNC: 12.5 G/DL (ref 12–16)
IMM GRANULOCYTES # BLD AUTO: 0.12 K/UL (ref 0–0.11)
IMM GRANULOCYTES NFR BLD AUTO: 0.7 % (ref 0–0.9)
LYMPHOCYTES # BLD AUTO: 2.65 K/UL (ref 1–4.8)
LYMPHOCYTES NFR BLD: 15.9 % (ref 22–41)
MCH RBC QN AUTO: 26.9 PG (ref 27–33)
MCHC RBC AUTO-ENTMCNC: 33.2 G/DL (ref 32.2–35.5)
MCV RBC AUTO: 81.3 FL (ref 81.4–97.8)
MONOCYTES # BLD AUTO: 1.29 K/UL (ref 0–0.85)
MONOCYTES NFR BLD AUTO: 7.7 % (ref 0–13.4)
NEUTROPHILS # BLD AUTO: 12.42 K/UL (ref 1.82–7.42)
NEUTROPHILS NFR BLD: 74.4 % (ref 44–72)
NRBC # BLD AUTO: 0 K/UL
NRBC BLD-RTO: 0 /100 WBC (ref 0–0.2)
PLATELET # BLD AUTO: 421 K/UL (ref 164–446)
PMV BLD AUTO: 10.2 FL (ref 9–12.9)
POTASSIUM SERPL-SCNC: 3.9 MMOL/L (ref 3.6–5.5)
RBC # BLD AUTO: 4.64 M/UL (ref 4.2–5.4)
RSV RNA SPEC QL NAA+PROBE: NEGATIVE
S PYO DNA SPEC NAA+PROBE: DETECTED
SARS-COV-2 RNA RESP QL NAA+PROBE: NOTDETECTED
SODIUM SERPL-SCNC: 136 MMOL/L (ref 135–145)
WBC # BLD AUTO: 16.7 K/UL (ref 4.8–10.8)

## 2023-10-12 PROCEDURE — 36415 COLL VENOUS BLD VENIPUNCTURE: CPT

## 2023-10-12 PROCEDURE — 93005 ELECTROCARDIOGRAM TRACING: CPT | Performed by: EMERGENCY MEDICINE

## 2023-10-12 PROCEDURE — 80048 BASIC METABOLIC PNL TOTAL CA: CPT

## 2023-10-12 PROCEDURE — 85025 COMPLETE CBC W/AUTO DIFF WBC: CPT

## 2023-10-12 PROCEDURE — C9803 HOPD COVID-19 SPEC COLLECT: HCPCS

## 2023-10-12 PROCEDURE — A9270 NON-COVERED ITEM OR SERVICE: HCPCS | Mod: UD | Performed by: EMERGENCY MEDICINE

## 2023-10-12 PROCEDURE — 99285 EMERGENCY DEPT VISIT HI MDM: CPT

## 2023-10-12 PROCEDURE — 700111 HCHG RX REV CODE 636 W/ 250 OVERRIDE (IP): Mod: JZ,UD | Performed by: EMERGENCY MEDICINE

## 2023-10-12 PROCEDURE — 700105 HCHG RX REV CODE 258: Mod: UD | Performed by: EMERGENCY MEDICINE

## 2023-10-12 PROCEDURE — 700102 HCHG RX REV CODE 250 W/ 637 OVERRIDE(OP): Mod: UD | Performed by: EMERGENCY MEDICINE

## 2023-10-12 PROCEDURE — 0241U HCHG SARS-COV-2 COVID-19 NFCT DS RESP RNA 4 TRGT ED POC: CPT

## 2023-10-12 PROCEDURE — 96375 TX/PRO/DX INJ NEW DRUG ADDON: CPT

## 2023-10-12 PROCEDURE — 87651 STREP A DNA AMP PROBE: CPT

## 2023-10-12 PROCEDURE — 84703 CHORIONIC GONADOTROPIN ASSAY: CPT

## 2023-10-12 PROCEDURE — 86308 HETEROPHILE ANTIBODY SCREEN: CPT

## 2023-10-12 PROCEDURE — 96374 THER/PROPH/DIAG INJ IV PUSH: CPT

## 2023-10-12 PROCEDURE — RXMED WILLOW AMBULATORY MEDICATION CHARGE: Performed by: EMERGENCY MEDICINE

## 2023-10-12 RX ORDER — SODIUM CHLORIDE 9 MG/ML
1000 INJECTION, SOLUTION INTRAVENOUS ONCE
Status: COMPLETED | OUTPATIENT
Start: 2023-10-12 | End: 2023-10-12

## 2023-10-12 RX ORDER — AMOXICILLIN 500 MG/1
1000 CAPSULE ORAL DAILY
Qty: 20 CAPSULE | Refills: 0 | Status: ACTIVE | OUTPATIENT
Start: 2023-10-12 | End: 2023-10-22

## 2023-10-12 RX ORDER — ONDANSETRON 2 MG/ML
4 INJECTION INTRAMUSCULAR; INTRAVENOUS ONCE
Status: COMPLETED | OUTPATIENT
Start: 2023-10-12 | End: 2023-10-12

## 2023-10-12 RX ORDER — KETOROLAC TROMETHAMINE 30 MG/ML
30 INJECTION, SOLUTION INTRAMUSCULAR; INTRAVENOUS ONCE
Status: COMPLETED | OUTPATIENT
Start: 2023-10-12 | End: 2023-10-12

## 2023-10-12 RX ORDER — CEFTRIAXONE 2 G/1
2000 INJECTION, POWDER, FOR SOLUTION INTRAMUSCULAR; INTRAVENOUS ONCE
Status: COMPLETED | OUTPATIENT
Start: 2023-10-12 | End: 2023-10-12

## 2023-10-12 RX ORDER — DEXAMETHASONE SODIUM PHOSPHATE 4 MG/ML
12 INJECTION, SOLUTION INTRA-ARTICULAR; INTRALESIONAL; INTRAMUSCULAR; INTRAVENOUS; SOFT TISSUE ONCE
Status: COMPLETED | OUTPATIENT
Start: 2023-10-12 | End: 2023-10-12

## 2023-10-12 RX ADMIN — DEXAMETHASONE SODIUM PHOSPHATE 12 MG: 4 INJECTION INTRA-ARTICULAR; INTRALESIONAL; INTRAMUSCULAR; INTRAVENOUS; SOFT TISSUE at 08:42

## 2023-10-12 RX ADMIN — SODIUM CHLORIDE 1000 ML: 9 INJECTION, SOLUTION INTRAVENOUS at 08:39

## 2023-10-12 RX ADMIN — ONDANSETRON 4 MG: 2 INJECTION INTRAMUSCULAR; INTRAVENOUS at 08:40

## 2023-10-12 RX ADMIN — BENZOCAINE AND MENTHOL 1 LOZENGE: 15; 3.6 LOZENGE ORAL at 09:50

## 2023-10-12 RX ADMIN — KETOROLAC TROMETHAMINE 30 MG: 30 INJECTION, SOLUTION INTRAMUSCULAR; INTRAVENOUS at 08:40

## 2023-10-12 RX ADMIN — CEFTRIAXONE SODIUM 2000 MG: 2 INJECTION, POWDER, FOR SOLUTION INTRAMUSCULAR; INTRAVENOUS at 09:50

## 2023-10-12 ASSESSMENT — FIBROSIS 4 INDEX: FIB4 SCORE: 0.19

## 2023-10-12 NOTE — ED TRIAGE NOTES
Katie Graham  23 y.o. female  Chief Complaint   Patient presents with    Dizziness     Felt dizzy and fell. Denies LOC but unsure it she hit her head.     Difficulty Swallowing     States right side of the neck hurts and difficult to swallow. Denies cough.      Pt BIB EMS for above complaint from the [facility]. On wheelchair to triage. Per EMS, temperature was 102F and gave APAP 1000 mg.    Pt is GCS 15, speaking in full sentences, follows commands and responds appropriately to questions. Resp are even and unlabored.      Pt placed in ED lobby. Pt educated on triage process. Pt encouraged to alert staff for any changes.       Vitals:    10/12/23 0635   BP: (!) 133/96   Pulse: (!) 122   Resp: 18   Temp: 37.9 °C (100.3 °F)   SpO2: 97%

## 2023-10-12 NOTE — ED PROVIDER NOTES
ER Provider Note    Scribed for Kamran Calderon M.D. by Lorena Langford. 10/12/2023   7:44 AM    Primary Care Provider: Zully Medina M.D.    CHIEF COMPLAINT  Chief Complaint   Patient presents with    Dizziness     Felt dizzy and fell. Denies LOC but unsure it she hit her head.     Difficulty Swallowing     States right side of the neck hurts and difficult to swallow. Denies cough.      EXTERNAL RECORDS REVIEWED  Outpatient Notes The patient was seen in office on 10/4/23 for a follow up of her Crohn's Disease.    HPI/ROS  LIMITATION TO HISTORY   Select: : None  OUTSIDE HISTORIAN(S):  None noted    Katie Graham is a 23 y.o. female who presents to the ED for evaluation of right sided neck swelling and difficulty swallowing when she woke up. Additionally, the patient reports one episode of light headedness. She denies any loss of consciousness. She denies any cough, cold-like symptoms or fever. No alleviating factors attempted.     PAST MEDICAL HISTORY  Past Medical History:   Diagnosis Date    Allergy     Apples cause migrains and fainting    Anxiety     Borderline personality disorder (HCC)     Depression     Family history of diabetes mellitus 11/04/2020    GERD (gastroesophageal reflux disease)     Head ache     Mast cell activation syndrome (HCC)     Migraine     POTS (postural orthostatic tachycardia syndrome)     Substance abuse (HCC)     xanax and alcohol in highschool    Suicidal ideation 9/24/2021       SURGICAL HISTORY  Past Surgical History:   Procedure Laterality Date    OH COLONOSCOPY,DIAGNOSTIC N/A 9/28/2023    Procedure: COLONOSCOPY;  Surgeon: Jamison Jones M.D.;  Location: SURGERY SAME DAY [facility];  Service: Gastroenterology    OH COLONOSCOPY,BIOPSY N/A 9/28/2023    Procedure: COLONOSCOPY, WITH BIOPSY;  Surgeon: Jamison Jones M.D.;  Location: SURGERY SAME DAY [facility];  Service: Gastroenterology    LAPAROSCOPIC UMBILICAL HERNIA REPAIR  2002       FAMILY HISTORY  Family History   Problem  "Relation Age of Onset    Cancer Mother 54        bone cancer with mets    Diabetes Mother 30        Type 2    Alcohol abuse Mother         sober 18 months    Hypertension Father     Alcohol abuse Father     Kidney Disease Father     Diabetes Sister         type 2    Genetic Disorder Sister         club foot    No Known Problems Sister     No Known Problems Brother     Lung Disease Maternal Aunt     Alcohol abuse Maternal Aunt     Cancer Maternal Grandmother         Lung    Alcohol abuse Maternal Grandmother     Diabetes Maternal Grandfather         type 2    Heart Disease Maternal Grandfather     Hypertension Maternal Grandfather     Hyperlipidemia Maternal Grandfather     Alcohol abuse Maternal Grandfather     Heart Disease Paternal Grandmother     Hypertension Paternal Grandmother     Hyperlipidemia Paternal Grandmother     Alcohol abuse Paternal Grandfather     Cancer Maternal great-grandmother         breast       SOCIAL HISTORY   reports that she quit smoking about 4 years ago. Her smoking use included cigarettes. She started smoking about 5 years ago. She smoked an average 0.1 packs per day for 0.2 years. She has never used smokeless tobacco. She reports that she does not currently use alcohol after a past usage of about 3.6 oz of alcohol per week. She reports current drug use. Drugs: Marijuana and Inhaled.    CURRENT MEDICATIONS  Previous Medications    PREDNISONE (DELTASONE) 10 MG TAB    Take 4 Tablets by mouth every day for 7 days, THEN 3 Tablets every day for 7 days, THEN 2 Tablets every day for 7 days, THEN 1 Tablet every day for 7 days, THEN 0.5 Tablets every day for 7 days. (Please call pharmacy for refills and continuation of therapy)       ALLERGIES  Allergies   Allergen Reactions    Apple Photosensitivity and Unspecified     Causes migraines and fainting. Large quantity causes seizures made worse by exposure to light    Punica Hives and Rash    Latex     Sulfa Drugs Hives     Pt states \"I don't know " "what happens when I have it, my mom just always told me I was allergic to it and I remember being hospitalized for it when I was a kid\"        PHYSICAL EXAM  BP (!) 133/96   Pulse (!) 122   Temp 37.9 °C (100.3 °F) (Oral)   Resp 18   Ht 1.626 m (5' 4\")   Wt (!) 128 kg (282 lb)   LMP 09/05/2023 (Approximate)   SpO2 97%   BMI 48.41 kg/m²      Nursing note and vitals reviewed.  Constitutional: Well-developed and well-nourished. Mild distress. Tactile fever  HENT: Head is normocephalic and atraumatic. Right cervical adenopathy with tenderness, Pharyngeal erythema, Uvula midline, No peritonsillar abscess.  Eyes: Pupils are equal, round, and reactive to light. Conjunctiva are normal.   Cardiovascular: Tachycardic rate and regular rhythm. No murmur heard. Normal radial pulses.  Pulmonary/Chest: Breath sounds normal. No wheezes or rales.   Abdominal: Soft and non-tender. No distention    Musculoskeletal: Extremities exhibit normal range of motion without edema or tenderness.   Neurological: Awake, alert and oriented to person, place, and time. No focal deficits noted.  Skin: Skin is warm and dry. No rash.   Psychiatric: Normal mood and affect. Appropriate for clinical situation    DIAGNOSTIC STUDIES    Labs:   Results for orders placed or performed during the hospital encounter of 10/12/23   Group A Strep by PCR    Specimen: Throat   Result Value Ref Range    Group A Strep by PCR DETECTED (A) Not Detected   MONONUCLEOSIS TEST QUAL   Result Value Ref Range    Heterophile Screen Negative Negative   CBC WITH DIFFERENTIAL   Result Value Ref Range    WBC 16.7 (H) 4.8 - 10.8 K/uL    RBC 4.64 4.20 - 5.40 M/uL    Hemoglobin 12.5 12.0 - 16.0 g/dL    Hematocrit 37.7 37.0 - 47.0 %    MCV 81.3 (L) 81.4 - 97.8 fL    MCH 26.9 (L) 27.0 - 33.0 pg    MCHC 33.2 32.2 - 35.5 g/dL    RDW 43.1 35.9 - 50.0 fL    Platelet Count 421 164 - 446 K/uL    MPV 10.2 9.0 - 12.9 fL    Neutrophils-Polys 74.40 (H) 44.00 - 72.00 %    Lymphocytes 15.90 " (L) 22.00 - 41.00 %    Monocytes 7.70 0.00 - 13.40 %    Eosinophils 0.80 0.00 - 6.90 %    Basophils 0.50 0.00 - 1.80 %    Immature Granulocytes 0.70 0.00 - 0.90 %    Nucleated RBC 0.00 0.00 - 0.20 /100 WBC    Neutrophils (Absolute) 12.42 (H) 1.82 - 7.42 K/uL    Lymphs (Absolute) 2.65 1.00 - 4.80 K/uL    Monos (Absolute) 1.29 (H) 0.00 - 0.85 K/uL    Eos (Absolute) 0.14 0.00 - 0.51 K/uL    Baso (Absolute) 0.09 0.00 - 0.12 K/uL    Immature Granulocytes (abs) 0.12 (H) 0.00 - 0.11 K/uL    NRBC (Absolute) 0.00 K/uL   BASIC METABOLIC PANEL   Result Value Ref Range    Sodium 136 135 - 145 mmol/L    Potassium 3.9 3.6 - 5.5 mmol/L    Chloride 103 96 - 112 mmol/L    Co2 23 20 - 33 mmol/L    Glucose 136 (H) 65 - 99 mg/dL    Bun 7 (L) 8 - 22 mg/dL    Creatinine 0.68 0.50 - 1.40 mg/dL    Calcium 8.3 (L) 8.5 - 10.5 mg/dL    Anion Gap 10.0 7.0 - 16.0   BETA-HCG QUALITATIVE SERUM   Result Value Ref Range    Beta-Hcg Qualitative Serum Negative Negative   ESTIMATED GFR   Result Value Ref Range    GFR (CKD-EPI) 127 >60 mL/min/1.73 m 2   EKG (NOW)   Result Value Ref Range    Report       [facility] Emergency Dept.    Test Date:  2023-10-12  Pt Name:    IDALMIS NELSON              Department: ER  MRN:        [id number]                      Room:       [facility]  Gender:     Female                       Technician: [id number]  :        2000                   Requested By:KIMBERLY HART  Order #:    [id number]                    Candelario MD:    Measurements  Intervals                                Axis  Rate:       113                          P:          19  MS:         131                          QRS:        -28  QRSD:       87                           T:          15  QT:         331  QTc:        454    Interpretive Statements  Sinus tachycardia  Borderline left axis deviation  Compared to ECG 09/10/2022 20:05:46  No significant changes     POC CoV-2, FLU A/B, RSV by PCR   Result Value Ref Range    POC Influenza A  RNA, PCR Negative Negative    POC Influenza B RNA, PCR Negative Negative    POC RSV, by PCR Negative Negative    POC SARS-CoV-2, PCR NotDetected        EKG:   I have independently interpreted this EKG as detailed above.     INITIAL ASSESSMENT AND PLAN    7:44 AM - Patient was evaluated at bedside for right neck swelling. Ordered for EKG, CBC with diff, BMP, Beta-HCG Qual, Group A Strep, Mono, SARS-CoV-2, Flu A/B, and RSV to evaluate. The patient will be medicated with Decadron 12 mg and Cepacol 1 lozenge for her symptoms. Patient verbalizes understanding and support with my plan of care.  Differential diagnoses include but not limited to: Strep throat, COVID, Viral URI.    ED Observation Status? Yes; I am placing the patient in to an observation status due to a diagnostic uncertainty as well as therapeutic intensity. Patient placed in observation status at 8:19 AM, 10/12/2023.     Observation plan is as follows: The patient will be observed pending labs, EKG, treatment of her symptoms and fluids.    Upon Reevaluation, the patient's condition has: Improved; and will be discharged.    Patient discharged from ED Observation status at 10:12 AM (Time) 10/12/2023 (Date).      COURSE AND MEDICAL DECISION MAKING    8:00 AM - The patient will be treated with Zofran 4 mg and Toradol 30 mg.    10:12 AM - The patient is positive for Strep Throat. She will be treated with Rocephin 2,000 mg here and discharged home with Amoxicillin. She was updated on diagnostic result sand the plan of care. She was instructed to return for any new or worsening symptoms. Patient verbalizes understanding and agreement to this plan of care.       HYDRATION: Based on the patient's presentation of Tachycardia the patient was given IV fluids. IV Hydration was used because oral hydration was not adequate alone. Upon recheck following hydration, the patient was improved.    The patient was treated with Zofran for nausea.  Placed on a cardiac monitor to  monitor for any arrhythmia associated with Zofran and QT prolongation.    DISPOSITION AND DISCUSSIONS    I have discussed management of the patient with the following physicians and JOSEPH's:  None    Discussion of management with other QHP or appropriate source(s): None     Escalation of care considered, and ultimately not performed: acute inpatient care management, however at this time, the patient is most appropriate for outpatient management.    Barriers to care at this time, including but not limited to:  None noted .     Decision tools and prescription drugs considered including, but not limited to: Pain Medications I considered prescribing narcotic pain medication, however I feel pain should be adequately controlled with over the counter NSAIDs.    The patient will return for new or worsening symptoms and is stable at the time of discharge.    DISPOSITION:  Patient will be discharged home in stable condition.    FOLLOW UP:  Zully Medina M.D.  [address] [facility] [id number] [phone]    Schedule an appointment as soon as possible for a visit       [facility], Emergency Dept  [address] [facility] [id number] [phone]    If symptoms worsen      OUTPATIENT MEDICATIONS:  New Prescriptions    AMOXICILLIN (AMOXIL) 500 MG CAP    Take 2 Capsules by mouth every day for 10 days.       FINAL DIAGNOSIS  1. Strep throat    2. Sore throat    3. Cervical adenopathy         Lorena GRIMES (Benedict), am scribing for, and in the presence of, Kamran Calderon M.D..    Electronically signed by: oLrena Plata), 10/12/2023    Kamran GRIMES M.D. personally performed the services described in this documentation, as scribed by Lorena Langford in my presence, and it is both accurate and complete.      The note accurately reflects work and decisions made by me.  Kamrna Calderon M.D.  10/12/2023  1:30 PM

## 2023-10-12 NOTE — ED NOTES
Orthostatic:   Supine:  Pulse:111  BP: 129/58    Sitting:   BP:125/67   Pulse: 117      Standing:   BP: 118/64  Pulse: 129

## 2023-10-12 NOTE — ED NOTES
PT WHEELED TO ROOM. PT STATES SHE AWOKE TODAY AND WAS FEELING BAD AND HAVING TOTAL BODY PAIN. PT STATES SHE FELT FINE YESTERDAY. PATIENT ALSO STATES HEADACHE.     PT IS RESTING IN BED. BREATHING IS EVEN AND UNLABORED, SKIN IS WARM AND DRY, VSS, NAD, WILL CONTINUE TO MONITOR. PT IS [facility]AV[facility] EVALUATION.

## 2023-10-21 ENCOUNTER — HOSPITAL ENCOUNTER (EMERGENCY)
Facility: MEDICAL CENTER | Age: 23
End: 2023-10-21
Attending: STUDENT IN AN ORGANIZED HEALTH CARE EDUCATION/TRAINING PROGRAM
Payer: MEDICAID

## 2023-10-21 VITALS
RESPIRATION RATE: 14 BRPM | HEIGHT: 64 IN | OXYGEN SATURATION: 96 % | TEMPERATURE: 97.5 F | SYSTOLIC BLOOD PRESSURE: 123 MMHG | HEART RATE: 91 BPM | BODY MASS INDEX: 48.18 KG/M2 | WEIGHT: 282.19 LBS | DIASTOLIC BLOOD PRESSURE: 73 MMHG

## 2023-10-21 DIAGNOSIS — R10.13 EPIGASTRIC ABDOMINAL PAIN: ICD-10-CM

## 2023-10-21 DIAGNOSIS — R11.2 NAUSEA AND VOMITING, UNSPECIFIED VOMITING TYPE: ICD-10-CM

## 2023-10-21 LAB
ABO GROUP BLD: NORMAL
ALBUMIN SERPL BCP-MCNC: 4 G/DL (ref 3.2–4.9)
ALBUMIN/GLOB SERPL: 1.1 G/DL
ALP SERPL-CCNC: 63 U/L (ref 30–99)
ALT SERPL-CCNC: 30 U/L (ref 2–50)
ANION GAP SERPL CALC-SCNC: 10 MMOL/L (ref 7–16)
AST SERPL-CCNC: 17 U/L (ref 12–45)
BASOPHILS # BLD AUTO: 0.3 % (ref 0–1.8)
BASOPHILS # BLD: 0.04 K/UL (ref 0–0.12)
BILIRUB SERPL-MCNC: 0.3 MG/DL (ref 0.1–1.5)
BLD GP AB SCN SERPL QL: NORMAL
BUN SERPL-MCNC: 15 MG/DL (ref 8–22)
CALCIUM ALBUM COR SERPL-MCNC: 9.6 MG/DL (ref 8.5–10.5)
CALCIUM SERPL-MCNC: 9.6 MG/DL (ref 8.5–10.5)
CHLORIDE SERPL-SCNC: 105 MMOL/L (ref 96–112)
CO2 SERPL-SCNC: 23 MMOL/L (ref 20–33)
CREAT SERPL-MCNC: 0.87 MG/DL (ref 0.5–1.4)
EOSINOPHIL # BLD AUTO: 0.02 K/UL (ref 0–0.51)
EOSINOPHIL NFR BLD: 0.1 % (ref 0–6.9)
ERYTHROCYTE [DISTWIDTH] IN BLOOD BY AUTOMATED COUNT: 44.3 FL (ref 35.9–50)
GFR SERPLBLD CREATININE-BSD FMLA CKD-EPI: 97 ML/MIN/1.73 M 2
GLOBULIN SER CALC-MCNC: 3.8 G/DL (ref 1.9–3.5)
GLUCOSE SERPL-MCNC: 121 MG/DL (ref 65–99)
HCG SERPL QL: NEGATIVE
HCT VFR BLD AUTO: 41 % (ref 37–47)
HGB BLD-MCNC: 13.7 G/DL (ref 12–16)
IMM GRANULOCYTES # BLD AUTO: 0.06 K/UL (ref 0–0.11)
IMM GRANULOCYTES NFR BLD AUTO: 0.4 % (ref 0–0.9)
LIPASE SERPL-CCNC: 42 U/L (ref 11–82)
LYMPHOCYTES # BLD AUTO: 2.45 K/UL (ref 1–4.8)
LYMPHOCYTES NFR BLD: 17.2 % (ref 22–41)
MCH RBC QN AUTO: 27.2 PG (ref 27–33)
MCHC RBC AUTO-ENTMCNC: 33.4 G/DL (ref 32.2–35.5)
MCV RBC AUTO: 81.5 FL (ref 81.4–97.8)
MONOCYTES # BLD AUTO: 0.67 K/UL (ref 0–0.85)
MONOCYTES NFR BLD AUTO: 4.7 % (ref 0–13.4)
NEUTROPHILS # BLD AUTO: 11.02 K/UL (ref 1.82–7.42)
NEUTROPHILS NFR BLD: 77.3 % (ref 44–72)
NRBC # BLD AUTO: 0 K/UL
NRBC BLD-RTO: 0 /100 WBC (ref 0–0.2)
PLATELET # BLD AUTO: 400 K/UL (ref 164–446)
PMV BLD AUTO: 10.8 FL (ref 9–12.9)
POTASSIUM SERPL-SCNC: 4.1 MMOL/L (ref 3.6–5.5)
PROT SERPL-MCNC: 7.8 G/DL (ref 6–8.2)
RBC # BLD AUTO: 5.03 M/UL (ref 4.2–5.4)
RH BLD: NORMAL
SODIUM SERPL-SCNC: 138 MMOL/L (ref 135–145)
WBC # BLD AUTO: 14.3 K/UL (ref 4.8–10.8)

## 2023-10-21 PROCEDURE — 96375 TX/PRO/DX INJ NEW DRUG ADDON: CPT

## 2023-10-21 PROCEDURE — 86850 RBC ANTIBODY SCREEN: CPT

## 2023-10-21 PROCEDURE — 86900 BLOOD TYPING SEROLOGIC ABO: CPT

## 2023-10-21 PROCEDURE — 86901 BLOOD TYPING SEROLOGIC RH(D): CPT

## 2023-10-21 PROCEDURE — C9113 INJ PANTOPRAZOLE SODIUM, VIA: HCPCS | Mod: UD | Performed by: STUDENT IN AN ORGANIZED HEALTH CARE EDUCATION/TRAINING PROGRAM

## 2023-10-21 PROCEDURE — A9270 NON-COVERED ITEM OR SERVICE: HCPCS | Mod: UD | Performed by: STUDENT IN AN ORGANIZED HEALTH CARE EDUCATION/TRAINING PROGRAM

## 2023-10-21 PROCEDURE — 700105 HCHG RX REV CODE 258: Mod: UD | Performed by: STUDENT IN AN ORGANIZED HEALTH CARE EDUCATION/TRAINING PROGRAM

## 2023-10-21 PROCEDURE — 36415 COLL VENOUS BLD VENIPUNCTURE: CPT

## 2023-10-21 PROCEDURE — 80053 COMPREHEN METABOLIC PANEL: CPT

## 2023-10-21 PROCEDURE — 700111 HCHG RX REV CODE 636 W/ 250 OVERRIDE (IP): Mod: JZ,UD | Performed by: STUDENT IN AN ORGANIZED HEALTH CARE EDUCATION/TRAINING PROGRAM

## 2023-10-21 PROCEDURE — 83690 ASSAY OF LIPASE: CPT

## 2023-10-21 PROCEDURE — 96374 THER/PROPH/DIAG INJ IV PUSH: CPT

## 2023-10-21 PROCEDURE — 85025 COMPLETE CBC W/AUTO DIFF WBC: CPT

## 2023-10-21 PROCEDURE — 99285 EMERGENCY DEPT VISIT HI MDM: CPT

## 2023-10-21 PROCEDURE — 84703 CHORIONIC GONADOTROPIN ASSAY: CPT

## 2023-10-21 PROCEDURE — 700102 HCHG RX REV CODE 250 W/ 637 OVERRIDE(OP): Mod: UD | Performed by: STUDENT IN AN ORGANIZED HEALTH CARE EDUCATION/TRAINING PROGRAM

## 2023-10-21 RX ORDER — OXYCODONE HYDROCHLORIDE AND ACETAMINOPHEN 5; 325 MG/1; MG/1
1 TABLET ORAL ONCE
Status: COMPLETED | OUTPATIENT
Start: 2023-10-21 | End: 2023-10-21

## 2023-10-21 RX ORDER — PROCHLORPERAZINE EDISYLATE 5 MG/ML
10 INJECTION INTRAMUSCULAR; INTRAVENOUS ONCE
Status: COMPLETED | OUTPATIENT
Start: 2023-10-21 | End: 2023-10-21

## 2023-10-21 RX ORDER — FAMOTIDINE 20 MG/1
20 TABLET, FILM COATED ORAL 2 TIMES DAILY PRN
Qty: 30 TABLET | Refills: 0 | Status: ON HOLD | OUTPATIENT
Start: 2023-10-21 | End: 2023-10-24

## 2023-10-21 RX ORDER — MORPHINE SULFATE 4 MG/ML
4 INJECTION INTRAVENOUS ONCE
Status: COMPLETED | OUTPATIENT
Start: 2023-10-21 | End: 2023-10-21

## 2023-10-21 RX ORDER — ONDANSETRON 2 MG/ML
8 INJECTION INTRAMUSCULAR; INTRAVENOUS ONCE
Status: COMPLETED | OUTPATIENT
Start: 2023-10-21 | End: 2023-10-21

## 2023-10-21 RX ORDER — PANTOPRAZOLE SODIUM 20 MG/1
20 TABLET, DELAYED RELEASE ORAL DAILY
Qty: 30 TABLET | Refills: 0 | Status: ON HOLD | OUTPATIENT
Start: 2023-10-21 | End: 2023-10-24

## 2023-10-21 RX ORDER — PANTOPRAZOLE SODIUM 40 MG/10ML
40 INJECTION, POWDER, LYOPHILIZED, FOR SOLUTION INTRAVENOUS ONCE
Status: COMPLETED | OUTPATIENT
Start: 2023-10-21 | End: 2023-10-21

## 2023-10-21 RX ORDER — SODIUM CHLORIDE 9 MG/ML
INJECTION, SOLUTION INTRAVENOUS ONCE
Status: COMPLETED | OUTPATIENT
Start: 2023-10-21 | End: 2023-10-21

## 2023-10-21 RX ORDER — ONDANSETRON 4 MG/1
4 TABLET, ORALLY DISINTEGRATING ORAL EVERY 6 HOURS PRN
Qty: 10 TABLET | Refills: 0 | Status: SHIPPED | OUTPATIENT
Start: 2023-10-21 | End: 2023-11-23 | Stop reason: SDUPTHER

## 2023-10-21 RX ADMIN — PROCHLORPERAZINE EDISYLATE 10 MG: 5 INJECTION INTRAMUSCULAR; INTRAVENOUS at 22:46

## 2023-10-21 RX ADMIN — MORPHINE SULFATE 4 MG: 4 INJECTION, SOLUTION INTRAMUSCULAR; INTRAVENOUS at 21:20

## 2023-10-21 RX ADMIN — PANTOPRAZOLE SODIUM 40 MG: 40 INJECTION, POWDER, FOR SOLUTION INTRAVENOUS at 21:21

## 2023-10-21 RX ADMIN — ONDANSETRON 8 MG: 2 INJECTION INTRAMUSCULAR; INTRAVENOUS at 21:20

## 2023-10-21 RX ADMIN — OXYCODONE AND ACETAMINOPHEN 1 TABLET: 5; 325 TABLET ORAL at 22:45

## 2023-10-21 RX ADMIN — SODIUM CHLORIDE: 9 INJECTION, SOLUTION INTRAVENOUS at 21:15

## 2023-10-21 RX ADMIN — FAMOTIDINE 20 MG: 10 INJECTION, SOLUTION INTRAVENOUS at 21:21

## 2023-10-21 RX ADMIN — LIDOCAINE HYDROCHLORIDE 15 ML: 20 SOLUTION OROPHARYNGEAL at 23:01

## 2023-10-21 ASSESSMENT — PAIN DESCRIPTION - PAIN TYPE
TYPE: ACUTE PAIN

## 2023-10-21 ASSESSMENT — FIBROSIS 4 INDEX: FIB4 SCORE: 0.15

## 2023-10-22 ENCOUNTER — PHARMACY VISIT (OUTPATIENT)
Dept: PHARMACY | Facility: MEDICAL CENTER | Age: 23
End: 2023-10-22
Payer: COMMERCIAL

## 2023-10-22 PROCEDURE — RXMED WILLOW AMBULATORY MEDICATION CHARGE: Performed by: STUDENT IN AN ORGANIZED HEALTH CARE EDUCATION/TRAINING PROGRAM

## 2023-10-22 NOTE — ED PROVIDER NOTES
ED Provider Note    CHIEF COMPLAINT  Chief Complaint   Patient presents with    N/V     Pt reports starting around 1600 she began having bouts of emesis. Per pt starting 30 min ago she had hematemesis.        EXTERNAL RECORDS REVIEWED  Inpatient Notes discharge summary on 9/28/2023 for hematemesis and abdominal pain    HPI/ROS  LIMITATION TO HISTORY   Select: : None  OUTSIDE HISTORIAN(S):      Katie Graham is a 23 y.o. female who presents with nausea, vomiting, hematemesis, epigastric abdominal pain.  Patient recently completed a course of antibiotics for strep pharyngitis.  Patient has been on prednisone for Crohn's as well.  Patient has a recent diagnosis of Crohn's disease following colonoscopy, biopsy and GI consultation.  Patient endorses feeling fatigued and nauseous and lightheaded still.  Patient denies dysuria or hematuria.    PAST MEDICAL HISTORY   has a past medical history of Allergy, Anxiety, Borderline personality disorder (HCC), Depression, Family history of diabetes mellitus (11/04/2020), GERD (gastroesophageal reflux disease), Head ache, Mast cell activation syndrome ([facility]), Migraine, POTS (postural orthostatic tachycardia syndrome), Substance abuse ([facility]), and Suicidal ideation (9/24/2021).    SURGICAL HISTORY   has a past surgical history that includes laparoscopic umbilical hernia repair (2002); colonoscopy,diagnostic (N/A, 9/28/2023); and colonoscopy,biopsy (N/A, 9/28/2023).    FAMILY HISTORY  Family History   Problem Relation Age of Onset    Cancer Mother 54        bone cancer with mets    Diabetes Mother 30        Type 2    Alcohol abuse Mother         sober 18 months    Hypertension Father     Alcohol abuse Father     Kidney Disease Father     Diabetes Sister         type 2    Genetic Disorder Sister         club foot    No Known Problems Sister     No Known Problems Brother     Lung Disease Maternal Aunt     Alcohol abuse Maternal Aunt     Cancer Maternal Grandmother         Lung    Alcohol  "abuse Maternal Grandmother     Diabetes Maternal Grandfather         type 2    Heart Disease Maternal Grandfather     Hypertension Maternal Grandfather     Hyperlipidemia Maternal Grandfather     Alcohol abuse Maternal Grandfather     Heart Disease Paternal Grandmother     Hypertension Paternal Grandmother     Hyperlipidemia Paternal Grandmother     Alcohol abuse Paternal Grandfather     Cancer Maternal great-grandmother         breast       SOCIAL HISTORY  Social History     Tobacco Use    Smoking status: Former     Current packs/day: 0.00     Average packs/day: 0.1 packs/day for 0.2 years     Types: Cigarettes     Start date: 2018     Quit date: 2018     Years since quittin.9    Smokeless tobacco: Never    Tobacco comments:     Quit after 3 months   Vaping Use    Vaping Use: Former    Substances: Nicotine, Flavoring, 0 anali     Devices: Refillable tank   Substance and Sexual Activity    Alcohol use: Not Currently     Alcohol/week: 3.6 oz     Types: 6 Shots of liquor per week     Comment: none since 25    Drug use: Yes     Types: Marijuana, Inhaled     Comment: past hx of using multiple drugs in high school    Sexual activity: Not Currently     Partners: Female, Male     Birth control/protection: Condom Male       CURRENT MEDICATIONS  Home Medications    **Home medications have not yet been reviewed for this encounter**         ALLERGIES  Allergies   Allergen Reactions    Apple Photosensitivity and Unspecified     Causes migraines and fainting. Large quantity causes seizures made worse by exposure to light    Punica Hives and Rash    Latex     Sulfa Drugs Hives     Pt states \"I don't know what happens when I have it, my mom just always told me I was allergic to it and I remember being hospitalized for it when I was a kid\"       PHYSICAL EXAM  VITAL SIGNS: BP (!) 164/74   Pulse (!) 108   Temp 36.4 °C (97.6 °F) (Temporal)   Resp 16   Ht 1.626 m (5' 4\")   Wt (!) 128 kg (282 lb 3 oz)   LMP " 10/18/2023 (Approximate)   SpO2 96%   BMI 48.44 kg/m²    Vitals and nursing note reviewed.   Constitutional:       Comments: Patient is lying in bed supine, pleasant, conversant, speaking in complete sentences   HENT:      Head: Normocephalic and atraumatic.   Dry mucous membranes  Eyes:      Extraocular Movements: Extraocular movements intact.      Conjunctiva/sclera: Conjunctivae normal.      Pupils: Pupils are equal, round, and reactive to light.   Cardiovascular:      Pulses: Normal pulses.      Comments:   Pulmonary:      Effort: Pulmonary effort is normal. No respiratory distress.   Abdominal:      Comments: Abdomen is soft, epigastric tenderness to palpation without rigidity rebound or guarding  Musculoskeletal:         General: No swelling. Normal range of motion.      Cervical back: Normal range of motion. No rigidity.   Skin:     General: Skin is warm and dry.      Capillary Refill: Capillary refill takes less than 2 seconds.   Neurological:      Mental Status: Alert.       DIAGNOSTIC STUDIES / PROCEDURES      LABS  Mild leukocytosis to 14.3      COURSE & MEDICAL DECISION MAKING      INITIAL ASSESSMENT, COURSE AND PLAN  Care Narrative: CBC to evaluate for acute anemia and leukocytosis.  CMP to evaluate for acute electrolyte abnormality, acute kidney injury, acute liver failure or dysfunction.  IV fluids and Zofran for decreased oral intake, dry mucous membranes and nausea tachycardia.  Protonix, famotidine, morphine for symptom control.  Lipase to rule out pancreatitis.  Patient will be observed for repeat episodes of hematemesis.  hCG to evaluate for IUP versus ectopic.    Electronically signed by: Eugenio Kay M.D., 10/21/2023 9:12 PM    Mild leukocytosis likely secondary to vomiting.  Patient has no right upper quadrant tenderness, no right lower quadrant tenderness, no left lower quadrant tenderness to palpation, no evidence of cholecystitis, diverticulitis, appendicitis or other acute  intra-abdominal infection requiring CT imaging at this time.  Lipase negative, pancreatitis inconsistent with patient presentation at this time.  Patient reports feeling much better at this time, tolerating oral intake.  Patient will be discharged with antacids, pantoprazole and famotidine and Zofran for nausea control.  I do believe patient has some erosive gastritis secondary to recent antibiotic and continuous prednisone use.    Patient has been given strict return precautions for any worsening symptoms.  Based on current vital signs patient is low risk for upper GI bleed per Glascow Berg score.    Repeat physical exam benign.  I doubt any serious emergency process at this time.  Patient and/or family, friends given strict return precautions for worsening symptoms and care instructions. They have demonstrated understanding of discharge instructions through teach back mechanism. Advised PCP follow-up in 1-2 days.  Patient/family/friend expresses understanding and agrees to plan.    This dictation has been created using voice recognition software. I am continuously working with the software to minimize the number of voice recognition errors and I have made every attempt to manually correct the errors within my dictation. However errors  related to this voice recognition software may still exist and should be interpreted within the appropriate context.     Electronically signed by: Eugenoi Kay M.D., 10/21/2023 11:28 PM      HYDRATION: Based on the patient's presentation of Dehydration the patient was given IV fluids. IV Hydration was used because oral hydration was not adequate alone. Upon recheck following hydration, the patient was improved.        DISPOSITION AND DISCUSSIONS    Escalation of care considered, and ultimately not performed:diagnostic imaging and acute inpatient care management, however at this time, the patient is most appropriate for outpatient management      FINAL DIAGNOSIS  1.  Nausea and vomiting, unspecified vomiting type    2. Epigastric abdominal pain           Electronically signed by: Eugenio Kay M.D., 10/21/2023 9:08 PM

## 2023-10-22 NOTE — ED TRIAGE NOTES
"Chief Complaint   Patient presents with    N/V     Pt reports starting around 1600 she began having bouts of emesis. Per pt starting 30 min ago she had hematemesis.      BP (!) 164/74   Pulse (!) 108   Temp 36.4 °C (97.6 °F) (Temporal)   Resp 16   Ht 1.626 m (5' 4\")   Wt (!) 128 kg (282 lb 3 oz)   LMP 10/18/2023 (Approximate)   SpO2 96%   BMI 48.44 kg/m²     Pt reports recent dx of chron's disease. Pt is alert and orientated and appropriate at this time   "

## 2023-10-23 ENCOUNTER — APPOINTMENT (OUTPATIENT)
Dept: RADIOLOGY | Facility: MEDICAL CENTER | Age: 23
End: 2023-10-23
Attending: STUDENT IN AN ORGANIZED HEALTH CARE EDUCATION/TRAINING PROGRAM
Payer: MEDICAID

## 2023-10-23 ENCOUNTER — HOSPITAL ENCOUNTER (OUTPATIENT)
Facility: MEDICAL CENTER | Age: 23
End: 2023-10-24
Attending: STUDENT IN AN ORGANIZED HEALTH CARE EDUCATION/TRAINING PROGRAM | Admitting: INTERNAL MEDICINE
Payer: MEDICAID

## 2023-10-23 ENCOUNTER — APPOINTMENT (OUTPATIENT)
Dept: RADIOLOGY | Facility: MEDICAL CENTER | Age: 23
End: 2023-10-23
Payer: MEDICAID

## 2023-10-23 DIAGNOSIS — K92.2 UGIB (UPPER GASTROINTESTINAL BLEED): ICD-10-CM

## 2023-10-23 DIAGNOSIS — K92.0 HEMATEMESIS WITH NAUSEA: ICD-10-CM

## 2023-10-23 DIAGNOSIS — R10.13 EPIGASTRIC PAIN: ICD-10-CM

## 2023-10-23 LAB
ABO GROUP BLD: NORMAL
ALBUMIN SERPL BCP-MCNC: 4.1 G/DL (ref 3.2–4.9)
ALBUMIN/GLOB SERPL: 1.1 G/DL
ALP SERPL-CCNC: 57 U/L (ref 30–99)
ALT SERPL-CCNC: 31 U/L (ref 2–50)
ANION GAP SERPL CALC-SCNC: 11 MMOL/L (ref 7–16)
APTT PPP: 26.4 SEC (ref 24.7–36)
AST SERPL-CCNC: 14 U/L (ref 12–45)
BASOPHILS # BLD AUTO: 0.4 % (ref 0–1.8)
BASOPHILS # BLD: 0.05 K/UL (ref 0–0.12)
BILIRUB SERPL-MCNC: 0.3 MG/DL (ref 0.1–1.5)
BLD GP AB SCN SERPL QL: NORMAL
BUN SERPL-MCNC: 12 MG/DL (ref 8–22)
CALCIUM ALBUM COR SERPL-MCNC: 8.9 MG/DL (ref 8.5–10.5)
CALCIUM SERPL-MCNC: 9 MG/DL (ref 8.5–10.5)
CHLORIDE SERPL-SCNC: 106 MMOL/L (ref 96–112)
CO2 SERPL-SCNC: 21 MMOL/L (ref 20–33)
CREAT SERPL-MCNC: 0.68 MG/DL (ref 0.5–1.4)
EOSINOPHIL # BLD AUTO: 0.03 K/UL (ref 0–0.51)
EOSINOPHIL NFR BLD: 0.3 % (ref 0–6.9)
ERYTHROCYTE [DISTWIDTH] IN BLOOD BY AUTOMATED COUNT: 45.1 FL (ref 35.9–50)
GFR SERPLBLD CREATININE-BSD FMLA CKD-EPI: 127 ML/MIN/1.73 M 2
GLOBULIN SER CALC-MCNC: 3.7 G/DL (ref 1.9–3.5)
GLUCOSE SERPL-MCNC: 103 MG/DL (ref 65–99)
HCG SERPL QL: NEGATIVE
HCT VFR BLD AUTO: 38.8 % (ref 37–47)
HGB BLD-MCNC: 12.8 G/DL (ref 12–16)
IMM GRANULOCYTES # BLD AUTO: 0.04 K/UL (ref 0–0.11)
IMM GRANULOCYTES NFR BLD AUTO: 0.3 % (ref 0–0.9)
INR PPP: 1.1 (ref 0.87–1.13)
LIPASE SERPL-CCNC: 35 U/L (ref 11–82)
LYMPHOCYTES # BLD AUTO: 3.05 K/UL (ref 1–4.8)
LYMPHOCYTES NFR BLD: 26.1 % (ref 22–41)
MCH RBC QN AUTO: 27.2 PG (ref 27–33)
MCHC RBC AUTO-ENTMCNC: 33 G/DL (ref 32.2–35.5)
MCV RBC AUTO: 82.6 FL (ref 81.4–97.8)
MONOCYTES # BLD AUTO: 0.68 K/UL (ref 0–0.85)
MONOCYTES NFR BLD AUTO: 5.8 % (ref 0–13.4)
NEUTROPHILS # BLD AUTO: 7.82 K/UL (ref 1.82–7.42)
NEUTROPHILS NFR BLD: 67.1 % (ref 44–72)
NRBC # BLD AUTO: 0 K/UL
NRBC BLD-RTO: 0 /100 WBC (ref 0–0.2)
PLATELET # BLD AUTO: 355 K/UL (ref 164–446)
PMV BLD AUTO: 10.2 FL (ref 9–12.9)
POTASSIUM SERPL-SCNC: 4 MMOL/L (ref 3.6–5.5)
PROT SERPL-MCNC: 7.8 G/DL (ref 6–8.2)
PROTHROMBIN TIME: 14.4 SEC (ref 12–14.6)
RBC # BLD AUTO: 4.7 M/UL (ref 4.2–5.4)
RH BLD: NORMAL
SODIUM SERPL-SCNC: 138 MMOL/L (ref 135–145)
WBC # BLD AUTO: 11.7 K/UL (ref 4.8–10.8)

## 2023-10-23 PROCEDURE — 86900 BLOOD TYPING SEROLOGIC ABO: CPT

## 2023-10-23 PROCEDURE — G0378 HOSPITAL OBSERVATION PER HR: HCPCS

## 2023-10-23 PROCEDURE — 80053 COMPREHEN METABOLIC PANEL: CPT | Mod: 91

## 2023-10-23 PROCEDURE — 71045 X-RAY EXAM CHEST 1 VIEW: CPT

## 2023-10-23 PROCEDURE — 96374 THER/PROPH/DIAG INJ IV PUSH: CPT

## 2023-10-23 PROCEDURE — 96375 TX/PRO/DX INJ NEW DRUG ADDON: CPT

## 2023-10-23 PROCEDURE — 700105 HCHG RX REV CODE 258: Mod: UD | Performed by: STUDENT IN AN ORGANIZED HEALTH CARE EDUCATION/TRAINING PROGRAM

## 2023-10-23 PROCEDURE — 99285 EMERGENCY DEPT VISIT HI MDM: CPT

## 2023-10-23 PROCEDURE — 83690 ASSAY OF LIPASE: CPT

## 2023-10-23 PROCEDURE — 700111 HCHG RX REV CODE 636 W/ 250 OVERRIDE (IP): Mod: JZ,UD | Performed by: STUDENT IN AN ORGANIZED HEALTH CARE EDUCATION/TRAINING PROGRAM

## 2023-10-23 PROCEDURE — 85730 THROMBOPLASTIN TIME PARTIAL: CPT

## 2023-10-23 PROCEDURE — 36415 COLL VENOUS BLD VENIPUNCTURE: CPT

## 2023-10-23 PROCEDURE — 86901 BLOOD TYPING SEROLOGIC RH(D): CPT

## 2023-10-23 PROCEDURE — 99222 1ST HOSP IP/OBS MODERATE 55: CPT | Performed by: INTERNAL MEDICINE

## 2023-10-23 PROCEDURE — 85025 COMPLETE CBC W/AUTO DIFF WBC: CPT | Mod: 91

## 2023-10-23 PROCEDURE — 85610 PROTHROMBIN TIME: CPT

## 2023-10-23 PROCEDURE — 84703 CHORIONIC GONADOTROPIN ASSAY: CPT

## 2023-10-23 PROCEDURE — 86850 RBC ANTIBODY SCREEN: CPT

## 2023-10-23 RX ORDER — ONDANSETRON 2 MG/ML
8 INJECTION INTRAMUSCULAR; INTRAVENOUS ONCE
Status: COMPLETED | OUTPATIENT
Start: 2023-10-23 | End: 2023-10-23

## 2023-10-23 RX ORDER — OXYCODONE HYDROCHLORIDE 10 MG/1
10 TABLET ORAL
Status: DISCONTINUED | OUTPATIENT
Start: 2023-10-23 | End: 2023-10-24 | Stop reason: HOSPADM

## 2023-10-23 RX ORDER — OXYCODONE HYDROCHLORIDE 5 MG/1
5 TABLET ORAL
Status: DISCONTINUED | OUTPATIENT
Start: 2023-10-23 | End: 2023-10-24 | Stop reason: HOSPADM

## 2023-10-23 RX ORDER — ACETAMINOPHEN 325 MG/1
650 TABLET ORAL EVERY 6 HOURS PRN
Status: DISCONTINUED | OUTPATIENT
Start: 2023-10-23 | End: 2023-10-24 | Stop reason: HOSPADM

## 2023-10-23 RX ORDER — AMOXICILLIN 250 MG
2 CAPSULE ORAL 2 TIMES DAILY
Status: DISCONTINUED | OUTPATIENT
Start: 2023-10-24 | End: 2023-10-24 | Stop reason: HOSPADM

## 2023-10-23 RX ORDER — PROMETHAZINE HYDROCHLORIDE 12.5 MG/1
12.5-25 SUPPOSITORY RECTAL EVERY 4 HOURS PRN
Status: DISCONTINUED | OUTPATIENT
Start: 2023-10-23 | End: 2023-10-24 | Stop reason: HOSPADM

## 2023-10-23 RX ORDER — PROCHLORPERAZINE EDISYLATE 5 MG/ML
5-10 INJECTION INTRAMUSCULAR; INTRAVENOUS EVERY 4 HOURS PRN
Status: DISCONTINUED | OUTPATIENT
Start: 2023-10-23 | End: 2023-10-24 | Stop reason: HOSPADM

## 2023-10-23 RX ORDER — SODIUM CHLORIDE 9 MG/ML
INJECTION, SOLUTION INTRAVENOUS ONCE
Status: COMPLETED | OUTPATIENT
Start: 2023-10-23 | End: 2023-10-23

## 2023-10-23 RX ORDER — PROMETHAZINE HYDROCHLORIDE 25 MG/1
12.5-25 TABLET ORAL EVERY 4 HOURS PRN
Status: DISCONTINUED | OUTPATIENT
Start: 2023-10-23 | End: 2023-10-24 | Stop reason: HOSPADM

## 2023-10-23 RX ORDER — LABETALOL HYDROCHLORIDE 5 MG/ML
10 INJECTION, SOLUTION INTRAVENOUS EVERY 4 HOURS PRN
Status: DISCONTINUED | OUTPATIENT
Start: 2023-10-23 | End: 2023-10-24 | Stop reason: HOSPADM

## 2023-10-23 RX ORDER — SODIUM CHLORIDE 9 MG/ML
INJECTION, SOLUTION INTRAVENOUS CONTINUOUS
Status: DISCONTINUED | OUTPATIENT
Start: 2023-10-24 | End: 2023-10-24 | Stop reason: HOSPADM

## 2023-10-23 RX ORDER — ONDANSETRON 2 MG/ML
4 INJECTION INTRAMUSCULAR; INTRAVENOUS EVERY 4 HOURS PRN
Status: DISCONTINUED | OUTPATIENT
Start: 2023-10-23 | End: 2023-10-24 | Stop reason: HOSPADM

## 2023-10-23 RX ORDER — POLYETHYLENE GLYCOL 3350 17 G/17G
1 POWDER, FOR SOLUTION ORAL
Status: DISCONTINUED | OUTPATIENT
Start: 2023-10-23 | End: 2023-10-24 | Stop reason: HOSPADM

## 2023-10-23 RX ORDER — PANTOPRAZOLE SODIUM 40 MG/10ML
40 INJECTION, POWDER, LYOPHILIZED, FOR SOLUTION INTRAVENOUS 2 TIMES DAILY
Status: DISCONTINUED | OUTPATIENT
Start: 2023-10-24 | End: 2023-10-24 | Stop reason: HOSPADM

## 2023-10-23 RX ORDER — ONDANSETRON 4 MG/1
4 TABLET, ORALLY DISINTEGRATING ORAL EVERY 4 HOURS PRN
Status: DISCONTINUED | OUTPATIENT
Start: 2023-10-23 | End: 2023-10-24 | Stop reason: HOSPADM

## 2023-10-23 RX ORDER — BISACODYL 10 MG
10 SUPPOSITORY, RECTAL RECTAL
Status: DISCONTINUED | OUTPATIENT
Start: 2023-10-23 | End: 2023-10-24 | Stop reason: HOSPADM

## 2023-10-23 RX ORDER — MORPHINE SULFATE 4 MG/ML
4 INJECTION INTRAVENOUS ONCE
Status: COMPLETED | OUTPATIENT
Start: 2023-10-23 | End: 2023-10-23

## 2023-10-23 RX ORDER — HYDROMORPHONE HYDROCHLORIDE 1 MG/ML
0.5 INJECTION, SOLUTION INTRAMUSCULAR; INTRAVENOUS; SUBCUTANEOUS
Status: DISCONTINUED | OUTPATIENT
Start: 2023-10-23 | End: 2023-10-24 | Stop reason: HOSPADM

## 2023-10-23 RX ADMIN — SODIUM CHLORIDE: 9 INJECTION, SOLUTION INTRAVENOUS at 21:28

## 2023-10-23 RX ADMIN — ONDANSETRON 8 MG: 2 INJECTION INTRAMUSCULAR; INTRAVENOUS at 21:24

## 2023-10-23 RX ADMIN — MORPHINE SULFATE 4 MG: 4 INJECTION, SOLUTION INTRAMUSCULAR; INTRAVENOUS at 21:23

## 2023-10-23 ASSESSMENT — PATIENT HEALTH QUESTIONNAIRE - PHQ9
SUM OF ALL RESPONSES TO PHQ9 QUESTIONS 1 AND 2: 0
2. FEELING DOWN, DEPRESSED, IRRITABLE, OR HOPELESS: NOT AT ALL
1. LITTLE INTEREST OR PLEASURE IN DOING THINGS: NOT AT ALL

## 2023-10-23 ASSESSMENT — FIBROSIS 4 INDEX
FIB4 SCORE: 0.15
FIB4 SCORE: 0.15

## 2023-10-23 ASSESSMENT — LIFESTYLE VARIABLES
TOTAL SCORE: 0
EVER FELT BAD OR GUILTY ABOUT YOUR DRINKING: NO
EVER HAD A DRINK FIRST THING IN THE MORNING TO STEADY YOUR NERVES TO GET RID OF A HANGOVER: NO
DOES PATIENT WANT TO STOP DRINKING: NO
CONSUMPTION TOTAL: NEGATIVE
HAVE PEOPLE ANNOYED YOU BY CRITICIZING YOUR DRINKING: NO
TOTAL SCORE: 0
AVERAGE NUMBER OF DAYS PER WEEK YOU HAVE A DRINK CONTAINING ALCOHOL: 2
ON A TYPICAL DAY WHEN YOU DRINK ALCOHOL HOW MANY DRINKS DO YOU HAVE: 3
TOTAL SCORE: 0
ALCOHOL_USE: YES
HAVE YOU EVER FELT YOU SHOULD CUT DOWN ON YOUR DRINKING: NO
HOW MANY TIMES IN THE PAST YEAR HAVE YOU HAD 5 OR MORE DRINKS IN A DAY: 0

## 2023-10-23 ASSESSMENT — PAIN DESCRIPTION - PAIN TYPE
TYPE: ACUTE PAIN

## 2023-10-24 ENCOUNTER — ANESTHESIA (OUTPATIENT)
Dept: SURGERY | Facility: MEDICAL CENTER | Age: 23
End: 2023-10-24
Payer: MEDICAID

## 2023-10-24 ENCOUNTER — ANESTHESIA EVENT (OUTPATIENT)
Dept: SURGERY | Facility: MEDICAL CENTER | Age: 23
End: 2023-10-24
Payer: MEDICAID

## 2023-10-24 VITALS
HEIGHT: 64 IN | SYSTOLIC BLOOD PRESSURE: 142 MMHG | HEART RATE: 79 BPM | DIASTOLIC BLOOD PRESSURE: 73 MMHG | WEIGHT: 273.15 LBS | OXYGEN SATURATION: 97 % | BODY MASS INDEX: 46.63 KG/M2 | RESPIRATION RATE: 18 BRPM | TEMPERATURE: 98.2 F

## 2023-10-24 PROBLEM — K92.2 UGIB (UPPER GASTROINTESTINAL BLEED): Status: ACTIVE | Noted: 2023-10-23

## 2023-10-24 PROBLEM — F17.200 TOBACCO DEPENDENCE: Status: ACTIVE | Noted: 2023-10-24

## 2023-10-24 LAB
ALBUMIN SERPL BCP-MCNC: 3.7 G/DL (ref 3.2–4.9)
ALBUMIN/GLOB SERPL: 1.1 G/DL
ALP SERPL-CCNC: 52 U/L (ref 30–99)
ALT SERPL-CCNC: 30 U/L (ref 2–50)
ANION GAP SERPL CALC-SCNC: 11 MMOL/L (ref 7–16)
AST SERPL-CCNC: 14 U/L (ref 12–45)
BASOPHILS # BLD AUTO: 0.5 % (ref 0–1.8)
BASOPHILS # BLD: 0.05 K/UL (ref 0–0.12)
BILIRUB SERPL-MCNC: 0.5 MG/DL (ref 0.1–1.5)
BUN SERPL-MCNC: 12 MG/DL (ref 8–22)
CALCIUM ALBUM COR SERPL-MCNC: 9.2 MG/DL (ref 8.5–10.5)
CALCIUM SERPL-MCNC: 9 MG/DL (ref 8.5–10.5)
CHLORIDE SERPL-SCNC: 104 MMOL/L (ref 96–112)
CO2 SERPL-SCNC: 25 MMOL/L (ref 20–33)
CREAT SERPL-MCNC: 0.85 MG/DL (ref 0.5–1.4)
EOSINOPHIL # BLD AUTO: 0.09 K/UL (ref 0–0.51)
EOSINOPHIL NFR BLD: 0.9 % (ref 0–6.9)
ERYTHROCYTE [DISTWIDTH] IN BLOOD BY AUTOMATED COUNT: 46.7 FL (ref 35.9–50)
GFR SERPLBLD CREATININE-BSD FMLA CKD-EPI: 100 ML/MIN/1.73 M 2
GLOBULIN SER CALC-MCNC: 3.5 G/DL (ref 1.9–3.5)
GLUCOSE SERPL-MCNC: 111 MG/DL (ref 65–99)
HCT VFR BLD AUTO: 39.2 % (ref 37–47)
HGB BLD-MCNC: 11.9 G/DL (ref 12–16)
HGB BLD-MCNC: 12.6 G/DL (ref 12–16)
IMM GRANULOCYTES # BLD AUTO: 0.03 K/UL (ref 0–0.11)
IMM GRANULOCYTES NFR BLD AUTO: 0.3 % (ref 0–0.9)
LYMPHOCYTES # BLD AUTO: 3.77 K/UL (ref 1–4.8)
LYMPHOCYTES NFR BLD: 36 % (ref 22–41)
MCH RBC QN AUTO: 27 PG (ref 27–33)
MCHC RBC AUTO-ENTMCNC: 32.1 G/DL (ref 32.2–35.5)
MCV RBC AUTO: 83.9 FL (ref 81.4–97.8)
MONOCYTES # BLD AUTO: 0.73 K/UL (ref 0–0.85)
MONOCYTES NFR BLD AUTO: 7 % (ref 0–13.4)
NEUTROPHILS # BLD AUTO: 5.8 K/UL (ref 1.82–7.42)
NEUTROPHILS NFR BLD: 55.3 % (ref 44–72)
NRBC # BLD AUTO: 0 K/UL
NRBC BLD-RTO: 0 /100 WBC (ref 0–0.2)
PATHOLOGY CONSULT NOTE: NORMAL
PLATELET # BLD AUTO: 339 K/UL (ref 164–446)
PMV BLD AUTO: 10.8 FL (ref 9–12.9)
POTASSIUM SERPL-SCNC: 4 MMOL/L (ref 3.6–5.5)
PROT SERPL-MCNC: 7.2 G/DL (ref 6–8.2)
RBC # BLD AUTO: 4.67 M/UL (ref 4.2–5.4)
SODIUM SERPL-SCNC: 140 MMOL/L (ref 135–145)
WBC # BLD AUTO: 10.5 K/UL (ref 4.8–10.8)

## 2023-10-24 PROCEDURE — 700111 HCHG RX REV CODE 636 W/ 250 OVERRIDE (IP): Mod: UD | Performed by: INTERNAL MEDICINE

## 2023-10-24 PROCEDURE — 160002 HCHG RECOVERY MINUTES (STAT): Performed by: INTERNAL MEDICINE

## 2023-10-24 PROCEDURE — 88312 SPECIAL STAINS GROUP 1: CPT

## 2023-10-24 PROCEDURE — 85018 HEMOGLOBIN: CPT

## 2023-10-24 PROCEDURE — 700105 HCHG RX REV CODE 258: Mod: UD | Performed by: ANESTHESIOLOGY

## 2023-10-24 PROCEDURE — C9113 INJ PANTOPRAZOLE SODIUM, VIA: HCPCS | Mod: UD | Performed by: INTERNAL MEDICINE

## 2023-10-24 PROCEDURE — 160203 HCHG ENDO MINUTES - 1ST 30 MINS LEVEL 4: Performed by: INTERNAL MEDICINE

## 2023-10-24 PROCEDURE — 160048 HCHG OR STATISTICAL LEVEL 1-5: Performed by: INTERNAL MEDICINE

## 2023-10-24 PROCEDURE — 96376 TX/PRO/DX INJ SAME DRUG ADON: CPT | Mod: XU

## 2023-10-24 PROCEDURE — 43239 EGD BIOPSY SINGLE/MULTIPLE: CPT | Performed by: INTERNAL MEDICINE

## 2023-10-24 PROCEDURE — 700105 HCHG RX REV CODE 258: Mod: UD | Performed by: INTERNAL MEDICINE

## 2023-10-24 PROCEDURE — 99239 HOSP IP/OBS DSCHRG MGMT >30: CPT | Performed by: INTERNAL MEDICINE

## 2023-10-24 PROCEDURE — 160035 HCHG PACU - 1ST 60 MINS PHASE I: Performed by: INTERNAL MEDICINE

## 2023-10-24 PROCEDURE — G0378 HOSPITAL OBSERVATION PER HR: HCPCS

## 2023-10-24 PROCEDURE — 96375 TX/PRO/DX INJ NEW DRUG ADDON: CPT | Mod: XU

## 2023-10-24 PROCEDURE — 160009 HCHG ANES TIME/MIN: Performed by: INTERNAL MEDICINE

## 2023-10-24 PROCEDURE — 99244 OFF/OP CNSLTJ NEW/EST MOD 40: CPT | Mod: 25,GC | Performed by: INTERNAL MEDICINE

## 2023-10-24 PROCEDURE — 700111 HCHG RX REV CODE 636 W/ 250 OVERRIDE (IP): Mod: UD | Performed by: ANESTHESIOLOGY

## 2023-10-24 PROCEDURE — 88305 TISSUE EXAM BY PATHOLOGIST: CPT

## 2023-10-24 RX ORDER — OMEPRAZOLE 20 MG/1
20 CAPSULE, DELAYED RELEASE ORAL DAILY
Qty: 30 CAPSULE | Refills: 0 | Status: SHIPPED | OUTPATIENT
Start: 2023-11-22 | End: 2023-12-18

## 2023-10-24 RX ORDER — HALOPERIDOL 5 MG/ML
1 INJECTION INTRAMUSCULAR
Status: DISCONTINUED | OUTPATIENT
Start: 2023-10-24 | End: 2023-10-24 | Stop reason: HOSPADM

## 2023-10-24 RX ORDER — ONDANSETRON 2 MG/ML
4 INJECTION INTRAMUSCULAR; INTRAVENOUS
Status: DISCONTINUED | OUTPATIENT
Start: 2023-10-24 | End: 2023-10-24 | Stop reason: HOSPADM

## 2023-10-24 RX ORDER — DIPHENHYDRAMINE HYDROCHLORIDE 50 MG/ML
12.5 INJECTION INTRAMUSCULAR; INTRAVENOUS
Status: DISCONTINUED | OUTPATIENT
Start: 2023-10-24 | End: 2023-10-24 | Stop reason: HOSPADM

## 2023-10-24 RX ORDER — SODIUM CHLORIDE, SODIUM LACTATE, POTASSIUM CHLORIDE, CALCIUM CHLORIDE 600; 310; 30; 20 MG/100ML; MG/100ML; MG/100ML; MG/100ML
INJECTION, SOLUTION INTRAVENOUS CONTINUOUS
Status: DISCONTINUED | OUTPATIENT
Start: 2023-10-24 | End: 2023-10-24 | Stop reason: HOSPADM

## 2023-10-24 RX ORDER — SODIUM CHLORIDE, SODIUM LACTATE, POTASSIUM CHLORIDE, CALCIUM CHLORIDE 600; 310; 30; 20 MG/100ML; MG/100ML; MG/100ML; MG/100ML
INJECTION, SOLUTION INTRAVENOUS
Status: DISCONTINUED | OUTPATIENT
Start: 2023-10-24 | End: 2023-10-24 | Stop reason: SURG

## 2023-10-24 RX ORDER — OMEPRAZOLE 40 MG/1
40 CAPSULE, DELAYED RELEASE ORAL DAILY
Qty: 28 CAPSULE | Refills: 0 | Status: SHIPPED | OUTPATIENT
Start: 2023-10-24 | End: 2023-11-21

## 2023-10-24 RX ADMIN — HYDROMORPHONE HYDROCHLORIDE 0.5 MG: 1 INJECTION, SOLUTION INTRAMUSCULAR; INTRAVENOUS; SUBCUTANEOUS at 08:52

## 2023-10-24 RX ADMIN — PANTOPRAZOLE SODIUM 40 MG: 40 INJECTION, POWDER, FOR SOLUTION INTRAVENOUS at 05:21

## 2023-10-24 RX ADMIN — ONDANSETRON 4 MG: 2 INJECTION INTRAMUSCULAR; INTRAVENOUS at 04:21

## 2023-10-24 RX ADMIN — PROPOFOL 50 MG: 10 INJECTION, EMULSION INTRAVENOUS at 10:25

## 2023-10-24 RX ADMIN — SODIUM CHLORIDE, POTASSIUM CHLORIDE, SODIUM LACTATE AND CALCIUM CHLORIDE: 600; 310; 30; 20 INJECTION, SOLUTION INTRAVENOUS at 10:15

## 2023-10-24 RX ADMIN — PROPOFOL 100 MG: 10 INJECTION, EMULSION INTRAVENOUS at 10:21

## 2023-10-24 RX ADMIN — SODIUM CHLORIDE: 9 INJECTION, SOLUTION INTRAVENOUS at 00:25

## 2023-10-24 RX ADMIN — PROPOFOL 50 MG: 10 INJECTION, EMULSION INTRAVENOUS at 10:26

## 2023-10-24 ASSESSMENT — ENCOUNTER SYMPTOMS
PALPITATIONS: 0
WEIGHT LOSS: 0
DEPRESSION: 0
COUGH: 0
HEARTBURN: 0
BRUISES/BLEEDS EASILY: 0
SPUTUM PRODUCTION: 0
BLURRED VISION: 0
POLYDIPSIA: 0
BLOOD IN STOOL: 0
FOCAL WEAKNESS: 0
ORTHOPNEA: 0
BACK PAIN: 0
HEADACHES: 0
VOMITING: 1
CHILLS: 0
CONSTIPATION: 0
HEMOPTYSIS: 0
FLANK PAIN: 0
ABDOMINAL PAIN: 1
DIZZINESS: 0
DIARRHEA: 1
NECK PAIN: 0
HALLUCINATIONS: 0
NAUSEA: 1
EYE PAIN: 0
TREMORS: 0
FEVER: 0
NERVOUS/ANXIOUS: 0
SPEECH CHANGE: 0
PHOTOPHOBIA: 0
DOUBLE VISION: 0

## 2023-10-24 ASSESSMENT — PAIN SCALES - GENERAL: PAIN_LEVEL: 0

## 2023-10-24 ASSESSMENT — LIFESTYLE VARIABLES: SUBSTANCE_ABUSE: 0

## 2023-10-24 ASSESSMENT — PAIN DESCRIPTION - PAIN TYPE: TYPE: ACUTE PAIN

## 2023-10-24 NOTE — PROGRESS NOTES
Discharge instructions reviewed and signed, all questions answered, no PIV, awaiting Meds to Beds.    Addendum: Patient has asked to  medications in pharmacy tomorrow morning.

## 2023-10-24 NOTE — DISCHARGE SUMMARY
Discharge Summary    CHIEF COMPLAINT ON ADMISSION  Chief Complaint   Patient presents with    Abdominal Pain     x12 hours    Vomiting     Dark red blood     Dizziness       Reason for Admission  Abd Pain     Admission Date  10/23/2023    CODE STATUS  Full Code    HPI & HOSPITAL COURSE  Katie Graham is a 23 y.o. female with anxiety, depression, GERD, history of nicotine dependence, diabetes mellitus recently diagnosed Crohn's disease, admitted 10/23/2023 with epigastric abdominal pain, nausea, and 2 episodes of bloody vomiting with orthostatic lightheadedness.  She had similar presentation 2 days ago and was sent home, but came back with ongoing symptoms.  On evaluation, WBC was 11,700.  Hemoglobin was stable.  Electrolytes and renal function were normal.  LFTs were normal.  Chest x-ray showed nothing acute.  Patient was started on IV Protonix.  GI was consulted. She then had an EGD done which showed 1 gastric erosion at the pylorus with evidence of recent bleeding which was biopsied, otherwise had a grossly normal upper GI scope.  GI recommended daily 40 mg PPI for 4 weeks then taper to 20 mg, along with outpatient GI follow-up.    She remained hemodynamically stable and afebrile.  Her hemoglobin remained stable.  She tolerated oral diet.  Her WBC count has normalized.  Her electrolytes and renal function remained normal.    I have personally seen and examined the patient on the day of discharge. With her clinical improvement, she was deemed ready to discharge from the hospital as she did not have any further hospitalization needs. Patient felt comfortable going home. The discharge plan was discussed with the patient, with which she was agreeable to.     Therefore, she is discharged in good and stable condition to home with close outpatient follow-up.      Discharge Date  10/24/2023      FOLLOW UP ITEMS POST DISCHARGE  -She will continue on Prilosec 40 mg daily for 4 weeks then 20 mg milligrams daily thereafter  until she sees outpatient GI.  -Keep outpatient follow-up appointment with GI consultants.  - counseled to seek immediate medical attention, or return to the ED for recurrent or worsening symptoms.      DISCHARGE DIAGNOSES  Principal Problem:    UGIB (upper gastrointestinal bleed) (POA: Yes)  Active Problems:    Class 3 severe obesity in adult (Formerly [facility]) (POA: Yes)    Crohn's disease of small intestine without complication ([facility]) (POA: Yes)      Overview: Reports long time history of abnormal bowel movements and hematemesis,       previously diagnosed with GERD and stomach ulcer though never EGD.      Recent ED visits with colonscopy concerning for Crohn's, confirmed on       pathology.      Discharge from hospital ~ 1 week ago. Since then reports 3-4 water/loose       BM/day, no blood      Ongoing nausea and vomitting with eating. Last episode of emesis was       yesterday.      She was not started on medications at hospital.      She has been taking tylenol for RLQ pain, not helping.       Has not been tolerating food other than lollipops and popsicles. Drinking       ok, staying hydrated with pedialyte.                 Type 2 diabetes, diet controlled ([facility]) (POA: Yes)      Overview: History of A1c's up to 8.3      Patient checks home BG twice daily      Controls with diet      Has not had A1c in over a year          Tobacco dependence (POA: Yes)  Resolved Problems:    * No resolved hospital problems. *      FOLLOW UP  Future Appointments   Date Time Provider Department Center   11/9/2023 10:30 AM SUSANNA Nina   12/7/2023  9:00 AM Adore Matias     No follow-up provider specified.    MEDICATIONS ON DISCHARGE     Medication List        START taking these medications        Instructions   * omeprazole 40 MG delayed-release capsule  Commonly known as: PriLOSEC   Take 1 Capsule by mouth every day for 28 days.  Dose: 40 mg     * omeprazole 20 MG delayed-release capsule  Start  "taking on: November 22, 2023  Commonly known as: PriLOSEC   Take 1 Capsule by mouth every day.  Dose: 20 mg           * This list has 2 medication(s) that are the same as other medications prescribed for you. Read the directions carefully, and ask your doctor or other care provider to review them with you.                CONTINUE taking these medications        Instructions   ondansetron 4 MG Tbdp  Commonly known as: Zofran ODT   Take 1 Tablet by mouth every 6 hours as needed for Nausea/Vomiting.  Dose: 4 mg     predniSONE 10 MG Tabs  Start taking on: October 4, 2023  Commonly known as: Deltasone   Take 4 Tablets by mouth every day for 7 days, THEN 3 Tablets every day for 7 days, THEN 2 Tablets every day for 7 days, THEN 1 Tablet every day for 7 days, THEN 0.5 Tablets every day for 7 days. (Please call pharmacy for refills and continuation of therapy)            STOP taking these medications      Famotidine Maximum Strength 20 MG Tabs  Generic drug: famotidine     pantoprazole 20 MG tablet  Commonly known as: Protonix              Allergies  Allergies   Allergen Reactions    Punica Hives and Rash    Latex     Sulfa Drugs Hives     Pt states \"I don't know what happens when I have it, my mom just always told me I was allergic to it and I remember being hospitalized for it when I was a kid\"       DIET  Orders Placed This Encounter   Procedures    Diet Order Diet: Full Liquid     Standing Status:   Standing     Number of Occurrences:   1     Order Specific Question:   Diet:     Answer:   Full Liquid [11]       ACTIVITY  As tolerated.  Weight bearing as tolerated    CONSULTATIONS  GI    PROCEDURES  EGD    LABORATORY  Lab Results   Component Value Date    SODIUM 140 10/23/2023    POTASSIUM 4.0 10/23/2023    CHLORIDE 104 10/23/2023    CO2 25 10/23/2023    GLUCOSE 111 (H) 10/23/2023    BUN 12 10/23/2023    CREATININE 0.85 10/23/2023    GLOMRATE 117 03/19/2022        Lab Results   Component Value Date    WBC 10.5 10/23/2023 "    HEMOGLOBIN 11.9 (L) 10/24/2023    HEMATOCRIT 39.2 10/23/2023    PLATELETCT 339 10/23/2023        Total time of the discharge process = 37 minutes.

## 2023-10-24 NOTE — CARE PLAN
The patient is Stable - Low risk of patient condition declining or worsening    Shift Goals  Clinical Goals: IV fluids, n/v control  Patient Goals: Sleep  Family Goals: N/A    Progress made toward(s) clinical / shift goals:  Patient verbalizes understanding of plan of care. Patient c/o minimal pain on admission, but denies intervention. Patient able to sleep comfortable. Denies n/v throughout the night. IV fluids in place for hemodynamic stability, vital signs stable. Bed in low and locked position, call light within reach.  Problem: Pain - Standard  Goal: Alleviation of pain or a reduction in pain to the patient’s comfort goal  Description: Target End Date:  Prior to discharge or change in level of care    Document on Vitals flowsheet    1.  Document pain using the appropriate pain scale per order or unit policy  2.  Educate and implement non-pharmacologic comfort measures (i.e. relaxation, distraction, massage, cold/heat therapy, etc.)  3.  Pain management medications as ordered  4.  Reassess pain after pain med administration per policy  5.  If opiods administered assess patient's response to pain medication is appropriate per POSS sedation scale  6.  Follow pain management plan developed in collaboration with patient and interdisciplinary team (including palliative care or pain specialists if applicable)  Outcome: Progressing     Problem: Knowledge Deficit - Standard  Goal: Patient and family/care givers will demonstrate understanding of plan of care, disease process/condition, diagnostic tests and medications  Description: Target End Date:  1-3 days or as soon as patient condition allows    Document in Patient Education    1.  Patient and family/caregiver oriented to unit, equipment, visitation policy and means for communicating concern  2.  Complete/review Learning Assessment  3.  Assess knowledge level of disease process/condition, treatment plan, diagnostic tests and medications  4.  Explain disease  process/condition, treatment plan, diagnostic tests and medications  Outcome: Progressing     Problem: Fall Risk  Goal: Patient will remain free from falls  Description: Target End Date:  Prior to discharge or change in level of care    Document interventions on the Gonzalesmelissa Kenney Fall Risk Assessment    1.  Assess for fall risk factors  2.  Implement fall precautions  Outcome: Progressing     Problem: Hemodynamics  Goal: Patient's hemodynamics, fluid balance and neurologic status will be stable or improve  Description: Target End Date:  Prior to discharge or change in level of care    Document on Assessment and I/O flowsheet templates    1.  Monitor vital signs, pulse oximetry and cardiac monitor per provider order and/or policy  2.  Maintain blood pressure per provider order  3.  Hemodynamic monitoring per provider order  4.  Manage IV fluids and IV infusions  5.  Monitor intake and output  6.  Daily weights per unit policy or provider order  7.  Assess peripheral pulses and capillary refill  8.  Assess color and body temperature  9.  Position patient for maximum circulation/cardiac output  10. Monitor for signs/symptoms of excessive bleeding  11. Assess mental status, restlessness and changes in level of consciousness  12. Monitor temperature and report fever or hypothermia to provider immediately. Consideration of targeted temperature management.  Outcome: Progressing     Problem: Fluid Volume  Goal: Fluid volume balance will be maintained  Description: Target End Date:  Prior to discharge or change in level of care    Document on I/O flowsheet    1.  Monitor intake and output as ordered  2.  Promote oral intake as appropriate  3.  Report inadequate intake or output to physician  4.  Administer IV therapy as ordered  5.  Weights per provider order  6.  Assess for signs and symptoms of bleeding  7.  Monitor for signs of fluid overload (respiratory changes, edema, weight gain, increased abdominal girth)  8.  Monitor  of signs for inadequate fluid volume (poor skin turgor, dry mucous membranes)  9.  Instruct patient on adherence to fluid restrictions  Outcome: Progressing     Problem: Gastrointestinal Irritability  Goal: Nausea and vomiting will be absent or improve  Description: Target End Date:  Prior to discharge or change in level of care    Document on I/O and Assessment flowsheets    1.  Assess for history, duration, frequency, severity, and potential precipitating factors  2.  Administer antiemetics as ordered  3.  Emesis basin within easy reach of the patient, but out of sight if psychogenic component  4.  Eliminate strong odors from surroundings  5.  Introduce cold water, ice chips, fredo products, and room temperature broth or bouillon if tolerated and appropriate to the patient's diet  6.  Encourage small amounts of bland, simple foods like broth, rice, bananas, Jell-O, crackers or toast if tolerated and appropriate to patient's diet  7.  Position the patient upright while eating and for 1 to 2 hours post-meal  8.  Encourage nonpharmacological nausea control techniques such as relaxation, guided imagery, music therapy, distraction, or deep breathing exercises  Outcome: Progressing       Patient is not progressing towards the following goals: N/A

## 2023-10-24 NOTE — PROGRESS NOTES
4 Eyes Skin Assessment Completed by TIERRA Cantrell and TIERRA Mccullough.    Head WDL  Ears WDL  Nose WDL  Mouth WDL  Neck WDL  Breast/Chest WDL  Shoulder Blades WDL  Spine WDL  (R) Arm/Elbow/Hand WDL  (L) Arm/Elbow/Hand WDL  Abdomen WDL  Groin WDL  Scrotum/Coccyx/Buttocks WDL  (R) Leg WDL  (L) Leg WDL  (R) Heel/Foot/Toe WDL  (L) Heel/Foot/Toe WDL          Devices In Places Pulse Ox      Interventions In Place Pillows    Possible Skin Injury No    Pictures Uploaded Into Epic N/A  Wound Consult Placed N/A  RN Wound Prevention Protocol Ordered No

## 2023-10-24 NOTE — PROGRESS NOTES
Patient c/o abdominal pain,npo for EGD,MD called and informed the time,updated to patient,medicated for pain.On IVF.

## 2023-10-24 NOTE — PROGRESS NOTES
Gastroenterology    CHIEF COMPLAINT:   Chief Complaint   Patient presents with    Abdominal Pain     x12 hours    Vomiting     Dark red blood     Dizziness     HPI: Katie Graham is a 23 y.o. yo female with pertinent past medical history of  recently diagnosed Crohn's disease status post colonoscopy (09/28/23), GERD, homelessness, alcohol use disorder (3 ounces per week, last drink 6 weeks ago), suicide attempt (2 years ago, no psych meds) and mast cell activation syndrome, admitted on 10/23/2023 for a 2-day history of moderate, dull epigastralgia with hematemesis and lightheadedness.    2 days prior to admission, progressive onset epigastralgia, moderate, dull that radiated to retrosternal region with bright red hematemesis (over a cup) and lightheadedness.  During first episode of vomiting, then sharp upper abdominal pain with no radiation.  Also, episode of partially formed diarrhea at night.  Denies black stools but endorses minor traces of bright red blood.  1 day prior to admission, hematemesis decreased to less than half a cup pain persisted with same characteristics.  Denies diarrhea on this day.  At no point fever/chills or weight loss.     Of note, diagnosed with Crohn disease with no complications per colonoscopy (09/28/2023) due to 2-year history of persistent chronic diarrhea, partially formed, usually 3-4 episodes daily, sometimes 1 at night, with bloating but no pain.  Endorses episodes of worsening of symptoms (5 since they started).  Denies weight loss, rashes, joint pain, eye pain or any alteration in vision.  Poor diet (once a day, veggies and quesadillas), homeless (lives in shelter), moved to [place] 3 months ago from [place]. Started prednisone 40 mg on 10/04/23 with 10 mg taper every week, compliant, with no apparent side effects before this episode, did not receive PPIs.  Also received amoxicillin on 10/12/2023 for strep throat.  Denies any other medication.    [facility] on 09/28/23, but has not made an appointment yet.    CURRENT MEDS: allergies reported for [place], Latex, and Sulfa drugs    senna-docusate (Pericolace Or Senokot S) 8.6-50 MG per tablet 2 Tablet, 2 Tablet, Oral, BID **AND** polyethylene glycol/lytes (Miralax) PACKET 1 Packet, 1 Packet, Oral, QDAY PRN **AND** magnesium hydroxide (Milk Of Magnesia) suspension 30 mL, 30 mL, Oral, QDAY PRN **AND** bisacodyl (Dulcolax) suppository 10 mg, 10 mg, Rectal, QDAY PRN, Aleks Taylor M.D.    NS infusion, , Intravenous, Continuous, Aleks Taylor M.D., Last Rate: 100 mL/hr at 10/24/23 0025, New Bag at 10/24/23 0025    acetaminophen (Tylenol) tablet 650 mg, 650 mg, Oral, Q6HRS PRN, Aleks Taylor M.D.    oxyCODONE immediate-release (Roxicodone) tablet 5 mg, 5 mg, Oral, Q3HRS PRN **OR** oxyCODONE immediate release (Roxicodone) tablet 10 mg, 10 mg, Oral, Q3HRS PRN **OR** HYDROmorphone (Dilaudid) injection 0.5 mg, 0.5 mg, Intravenous, Q3HRS PRN, Aleks Taylor M.D.    ondansetron (Zofran) syringe/vial injection 4 mg, 4 mg, Intravenous, Q4HRS PRN, Aleks Taylor M.D., 4 mg at 10/24/23 0421    ondansetron (Zofran ODT) dispertab 4 mg, 4 mg, Oral, Q4HRS PRN, Aleks Taylor M.D.    promethazine (Phenergan) tablet 12.5-25 mg, 12.5-25 mg, Oral, Q4HRS PRN, Aleks Taylor M.D.    promethazine (Phenergan) suppository 12.5-25 mg, 12.5-25 mg, Rectal, Q4HRS PRN, Aleks Taylor M.D.    prochlorperazine (Compazine) injection 5-10 mg, 5-10 mg, Intravenous, Q4HRS PRN, Aleks Taylor M.D.    labetalol (Normodyne/Trandate) injection 10 mg, 10 mg, Intravenous, Q4HRS PRN, Aleks Taylor M.D.    pantoprazole (Protonix) injection 40 mg, 40 mg, Intravenous, BID, Aleks Taylor M.D., 40 mg at 10/24/23 0521     REVIEW OF SYSTEMS: Review of Systems   Constitutional:  Positive for malaise/fatigue. Negative for chills, fever and weight loss.   Eyes:  Negative for blurred vision, double vision and pain.  "  Gastrointestinal:  Positive for abdominal pain, diarrhea and vomiting. Negative for blood in stool, constipation and melena.   Musculoskeletal:  Negative for joint pain.   Skin:  Negative for rash.   Neurological:  Negative for dizziness.   Psychiatric/Behavioral:  Negative for depression and suicidal ideas.       VITALS:    10/23/23 2200 10/23/23 2223 10/24/23 0402 10/24/23 0718   BP: 135/61 (!) 149/93 123/65 121/77   Pulse: 64 75 63 73   Resp: 16 18 18 18   Temp:  36.4 °C (97.5 °F) 36.7 °C (98 °F) 36.6 °C (97.8 °F)   TempSrc:  Temporal Temporal Temporal   SpO2: 96% 98% 97% 95%   Weight:  124 kg (273 lb 2.4 oz)     Height:  1.626 m (5' 4\")       Body mass index is 46.89 kg/m².    Physical Exam  Vitals reviewed.   Constitutional:       General: She is in acute distress.      Appearance: Normal appearance. She is obese. She is not toxic-appearing.   HENT:      Head: Normocephalic and atraumatic.      Right Ear: Tympanic membrane normal.      Left Ear: Tympanic membrane normal.      Nose: Nose normal.      Mouth/Throat:      Mouth: Mucous membranes are moist.      Pharynx: Oropharynx is clear.   Eyes:      Extraocular Movements: Extraocular movements intact.      Conjunctiva/sclera: Conjunctivae normal.      Pupils: Pupils are equal, round, and reactive to light.   Cardiovascular:      Rate and Rhythm: Normal rate and regular rhythm.      Pulses: Normal pulses.      Heart sounds: Normal heart sounds.   Pulmonary:      Effort: Pulmonary effort is normal.      Breath sounds: Normal breath sounds.   Abdominal:      General: Bowel sounds are normal. There is distension.      Palpations: Abdomen is soft. There is shifting dullness and fluid wave.      Tenderness: There is generalized abdominal tenderness. There is rebound. Positive signs include Perrin's sign and McBurney's sign.      Hernia: A hernia is present.      Comments: Generalized abdominal tenderness, moderate at superficial palpation, mildly distended.  No " increased in tenderness in Perrin, McBurney, Rovsing.   Musculoskeletal:         General: Normal range of motion.      Cervical back: Normal range of motion and neck supple.   Skin:     General: Skin is warm.      Capillary Refill: Capillary refill takes less than 2 seconds.   Neurological:      General: No focal deficit present.      Mental Status: She is alert and oriented to person, place, and time. Mental status is at baseline.   Psychiatric:         Mood and Affect: Mood normal.         Behavior: Behavior normal.         Thought Content: Thought content normal.         Judgment: Judgment normal.     LABS: No recent ESR/CRP, prealbumin (but albumin 3.7 on 10/23/2023), fecal calprotectin, stool cx, O&P, C diff panel.    10/21/23  2107 10/23/23  1757 10/23/23  2359   WBC 14.3* 11.7* 10.5   RBC 5.03 4.70 4.67   HEMOGLOBIN 13.7 12.8 12.6   HEMATOCRIT 41.0 38.8 39.2   MCV 81.5 82.6 83.9   MCH 27.2 27.2 27.0   MCHC 33.4 33.0 32.1*   RDW 44.3 45.1 46.7   PLATELETCT 400 355 339   MPV 10.8 10.2 10.8        10/21/23  2107 10/23/23  1757 10/23/23  2359   SODIUM 138 138 140   POTASSIUM 4.1 4.0 4.0   CHLORIDE 105 106 104   CO2 23 21 25   GLUCOSE 121* 103* 111*   BUN 15 12 12   CREATININE 0.87 0.68 0.85   CALCIUM 9.6 9.0 9.0        10/21/23  2107 10/23/23  1757 10/23/23  2359   ALTSGPT 30 31 30   ASTSGOT 17 14 14   ALKPHOSPHAT 63 57 52   TBILIRUBIN 0.3 0.3 0.5   LIPASE 42 35  --    GLUCOSE 121* 103* 111*     RADIOLOGY: No recent KUB, CT A/P  DX-CHEST-PORTABLE (1 VIEW)   1.  No acute cardiopulmonary disease.     Colnoscopy (09/28/2023,  [facility]): Long segment terminal ileitis with some narrowing, scar formation and necrosis of surface mucosa, s/p extensive biopsy. Mild colitis mucosa pattern in both side of colons, s/p biopsy as right and left side colitis.    ASSESSMENT AND PLAN: Katie Graham is a 23 y.o. yo female with pertinent past medical history of  recently diagnosed Crohn's disease status post colonoscopy (09/28/23),  GERD, homelessness, alcohol use disorder (3 ounces per week, last drink 6 weeks ago), suicide attempt (2 years ago, no psych meds) and mast cell activation syndrome, admitted on 10/23/2023 for a 2-day history of moderate, dull epigastralgia with hematemesis and lightheadedness.    1. Epigastric pain with hematemesis: 3-day history of dull epigastralgia radiated to retrosternal region with bright red hematemesis (over a cup) and lightheadedness.  Sharp chest pain after first episode of vomiting.  Emesis resolved today but patient still nauseous and abdominal pain persists with same characteristics.  Denies blood in stool.  Hemoglobin at baseline.  Most likely secondary to steroid use without PPIs but need to rule out Mell-Schultz.  Will perform EGD today and reassess with results.  - Strict n.p.o.  - EGD today.      2.  Crohn's disease: not acute. Recently diagnosed per colonoscopy 09/28/23.  Mild, [place] [id number]. Started on prednisone 40 mg on 10/04/23 with 10 mg taper every week, compliant, with apparent resolution of symptoms.  Baseline includes persistent chronic diarrhea, partially formed, usually 3-4 episodes daily, sometimes 1 at night, with bloating but no pain. 5 apparent flares in the last 2 years, last one 2 months ago. Denies weight loss, rashes, joint pain, eye pain or any alteration in vision.  Poor diet (once a day, veggies and quesadillas), homeless (lives in shelter), moved to [place] 3 months ago from [place].  - Hold steroids for now.  - Will reassess treatment with results of endoscopy.    Armando R. Reyes Yparraguirre, M.D.  Internal Medicine Resident PGY-1  [facility]      This note was generated using voice recognition software which has a small chance of producing errors of grammar and possibly content. I have made every reasonable attempt to find and correct any obvious errors but expect that some may not be found prior to finalization of this note.  No

## 2023-10-24 NOTE — ED NOTES
Report to receiving RN. Pt transported to floor with transport staff. All belongings and chart transported with pt.

## 2023-10-24 NOTE — ED NOTES
Med rec updated and complete. Allergies reviewed. Confirmed name and date of birth.   Pt stated that she is currently in her 4th day of prednisone 20 mg. Pt has been on a taper dose since 10/06/23.  Pt reports that she has not been on antibiotics in about 1 month.    Home pharmacy     [facility]   [phone]

## 2023-10-24 NOTE — ASSESSMENT & PLAN NOTE
History of Crohn disease and GERD, presented with hematemesis  Hemoglobin is stable, but may be hemoconcentrated  Plan: IV Protonix 40 mg twice daily  Zofran as needed  GI consult tomorrow for endoscopy  Monitor H&H, transfuse for hemoglobin 7.0 and below

## 2023-10-24 NOTE — ED TRIAGE NOTES
"Chief Complaint   Patient presents with    Abdominal Pain     x12 hours    Vomiting     Dark red blood     Dizziness     Pt to triage for above complaint. Pt seen here for the same complaint - was supposed to be admitted. Protocol ordered.     Pt educated on triage process and placed in lobby.      /81   Pulse 90   Temp 37.1 °C (98.8 °F) (Temporal)   Resp 16   Ht 1.626 m (5' 4\")   SpO2 95%       "

## 2023-10-24 NOTE — H&P
Hospital Medicine History & Physical Note    Date of Service  10/23/2023    Primary Care Physician  Zully Medina M.D.      Code Status  Full code    Chief Complaint  Chief Complaint   Patient presents with    Abdominal Pain     x12 hours    Vomiting     Dark red blood     Dizziness       History of Presenting Illness  Katie Graham is a 23 y.o. female with past medical history of anxiety, depression, GERD, nicotine dependence, recently diagnosed Crohn's disease, who presented 10/23/2023 with 2 presented with epigastric abdominal pain, nausea and 2 episodes of bloody vomiting, orthostatic lightheadedness.  Patient was seen already here 2 days ago with epigastric pain and discharged home.  However she returned due to ongoing symptoms.  Requested admission for GI consult and endoscopy tomorrow.  Hemoglobin stable and did not drop.    I discussed the plan of care with patient and ERP .    Review of Systems  Review of Systems   Constitutional:  Negative for chills, fever and weight loss.   HENT:  Negative for ear pain, hearing loss and tinnitus.    Eyes:  Negative for blurred vision, double vision and photophobia.   Respiratory:  Negative for cough, hemoptysis and sputum production.    Cardiovascular:  Negative for chest pain, palpitations and orthopnea.   Gastrointestinal:  Positive for nausea and vomiting. Negative for heartburn.        Vomiting with blood   Genitourinary:  Negative for dysuria, flank pain, frequency and hematuria.   Musculoskeletal:  Negative for back pain, joint pain and neck pain.   Skin:  Negative for itching and rash.   Neurological:  Negative for tremors, speech change, focal weakness and headaches.   Endo/Heme/Allergies:  Negative for environmental allergies and polydipsia. Does not bruise/bleed easily.   Psychiatric/Behavioral:  Negative for hallucinations and substance abuse. The patient is not nervous/anxious.        Past Medical History   has a past medical history of Allergy,  "Anxiety, Borderline personality disorder (HCC), Depression, Family history of diabetes mellitus (11/04/2020), GERD (gastroesophageal reflux disease), Head ache, Mast cell activation syndrome (HCC), Migraine, POTS (postural orthostatic tachycardia syndrome), Substance abuse (HCC), and Suicidal ideation (9/24/2021).    Surgical History   has a past surgical history that includes laparoscopic umbilical hernia repair (2002); pr colonoscopy,diagnostic (N/A, 9/28/2023); and pr colonoscopy,biopsy (N/A, 9/28/2023).     Family History  family history includes Alcohol abuse in her father, maternal aunt, maternal grandfather, maternal grandmother, mother, and paternal grandfather; Cancer in her maternal grandmother and maternal great-grandmother; Cancer (age of onset: 54) in her mother; Diabetes in her maternal grandfather and sister; Diabetes (age of onset: 30) in her mother; Genetic Disorder in her sister; Heart Disease in her maternal grandfather and paternal grandmother; Hyperlipidemia in her maternal grandfather and paternal grandmother; Hypertension in her father, maternal grandfather, and paternal grandmother; Kidney Disease in her father; Lung Disease in her maternal aunt; No Known Problems in her brother and sister.   Family history reviewed with patient. There is no family history that is pertinent to the chief complaint.     Social History   reports that she quit smoking about 4 years ago. Her smoking use included cigarettes. She started smoking about 5 years ago. She smoked an average 0.1 packs per day for 0.2 years. She has never used smokeless tobacco. She reports that she does not currently use alcohol after a past usage of about 3.6 oz of alcohol per week. She reports current drug use. Drugs: Marijuana and Inhaled.    Allergies  Allergies   Allergen Reactions    Punica Hives and Rash    Latex     Sulfa Drugs Hives     Pt states \"I don't know what happens when I have it, my mom just always told me I was allergic " "to it and I remember being hospitalized for it when I was a kid\"       Medications  Prior to Admission Medications   Prescriptions Last Dose Informant Patient Reported? Taking?   famotidine (PEPCID) 20 MG Tab   No No   Sig: Take 1 Tablet by mouth 2 times a day as needed (pain). For upper belly pain   ondansetron (ZOFRAN ODT) 4 MG TABLET DISPERSIBLE   No No   Sig: Take 1 Tablet by mouth every 6 hours as needed for Nausea/Vomiting.   pantoprazole (PROTONIX) 20 MG tablet   No No   Sig: Take 1 Tablet by mouth every day.   predniSONE (DELTASONE) 10 MG Tab   No No   Sig: Take 4 Tablets by mouth every day for 7 days, THEN 3 Tablets every day for 7 days, THEN 2 Tablets every day for 7 days, THEN 1 Tablet every day for 7 days, THEN 0.5 Tablets every day for 7 days. (Please call pharmacy for refills and continuation of therapy)      Facility-Administered Medications: None       Physical Exam  Temp:  [37.1 °C (98.8 °F)] 37.1 °C (98.8 °F)  Pulse:  [88-90] 88  Resp:  [16] 16  BP: (116-157)/(72-81) 157/72  SpO2:  [95 %-97 %] 97 %  Blood Pressure: (!) 157/72   Temperature: 37.1 °C (98.8 °F)   Pulse: 88   Respiration: 16   Pulse Oximetry: 97 %       Physical Exam  Vitals and nursing note reviewed.   Constitutional:       General: She is not in acute distress.     Appearance: Normal appearance.   HENT:      Head: Normocephalic and atraumatic.      Nose: Nose normal.      Mouth/Throat:      Mouth: Mucous membranes are moist.   Eyes:      Extraocular Movements: Extraocular movements intact.      Pupils: Pupils are equal, round, and reactive to light.   Cardiovascular:      Rate and Rhythm: Normal rate and regular rhythm.   Pulmonary:      Effort: Pulmonary effort is normal.      Breath sounds: Normal breath sounds.   Abdominal:      General: Abdomen is flat. There is no distension.      Tenderness: There is no abdominal tenderness.   Musculoskeletal:         General: No swelling or deformity. Normal range of motion.      Cervical " "back: Normal range of motion and neck supple.   Skin:     General: Skin is warm and dry.   Neurological:      General: No focal deficit present.      Mental Status: She is alert and oriented to person, place, and time.   Psychiatric:         Mood and Affect: Mood normal.         Behavior: Behavior normal.         Laboratory:  Recent Labs     10/21/23  2107 10/23/23  1757   WBC 14.3* 11.7*   RBC 5.03 4.70   HEMOGLOBIN 13.7 12.8   HEMATOCRIT 41.0 38.8   MCV 81.5 82.6   MCH 27.2 27.2   MCHC 33.4 33.0   RDW 44.3 45.1   PLATELETCT 400 355   MPV 10.8 10.2     Recent Labs     10/21/23  2107 10/23/23  1757   SODIUM 138 138   POTASSIUM 4.1 4.0   CHLORIDE 105 106   CO2 23 21   GLUCOSE 121* 103*   BUN 15 12   CREATININE 0.87 0.68   CALCIUM 9.6 9.0     Recent Labs     10/21/23  2107 10/23/23  1757   ALTSGPT 30 31   ASTSGOT 17 14   ALKPHOSPHAT 63 57   TBILIRUBIN 0.3 0.3   LIPASE 42 35   GLUCOSE 121* 103*     Recent Labs     10/23/23  1757   APTT 26.4   INR 1.10     No results for input(s): \"NTPROBNP\" in the last 72 hours.      No results for input(s): \"TROPONINT\" in the last 72 hours.    Imaging:  DX-CHEST-PORTABLE (1 VIEW)   Final Result         1.  No acute cardiopulmonary disease.          X-Ray:  I have personally reviewed the images and compared with prior images.    Assessment/Plan:  Justification for Admission Status  I anticipate this patient will require at least two midnights for appropriate medical management, necessitating inpatient admission because hematemesis    Patient will need a Med/Surg bed on MEDICAL service .  The need is secondary to hematemesis.    * Hematemesis- (present on admission)  Assessment & Plan  History of Crohn disease and GERD, presented with hematemesis  Hemoglobin is stable, but may be hemoconcentrated  Plan: IV Protonix 40 mg twice daily  Zofran as needed  GI consult tomorrow for endoscopy  Monitor H&H, transfuse for hemoglobin 7.0 and below    Type 2 diabetes, diet controlled (HCC)- " (present on admission)  Assessment & Plan  Blood sugar 103    Crohn's disease of small intestine without complication (HCC)- (present on admission)  Assessment & Plan  Follow-up with gastroenterology    Class 3 severe obesity in adult (HCC)- (present on admission)  Assessment & Plan  BMI 46        VTE prophylaxis: SCDs/TEDs

## 2023-10-24 NOTE — PROGRESS NOTES
Pt arrived to unit via gurney. Pt oriented to room, unit, and plan of care. Pt A&Ox4 on RA with otoniel MCGEE. Patient has minimal c/o pain at this time. Patient updated on plan of care, no further needs at this time. Call light within reach; fall precautions in place.

## 2023-10-24 NOTE — PROGRESS NOTES
- Katie Graham presents with hematemesis.  The patient continues to smoke 0.5 pack of cigarettes per day, which she restarted 1 week ago.  She has tried to quit before. I spent 11 minutes counseling the patient on cessation techniques and resources were offered including nicotine patches, gum, along with medical treatment with wellbutrin and chantix. The patient understands continuing to smoke could lead to complications such as lung disease, heart attack, stroke and death.  The benefits of stopping were also presented to her. The patient verbalized understanding. CPT CODE: 85356 (3-10 minutes tobacco counseling).

## 2023-10-24 NOTE — PROCEDURES
OPERATIVE REPORT    PATIENT:   Katie Graham   2000       PREOPERATIVE DIAGNOSES/INDICATIONS: GI bleeding, upper.     POSTOPERATIVE DIAGNOSIS:   One gastric erosion at pylorus with evidence of recent bleeding, s/p biopsy.   Otherwise grossly upper GI scope.     PROCEDURE:  ESOPHAGOGASTRODUODENOSCOPY    PHYSICIAN:  Jamison Jones MD. MPH.    ANESTHESIA:  Per anesthesiologist or ICU team.     LOCATION: [facility]    CONSENT:  OBTAINED. The risks, benefits and alternatives of the procedure were discussed in details. The risks include and are not limited to bleeding, infection, perforation, missed lesions, and sedations risks (cardiopulmonary compromise and allergic reaction to medications).    DESCRIPTION: The patient presented to the procedure room.  After routine checkup was performed, patient was brought into the endoscopy suite.  Patient was placed on his left lateral decubitus position. A bite block was placed in patient's mouth. Patient was sedated by anesthesia.  Vital signs were monitored throughout the procedure.  Oxygenation support was provided throughout the procedure. Upper endoscope was inserted into patient's mouth and advanced to the second portion of the duodenum under direct visualization.      Once the site was reached and examined, the upper endoscope was withdrawn.  Retroflexion was made within the stomach.  The stomach was decompressed, scope was withdrawn and the procedure was terminated.    The patient tolerated the procedure well.  There were no immediate complications.    OPERATIVE FINDINGS:  One gastric erosion at pylorus with evidence of recent bleeding, s/p biopsy.   Otherwise grossly upper GI scope.     RECOMMENDATIONS:  Daily 40mg ppi for 4 weeks then taper to 20mg, stay on 20mg until seeing outpatient provider at [facility].     Inpatient GI team will continue to follow up.       This note has been transcribed with digital voice recognition software and although it has been reviewed may  contain grammatical or word errors

## 2023-10-24 NOTE — CONSULTS
Gastroenterology    I have personally seen and examined the patient and discussed the management with the resident.    I reviewed the resident's note and agree with the documented findings and plan of care.           CHIEF COMPLAINT:   Chief Complaint   Patient presents with    Abdominal Pain     x12 hours    Vomiting     Dark red blood     Dizziness     HPI: Katie Graham is a 23 y.o. yo female with pertinent past medical history of  recently diagnosed Crohn's disease status post colonoscopy (09/28/23), GERD, homelessness, alcohol use disorder (3 ounces per week, last drink 6 weeks ago), suicide attempt (2 years ago, no psych meds) and mast cell activation syndrome, admitted on 10/23/2023 for a 2-day history of moderate, dull epigastralgia with hematemesis and lightheadedness.    2 days prior to admission, progressive onset epigastralgia, moderate, dull that radiated to retrosternal region with bright red hematemesis (over a cup) and lightheadedness.  During first episode of vomiting, then sharp upper abdominal pain with no radiation.  Also, episode of partially formed diarrhea at night.  Denies black stools but endorses minor traces of bright red blood.  1 day prior to admission, hematemesis decreased to less than half a cup pain persisted with same characteristics.  Denies diarrhea on this day.  At no point fever/chills or weight loss.     Of note, diagnosed with Crohn disease with no complications per colonoscopy (09/28/2023) due to 2-year history of persistent chronic diarrhea, partially formed, usually 3-4 episodes daily, sometimes 1 at night, with bloating but no pain.  Endorses episodes of worsening of symptoms (5 since they started).  Denies weight loss, rashes, joint pain, eye pain or any alteration in vision.  Poor diet (once a day, veggies and quesadillas), homeless (lives in shelter), moved to [place] 3 months ago from [place]. Started prednisone 40 mg on 10/04/23 with 10 mg taper every week,  compliant, with no apparent side effects before this episode, did not receive PPIs.  Also received amoxicillin on 10/12/2023 for strep throat.  Denies any other medication.    [facility] on 09[facility], but has not made an appointment yet.    CURRENT MEDS: allergies reported for Punica, Latex, and Sulfa drugs    senna-docusate (Pericolace Or Senokot S) 8.6-50 MG per tablet 2 Tablet, 2 Tablet, Oral, BID **AND** polyethylene glycol/lytes (Miralax) PACKET 1 Packet, 1 Packet, Oral, QDAY PRN **AND** magnesium hydroxide (Milk Of Magnesia) suspension 30 mL, 30 mL, Oral, QDAY PRN **AND** bisacodyl (Dulcolax) suppository 10 mg, 10 mg, Rectal, QDAY PRN, Aleks Taylor M.D.    NS infusion, , Intravenous, Continuous, Aleks Taylor M.D., Last Rate: 100 mL/hr at 10/24/23 0025, New Bag at 10/24/23 0025    acetaminophen (Tylenol) tablet 650 mg, 650 mg, Oral, Q6HRS PRN, Aleks Taylor M.D.    oxyCODONE immediate-release (Roxicodone) tablet 5 mg, 5 mg, Oral, Q3HRS PRN **OR** oxyCODONE immediate release (Roxicodone) tablet 10 mg, 10 mg, Oral, Q3HRS PRN **OR** HYDROmorphone (Dilaudid) injection 0.5 mg, 0.5 mg, Intravenous, Q3HRS PRN, Aleks Taylor M.D.    ondansetron (Zofran) syringe/vial injection 4 mg, 4 mg, Intravenous, Q4HRS PRN, Aleks Taylor M.D., 4 mg at 10/24/23 0421    ondansetron (Zofran ODT) dispertab 4 mg, 4 mg, Oral, Q4HRS PRN, Aleks Taylor M.D.    promethazine (Phenergan) tablet 12.5-25 mg, 12.5-25 mg, Oral, Q4HRS PRN, Aleks Taylor M.D.    promethazine (Phenergan) suppository 12.5-25 mg, 12.5-25 mg, Rectal, Q4HRS PRN, Aleks Taylor M.D.    prochlorperazine (Compazine) injection 5-10 mg, 5-10 mg, Intravenous, Q4HRS PRN, Aleks Taylor M.D.    labetalol (Normodyne/Trandate) injection 10 mg, 10 mg, Intravenous, Q4HRS PRN, Aleks Taylor M.D.    pantoprazole (Protonix) injection 40 mg, 40 mg, Intravenous, BID, Aleks Taylor M.D., 40 mg at 10/24/23 0521  "    REVIEW OF SYSTEMS: Review of Systems   Constitutional:  Positive for malaise/fatigue. Negative for chills, fever and weight loss.   Eyes:  Negative for blurred vision, double vision and pain.   Gastrointestinal:  Positive for abdominal pain, diarrhea and vomiting. Negative for blood in stool, constipation and melena.   Musculoskeletal:  Negative for joint pain.   Skin:  Negative for rash.   Neurological:  Negative for dizziness.   Psychiatric/Behavioral:  Negative for depression and suicidal ideas.       VITALS:    10/23/23 2200 10/23/23 2223 10/24/23 0402 10/24/23 0718   BP: 135/61 (!) 149/93 123/65 121/77   Pulse: 64 75 63 73   Resp: 16 18 18 18   Temp:  36.4 °C (97.5 °F) 36.7 °C (98 °F) 36.6 °C (97.8 °F)   TempSrc:  Temporal Temporal Temporal   SpO2: 96% 98% 97% 95%   Weight:  124 kg (273 lb 2.4 oz)     Height:  1.626 m (5' 4\")       Body mass index is 46.89 kg/m².    Physical Exam  Vitals reviewed.   Constitutional:       General: She is in acute distress.      Appearance: Normal appearance. She is obese. She is not toxic-appearing.   HENT:      Head: Normocephalic and atraumatic.      Right Ear: Tympanic membrane normal.      Left Ear: Tympanic membrane normal.      Nose: Nose normal.      Mouth/Throat:      Mouth: Mucous membranes are moist.      Pharynx: Oropharynx is clear.   Eyes:      Extraocular Movements: Extraocular movements intact.      Conjunctiva/sclera: Conjunctivae normal.      Pupils: Pupils are equal, round, and reactive to light.   Cardiovascular:      Rate and Rhythm: Normal rate and regular rhythm.      Pulses: Normal pulses.      Heart sounds: Normal heart sounds.   Pulmonary:      Effort: Pulmonary effort is normal.      Breath sounds: Normal breath sounds.   Abdominal:      General: Bowel sounds are normal. There is distension.      Palpations: Abdomen is soft. There is shifting dullness and fluid wave.      Tenderness: There is generalized abdominal tenderness. There is rebound. " Positive signs include Perrin's sign and McBurney's sign.      Hernia: A hernia is present.      Comments: Generalized abdominal tenderness, moderate at superficial palpation, mildly distended.  No increased in tenderness in Perrin, McBurney, Rovsing.   Musculoskeletal:         General: Normal range of motion.      Cervical back: Normal range of motion and neck supple.   Skin:     General: Skin is warm.      Capillary Refill: Capillary refill takes less than 2 seconds.   Neurological:      General: No focal deficit present.      Mental Status: She is alert and oriented to person, place, and time. Mental status is at baseline.   Psychiatric:         Mood and Affect: Mood normal.         Behavior: Behavior normal.         Thought Content: Thought content normal.         Judgment: Judgment normal.     LABS: No recent ESR/CRP, prealbumin (but albumin 3.7 on 10/23/2023), fecal calprotectin, stool cx, O&P, C diff panel.    10/21/23  2107 10/23/23  1757 10/23/23  2359   WBC 14.3* 11.7* 10.5   RBC 5.03 4.70 4.67   HEMOGLOBIN 13.7 12.8 12.6   HEMATOCRIT 41.0 38.8 39.2   MCV 81.5 82.6 83.9   MCH 27.2 27.2 27.0   MCHC 33.4 33.0 32.1*   RDW 44.3 45.1 46.7   PLATELETCT 400 355 339   MPV 10.8 10.2 10.8        10/21/23  2107 10/23/23  1757 10/23/23  2359   SODIUM 138 138 140   POTASSIUM 4.1 4.0 4.0   CHLORIDE 105 106 104   CO2 23 21 25   GLUCOSE 121* 103* 111*   BUN 15 12 12   CREATININE 0.87 0.68 0.85   CALCIUM 9.6 9.0 9.0        10/21/23  2107 10/23/23  1757 10/23/23  2359   ALTSGPT 30 31 30   ASTSGOT 17 14 14   ALKPHOSPHAT 63 57 52   TBILIRUBIN 0.3 0.3 0.5   LIPASE 42 35  --    GLUCOSE 121* 103* 111*     RADIOLOGY: No recent KUB, CT A/P  DX-CHEST-PORTABLE (1 VIEW)   1.  No acute cardiopulmonary disease.     Colnoscopy (09/28/2023,  Jones): Long segment terminal ileitis with some narrowing, scar formation and necrosis of surface mucosa, s/p extensive biopsy. Mild colitis mucosa pattern in both side of colons, s/p biopsy as right  and left side colitis.    ASSESSMENT AND PLAN: Katie Graham is a 23 y.o. yo female with pertinent past medical history of  recently diagnosed Crohn's disease status post colonoscopy (09/28/23), GERD, homelessness, alcohol use disorder (3 ounces per week, last drink 6 weeks ago), suicide attempt (2 years ago, no psych meds) and mast cell activation syndrome, admitted on 10/23/2023 for a 2-day history of moderate, dull epigastralgia with hematemesis and lightheadedness.    1. Epigastric pain with hematemesis: 3-day history of dull epigastralgia radiated to retrosternal region with bright red hematemesis (over a cup) and lightheadedness.  Sharp chest pain after first episode of vomiting.  Emesis resolved today but patient still nauseous and abdominal pain persists with same characteristics.  Denies blood in stool.  Hemoglobin at baseline.  Most likely secondary to steroid use without PPIs but need to rule out Mell-Schultz.  Will perform EGD today and reassess with results.  - Strict n.p.o.  - EGD today.      2.  Crohn's disease: not acute. Recently diagnosed per colonoscopy 09/28/23.  Mild, [place] [id number]. Started on prednisone 40 mg on 10/04/23 with 10 mg taper every week, compliant, with apparent resolution of symptoms.  Baseline includes persistent chronic diarrhea, partially formed, usually 3-4 episodes daily, sometimes 1 at night, with bloating but no pain. 5 apparent flares in the last 2 years, last one 2 months ago. Denies weight loss, rashes, joint pain, eye pain or any alteration in vision.  Poor diet (once a day, veggies and quesadillas), homeless (lives in shelter), moved to [place] 3 months ago from [place].  - Hold steroids for now.  - Will reassess treatment with results of endoscopy.    Armando R. Reyes Yparraguirre, M.D.  Internal Medicine Resident PGY-1  [facility]      This note was generated using voice recognition software which has a small chance of producing  errors of grammar and possibly content. I have made every reasonable attempt to find and correct any obvious errors but expect that some may not be found prior to finalization of this note.

## 2023-10-24 NOTE — OR NURSING
1030 - pt arrives from OR to PACU 3, report received from RN and anesthesia. Pt place on monitor. VSS,  NAD noted.    O2 6L via mask.      1034-Dr Jones at bedside.     1035- pt waking, tolerating sips of water  Tongue ring returned and pt replaced.     1050-report to [facility],  RN    1100-pt sitting up, tolerating clears.  Awaiting transport.      1105-pt transported back to T207, all belongings accounted for.

## 2023-10-24 NOTE — DISCHARGE INSTRUCTIONS
Upper Gastrointestinal Bleeding    Upper gastrointestinal (GI) bleeding is bleeding from the esophagus, the stomach, or the duodenum. The duodenum is the first part of the small intestine. The esophagus is the part of the body that moves food from the mouth to the stomach. Upper GI bleeding may cause a person to vomit blood or have bloody or black stools (feces).  Bleeding can be mild, serious, or life-threatening. Heavy bleeding or bleeding that does not go away may need emergency treatment at a hospital.  What are the causes?  This condition may be caused by:  A sore in the lining of your stomach or duodenum (peptic ulcer). This is the most common cause of GI bleeding.  Esophagitis, or inflammation of your esophagus.  A tear in the esophagus.  Cancer of the esophagus, stomach, or duodenum.  An abnormal or weakened blood vessel in one of your upper GI structures.  A disorder that makes it hard to form blood clots. This can cause easy bleeding (coagulopathy).  What increases the risk?  The following factors may make you more likely to develop this condition:  Being older than age 60.  Being male.  Having certain medical conditions. These may include:  Liver or kidney disease.  Stomach infection caused by Helicobacter pylori bacteria.  Having frequent or severe vomiting.  Heavy use of alcohol.  Regular use of aspirin or NSAIDs, such as ibuprofen.  Taking blood thinners (anticoagulants) or anti-platelet medicines.  What are the signs or symptoms?  Symptoms of this condition include:  Vomiting blood that may be bright red or the color of coffee grounds.  Black or maroon-colored stools, or bloody stools.  Racing heartbeat, weakness, or dizziness.  Heartburn or pain in your abdomen.  Trouble swallowing.  Weight loss.  The skin or the white parts of your eyes turning yellow (jaundice).  How is this diagnosed?  This condition may be diagnosed based on:  Your symptoms and medical history.  A physical exam. During the exam,  your health care provider will check for signs of blood loss, such as low blood pressure and a fast pulse.  Tests or procedures. These may include:  Blood tests to check for low blood cell count and other signs of blood loss. Tests may also be done to check for clotting ability.  Blood tests to check your liver and kidney function.  A chest X-ray to look for a tear in the esophagus.  Endoscopy. In this procedure, a flexible scope is put down your esophagus and into your stomach or duodenum. The scope will look for the source of bleeding.  Angiogram. This may be done if the source of bleeding is not found during endoscopy. For an angiogram, X-rays are taken after a dye is injected into your bloodstream.  Nasogastric tube insertion. This is a tube that is passed through your nose and into your stomach. A suction is done to see if any blood comes out.  How is this treated?  Treatment for this condition depends on the cause of your bleeding. Active bleeding is treated at the hospital. Treatment may include:  Getting fluids through an IV inserted into one of your veins.  Getting blood through an IV (blood transfusion).  Getting high doses of medicine through the IV to lower stomach acid. This may be done to treat ulcers.  Having endoscopy to treat an area of bleeding with high heat (cauterization), injections, or surgical clips.  Having a procedure to block blood flow to the bleeding site (embolization).  Stopping or changing some of your regular medicines for a certain amount of time.  Having other surgical procedures if initial treatments do not control bleeding.  Follow these instructions at home:  Eating and drinking    Follow instructions from your health care provider about eating or drinking restrictions.  Try to eat foods that have a lot of iron. These may include red meat, shellfish, chicken, eggs, and vegetables such as spinach or broccoli.  Drink enough fluid to keep your urine pale yellow.  Do not drink  alcohol.  General instructions    Do not use any products that contain nicotine or tobacco. These products include cigarettes, chewing tobacco, and vaping devices, such as e-cigarettes. If you need help quitting, ask your health care provider.  Take over-the-counter and prescription medicines only as told by your health care provider. You may need to avoid aspirin, NSAIDs, or other medicines that increase bleeding.  Return to your normal activities as told by your health care provider. Ask your health care provider what activities are safe for you.  Keep all follow-up visits. This is important.  Contact a health care provider if:  You have heartburn or pain in your abdomen.  You lose weight without trying.  You have trouble swallowing.  You vomit often.  You feel weak or dizzy.  You need help to stop smoking or drinking alcohol.  Get help right away if:  You vomit blood.  You faint.  You have blood in your stools.  You develop jaundice.  You have severe cramps in your back or abdomen.  Your symptoms of upper GI bleeding come back after treatment.  These symptoms may be an emergency. Get help right away. Call 911.  Do not wait to see if the symptoms will go away.  Do not drive yourself to the hospital.  Summary  If you have upper gastrointestinal (GI) bleeding, you may vomit blood or have bloody or black stools (feces).  Follow instructions from your health care provider about eating or drinking restrictions.  Do not drink alcohol. Do not use any products that contain nicotine or tobacco, such as cigarettes, e-cigarettes, and chewing tobacco.  Take over-the-counter and prescription medicines only as told by your health care provider. You may need to avoid NSAIDs or other medicines that increase bleeding.  This information is not intended to replace advice given to you by your health care provider. Make sure you discuss any questions you have with your health care provider.  Document Revised: 07/22/2022 Document  Reviewed: 07/22/2022  [employer] Patient Education © 2023 [employer]        Upper Endoscopy, Adult, Care After  After the procedure, it is common to have a sore throat. It is also common to have:  Mild stomach pain or discomfort.  Bloating.  Nausea.  Follow these instructions at home:  The instructions below may help you care for yourself at home. Your health care provider may give you more instructions. If you have questions, ask your health care provider.  If you were given a sedative during the procedure, it can affect you for several hours. Do not drive or operate machinery until your health care provider says that it is safe.  If you will be going home right after the procedure, plan to have a responsible adult:  Take you home from the hospital or clinic. You will not be allowed to drive.  Care for you for the time you are told.  Follow instructions from your health care provider about what you may eat and drink.  Return to your normal activities as told by your health care provider. Ask your health care provider what activities are safe for you.  Take over-the-counter and prescription medicines only as told by your health care provider.  Contact a health care provider if you:  Have a sore throat that lasts longer than one day.  Have trouble swallowing.  Have a fever.  Get help right away if you:  Vomit blood or your vomit looks like coffee grounds.  Have bloody, black, or tarry stools.  Have a very bad sore throat or you cannot swallow.  Have difficulty breathing or very bad pain in your chest or abdomen.  These symptoms may be an emergency. Get help right away. Call 911.  Do not wait to see if the symptoms will go away.  Do not drive yourself to the hospital.  Summary  After the procedure, it is common to have a sore throat, mild stomach discomfort, bloating, and nausea.  If you were given a sedative during the procedure, it can affect you for several hours. Do not drive until your health care provider says  that it is safe.  Follow instructions from your health care provider about what you may eat and drink.  Return to your normal activities as told by your health care provider.  This information is not intended to replace advice given to you by your health care provider. Make sure you discuss any questions you have with your health care provider.  Document Revised: 03/29/2023 Document Reviewed: 03/29/2023  Elsevier Patient Education © 2023 [employer]

## 2023-10-24 NOTE — ANESTHESIA POSTPROCEDURE EVALUATION
Patient: Katie Graham    Procedure Summary     Date: 10/24/23 Room / Location: [facility] ROOM 26 / SURGERY SAME DAY [facility]    Anesthesia Start: 1015 Anesthesia Stop: 1031    Procedures:       GASTROSCOPY (Esophagus)      GASTROSCOPY, WITH BIOPSY (Esophagus) Diagnosis: (Gastric Erosion)    Surgeons: Jamison Jones M.D. Responsible Provider: Michael Webb M.D.    Anesthesia Type: general ASA Status: 3          Final Anesthesia Type: general  Last vitals  BP   Blood Pressure: 111/58    Temp   36.5 °C (97.7 °F)    Pulse   91   Resp   18    SpO2   100 %      Anesthesia Post Evaluation    Patient participation: complete - patient participated  Level of consciousness: awake and alert  Pain score: 0    Airway patency: patent  Anesthetic complications: no  Cardiovascular status: adequate and hemodynamically stable  Respiratory status: acceptable  Hydration status: acceptable    PONV: none          No notable events documented.     Nurse Pain Score: 3 (NPRS)

## 2023-10-24 NOTE — ED PROVIDER NOTES
ED Provider Note    CHIEF COMPLAINT  Chief Complaint   Patient presents with    Abdominal Pain     x12 hours    Vomiting     Dark red blood     Dizziness       EXTERNAL RECORDS REVIEWED  Outpatient Notes office visit on 10/4/2023 for follow-up for Crohn's disease    HPI/ROS  LIMITATION TO HISTORY   Select: : None  OUTSIDE HISTORIAN(S):      Katie Graham is a 23 y.o. female who presents with 12 hours of abdominal pain, 2 episodes of bloody emesis, lightheadedness.  Patient was recently diagnosed with Crohn disease.  Patient was seen by myself 2 days ago and discharge room because she reported that her pain had improved.  Patient attempted to manage her pain at home with oral medicines but it worsened to the point where she had to come back to the emergency department.    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    SURGICAL HISTORY   has a past surgical history that includes laparoscopic umbilical hernia repair (2002); colonoscopy,diagnostic (N/A, 9/28/2023); and colonoscopy,biopsy (N/A, 9/28/2023).    FAMILY HISTORY  Family History   Problem Relation Age of Onset    Cancer Mother 54        bone cancer with mets    Diabetes Mother 30        Type 2    Alcohol abuse Mother         sober 18 months    Hypertension Father     Alcohol abuse Father     Kidney Disease Father     Diabetes Sister         type 2    Genetic Disorder Sister         club foot    No Known Problems Sister     No Known Problems Brother     Lung Disease Maternal Aunt     Alcohol abuse Maternal Aunt     Cancer Maternal Grandmother         Lung    Alcohol abuse Maternal Grandmother     Diabetes Maternal Grandfather        "  type 2    Heart Disease Maternal Grandfather     Hypertension Maternal Grandfather     Hyperlipidemia Maternal Grandfather     Alcohol abuse Maternal Grandfather     Heart Disease Paternal Grandmother     Hypertension Paternal Grandmother     Hyperlipidemia Paternal Grandmother     Alcohol abuse Paternal Grandfather     Cancer Maternal great-grandmother         breast       SOCIAL HISTORY  Social History     Tobacco Use    Smoking status: Every Day     Current packs/day: 0.50     Average packs/day: 0.1 packs/day for 0.2 years     Types: Cigarettes     Start date: 8/14/2018     Last attempt to quit: 11/4/2018    Smokeless tobacco: Never    Tobacco comments:     Quit after 3 months   Vaping Use    Vaping Use: Former    Substances: Nicotine, Flavoring, 0 anali     Devices: Refillable tank   Substance and Sexual Activity    Alcohol use: Not Currently     Alcohol/week: 3.0 oz     Types: 5 Shots of liquor per week    Drug use: Not Currently     Types: Marijuana, Inhaled     Comment: past hx of using multiple drugs in high school    Sexual activity: Not Currently     Partners: Female, Male     Birth control/protection: Condom Male       CURRENT MEDICATIONS  Home Medications       Reviewed by Juanita Alicia (Pharmacy Tech) on 10/23/23 at 2158  Med List Status: Complete     Medication Last Dose Status   famotidine (PEPCID) 20 MG Tab 10/23/2023 Active   ondansetron (ZOFRAN ODT) 4 MG TABLET DISPERSIBLE 10/23/2023 Active   pantoprazole (PROTONIX) 20 MG tablet 10/23/2023 Active   predniSONE (DELTASONE) 10 MG Tab 10/23/2023 Active                    ALLERGIES  Allergies   Allergen Reactions    Punica Hives and Rash    Latex     Sulfa Drugs Hives     Pt states \"I don't know what happens when I have it, my mom just always told me I was allergic to it and I remember being hospitalized for it when I was a kid\"       PHYSICAL EXAM  VITAL SIGNS: BP (!) 149/93   Pulse 75   Temp 36.4 °C (97.5 °F) (Temporal)   Resp 18   Ht 1.626 m " "(5' 4\")   Wt 124 kg (273 lb 2.4 oz)   LMP 10/18/2023 (Approximate)   SpO2 98%   BMI 46.89 kg/m²    Vitals and nursing note reviewed.   Constitutional:       Comments: Patient is lying in bed supine, pleasant, conversant, speaking in complete sentences   HENT:      Head: Normocephalic and atraumatic.   Eyes:      Extraocular Movements: Extraocular movements intact.      Conjunctiva/sclera: Conjunctivae normal.      Pupils: Pupils are equal, round, and reactive to light.   Cardiovascular:      Pulses: Normal pulses.      Comments: HR 88  Pulmonary:      Effort: Pulmonary effort is normal. No respiratory distress.   Abdominal:      Comments: Abdomen is soft, epigastric tenderness to palpation, non-distended, non-rigid, no rebound, guarding, masses, no McBurney's point tenderness, no peritoneal signs, negative Rovsing sign, negative Perrin sign.  No CVA tenderness to palpation.   Musculoskeletal:         General: No swelling. Normal range of motion.      Cervical back: Normal range of motion. No rigidity.   Skin:     General: Skin is warm and dry.      Capillary Refill: Capillary refill takes less than 2 seconds.   Neurological:      Mental Status: Alert.       DIAGNOSTIC STUDIES / PROCEDURES    LABS  Mild leukocytosis    RADIOLOGY  I have independently interpreted the diagnostic imaging associated with this visit and am waiting the final reading from the radiologist.   My preliminary interpretation is as follows: No pneumonia, pneumothorax, pulmonary edema  Radiologist interpretation: No pneumonia, pneumothorax, pulmonary edema    COURSE & MEDICAL DECISION MAKING    INITIAL ASSESSMENT, COURSE AND PLAN  Care Narrative: IV fluids for dehydration and dizziness.  Mild leukocytosis, otherwise CMP demonstrates no evidence of acute kidney injury, acute electrolyte abnormality, acute liver failure, CBC demonstrates no evidence of acute anemia.  Coags within normal limits.  Patient failed outpatient management and continued " to have hematemesis.  Due to this I believe patient requires inpatient repeat endoscopy.  Patient high risk from Crohn's disease.  Disposition to the hospital medicine service for admission.    Electronically signed by: Eugenio Kay M.D., 10/23/2023 9:22 PM    Patient has been admitted to the hospital medicine floor.  This dictation has been created using voice recognition software. I am continuously working with the software to minimize the number of voice recognition errors and I have made every attempt to manually correct the errors within my dictation. However errors  related to this voice recognition software may still exist and should be interpreted within the appropriate context.     Electronically signed by: Eugenio Kay M.D., 10/23/2023 10:46 PM      HYDRATION: Based on the patient's presentation of Dehydration the patient was given IV fluids. IV Hydration was used because oral hydration was not adequate alone. Upon recheck following hydration, the patient was improved.        DISPOSITION AND DISCUSSIONS  I have discussed management of the patient with the following physicians and JOSEPH's:  Dr Taylor      FINAL DIAGNOSIS  1. Hematemesis with nausea    2. Epigastric pain           Electronically signed by: Eugenio Kay M.D., 10/23/2023 9:18 PM

## 2023-10-24 NOTE — ANESTHESIA PREPROCEDURE EVALUATION
Case: [id number] Date/Time: 10/24/23 1009    Procedure: GASTROSCOPY (Esophagus)    Anesthesia type: MAC    Pre-op diagnosis: Epigastric Abdominal Pain, Nausea    Location: [facility] ROOM 26 / SURGERY SAME DAY [facility]    Surgeons: Jamison Jones M.D.          Relevant Problems   ANESTHESIA   (positive) Sleep apnea      GI   (positive) Gastroesophageal reflux disease without esophagitis       Physical Exam    Airway   Mallampati: II  TM distance: >3 FB  Neck ROM: full       Cardiovascular - normal exam  Rhythm: regular  Rate: normal  (-) murmur     Dental - normal exam           Pulmonary - normal exam  Breath sounds clear to auscultation     Abdominal   (+) obese     Neurological - normal exam                 Anesthesia Plan    ASA 3       Plan - general       Airway plan will be natural airway          Induction: intravenous    Postoperative Plan: Postoperative administration of opioids is intended.    Pertinent diagnostic labs and testing reviewed    Informed Consent:    Anesthetic plan and risks discussed with patient.    Use of blood products discussed with: patient whom consented to blood products.

## 2023-10-24 NOTE — ANESTHESIA TIME REPORT
Anesthesia Start and Stop Event Times     Date Time Event    10/24/2023 1006 Ready for Procedure     1015 Anesthesia Start     1031 Anesthesia Stop        Responsible Staff  10/24/23    Name Role Begin End    Michael Webb M.D. Anesth 1015 1031        Overtime Reason:  no overtime (within assigned shift)    Comments:

## 2023-11-01 ENCOUNTER — HOSPITAL ENCOUNTER (EMERGENCY)
Facility: MEDICAL CENTER | Age: 23
End: 2023-11-02
Attending: EMERGENCY MEDICINE
Payer: MEDICAID

## 2023-11-01 DIAGNOSIS — R45.851 SUICIDAL IDEATION: ICD-10-CM

## 2023-11-01 PROCEDURE — 99285 EMERGENCY DEPT VISIT HI MDM: CPT

## 2023-11-01 PROCEDURE — 302970 POC BREATHALIZER: Performed by: EMERGENCY MEDICINE

## 2023-11-01 ASSESSMENT — FIBROSIS 4 INDEX: FIB4 SCORE: 0.16

## 2023-11-02 VITALS
TEMPERATURE: 97 F | RESPIRATION RATE: 16 BRPM | BODY MASS INDEX: 46.67 KG/M2 | WEIGHT: 273.37 LBS | HEART RATE: 97 BPM | DIASTOLIC BLOOD PRESSURE: 87 MMHG | HEIGHT: 64 IN | OXYGEN SATURATION: 96 % | SYSTOLIC BLOOD PRESSURE: 141 MMHG

## 2023-11-02 LAB
AMPHET UR QL SCN: NEGATIVE
BARBITURATES UR QL SCN: NEGATIVE
BENZODIAZ UR QL SCN: NEGATIVE
BZE UR QL SCN: NEGATIVE
CANNABINOIDS UR QL SCN: NEGATIVE
FENTANYL UR QL: NEGATIVE
FLUAV RNA SPEC QL NAA+PROBE: NEGATIVE
FLUBV RNA SPEC QL NAA+PROBE: NEGATIVE
HCG UR QL: NEGATIVE
METHADONE UR QL SCN: NEGATIVE
OPIATES UR QL SCN: NEGATIVE
OXYCODONE UR QL SCN: NEGATIVE
PCP UR QL SCN: NEGATIVE
POC BREATHALIZER: 0 PERCENT (ref 0–0.01)
PROPOXYPH UR QL SCN: NEGATIVE
RSV RNA SPEC QL NAA+PROBE: NEGATIVE
SARS-COV-2 RNA RESP QL NAA+PROBE: NOTDETECTED

## 2023-11-02 PROCEDURE — 80307 DRUG TEST PRSMV CHEM ANLYZR: CPT

## 2023-11-02 PROCEDURE — 700102 HCHG RX REV CODE 250 W/ 637 OVERRIDE(OP): Mod: UD | Performed by: EMERGENCY MEDICINE

## 2023-11-02 PROCEDURE — A9270 NON-COVERED ITEM OR SERVICE: HCPCS | Mod: UD | Performed by: EMERGENCY MEDICINE

## 2023-11-02 PROCEDURE — 0241U HCHG SARS-COV-2 COVID-19 NFCT DS RESP RNA 4 TRGT ED POC: CPT

## 2023-11-02 PROCEDURE — 90791 PSYCH DIAGNOSTIC EVALUATION: CPT

## 2023-11-02 PROCEDURE — C9803 HOPD COVID-19 SPEC COLLECT: HCPCS

## 2023-11-02 PROCEDURE — 700111 HCHG RX REV CODE 636 W/ 250 OVERRIDE (IP): Mod: UD | Performed by: EMERGENCY MEDICINE

## 2023-11-02 PROCEDURE — 81025 URINE PREGNANCY TEST: CPT

## 2023-11-02 RX ORDER — ONDANSETRON 4 MG/1
4 TABLET, ORALLY DISINTEGRATING ORAL EVERY 8 HOURS PRN
Status: DISCONTINUED | OUTPATIENT
Start: 2023-11-02 | End: 2023-11-02 | Stop reason: HOSPADM

## 2023-11-02 RX ORDER — PREDNISONE 10 MG/1
5 TABLET ORAL DAILY
Status: DISCONTINUED | OUTPATIENT
Start: 2023-11-03 | End: 2023-11-02 | Stop reason: HOSPADM

## 2023-11-02 RX ORDER — OMEPRAZOLE 20 MG/1
40 CAPSULE, DELAYED RELEASE ORAL DAILY
Status: DISCONTINUED | OUTPATIENT
Start: 2023-11-02 | End: 2023-11-02 | Stop reason: HOSPADM

## 2023-11-02 RX ORDER — PREDNISONE 10 MG/1
10 TABLET ORAL DAILY
Status: DISCONTINUED | OUTPATIENT
Start: 2023-11-02 | End: 2023-11-02

## 2023-11-02 RX ORDER — ACETAMINOPHEN 325 MG/1
650 TABLET ORAL EVERY 6 HOURS PRN
Status: DISCONTINUED | OUTPATIENT
Start: 2023-11-02 | End: 2023-11-02 | Stop reason: HOSPADM

## 2023-11-02 RX ADMIN — OMEPRAZOLE 40 MG: 20 CAPSULE, DELAYED RELEASE ORAL at 08:30

## 2023-11-02 RX ADMIN — ACETAMINOPHEN 650 MG: 325 TABLET, FILM COATED ORAL at 08:31

## 2023-11-02 RX ADMIN — PREDNISONE 10 MG: 10 TABLET ORAL at 08:29

## 2023-11-02 NOTE — ED TRIAGE NOTES
Chief Complaint   Patient presents with    Suicidal Ideation     Plan to OD     Patient RAMESH HERNANDEZ from home for the above complaint. Patient states that she has been having SI for a few days and started to hear whispering voices and saw a figure in the corner. Patient has a history of SI and MDD in 2022.    Protocol ordered.

## 2023-11-02 NOTE — ED NOTES
Pt resting comfortably in [facility]. No acute distress noted. Pt remains under direct observation of 1:1 sitter at all times.
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
## 2023-11-02 NOTE — ED NOTES
Patient resting comfortably, resp even and unlabored, NAD, and no needs at this time.  Patient continues on 1:1 observation with sitter in LOS, room safety complete, checklist in place, and STOP sign posted outside door.

## 2023-11-02 NOTE — DISCHARGE INSTRUCTIONS
You were evaluated today for suicidal ideation. Your physical exam and labs were reassuring. You were medically cleared in the emergency department, had a psychiatric evaluation performed and you are being discharged from the emergency department. Please follow all the recommendations set by your psychiatrist.    If you have thoughts of suicide, please seek help. This may be a family member, friend, mental health professional, suicide hotline, or any emergency department.     National Crisis hotline: 118  Available 24/7    Please return to the ED or seek medical attention if you develop:  Fever, severe headache, vomiting, thoughts of suicide or other concerning findings

## 2023-11-02 NOTE — ED NOTES
Med Rec per Pt's home pharmacy records.  Unknown last doses.  Home Pharmacy:  [facility].    Pt does not take any anticoagulants

## 2023-11-02 NOTE — ED NOTES
Patient sleeping comfortably, resp even and unlabored, NAD, and no needs at this time.  Patient continues on 1:1 observation with sitter in LOS, room safety complete, checklist in place, and STOP sign posted outside door.     Patient belongings placed in locker 14.  Three bags and one cane.

## 2023-11-02 NOTE — ED NOTES
Patient resting comfortably on left side, resp even and unlabored, NAD, and no needs at this time.  Patient continues on 1:1 observation with sitter in LOS, room safety complete, checklist in place, and STOP sign posted outside door.       POC Breathalizer 0.00

## 2023-11-02 NOTE — ED NOTES
Pt ambulated to bathroom steady using personal cane to provide urine specimen. Urine collected and sent to lab. Pt remains under direct observation of 1:1 sitter at all time.

## 2023-11-02 NOTE — ED NOTES
Patient sleeping comfortably, resp even and unlabored, NAD, and no needs at this time.  Patient continues on 1:1 observation with sitter in LOS, room safety complete, checklist in place, and STOP sign posted outside door.

## 2023-11-02 NOTE — PROGRESS NOTES
"ED Observation Progress Note    Date of Service: 11/02/23    Interval History and Interventions  Patient resting comfortably, no new acute complaints.  She is currently on legal hold for suicidal ideation with plan to overdose.    Physical Exam  /71   Pulse 98   Temp 36.7 °C (98.1 °F) (Temporal)   Resp 16   Ht 1.626 m (5' 4\")   Wt 124 kg (273 lb 5.9 oz)   LMP 10/18/2023 (Approximate)   SpO2 95%   BMI 46.92 kg/m² .    Constitutional: Awake and alert. Nontoxic  HENT: Atraumatic  Eyes: No scleral icterus  Cardiovascular: Normal heart rate   Thorax & Lungs: No respiratory distress  Skin:  No cyanosis  Extremities: Well perfused  Psychiatric: Depressed mood    Labs  Results for orders placed or performed during the hospital encounter of 11/01/23   POC BREATHALIZER   Result Value Ref Range    POC Breathalizer 0.00 0.00 - 0.01 Percent       Radiology  No orders to display       Problem List  1.  Suicidal ideation.  Patient remains on psychiatric hold, awaiting transfer to psychiatric facility when bed becomes available.    Electronically signed by: Huan Ayala M.D., 11/2/2023 7:26 AM          "

## 2023-11-02 NOTE — DISCHARGE PLANNING
Alert Team Note:    Contacted by [place], Spoke to Jane. Pt has been accepted PENDING a negative Covid test, infection control form, skin assessment note, and nurse to nurse report. Accepting is Dr. Harrington, facility expects transport at 2030.  Requested form from Alert Team TIERRA Rodriguez, Covid test from Dr. Ayala, and skin note from TIERRA Reyes.

## 2023-11-02 NOTE — ED PROVIDER NOTES
ED Provider Note    Scribed for Dr. Wilks by Steve Nelson. 11/1/2023,  11:42 PM.      CHIEF COMPLAINT  Chief Complaint   Patient presents with    Suicidal Ideation     Plan to OD       EXTERNAL RECORDS REVIEWED  Inpatient Notes The patient was hospitalized on 10/23/2023 for Crohns Disease.     HPI  LIMITATION TO HISTORY   Select: : None  OUTSIDE HISTORIAN(S):  None    Katie Graham is a 23 y.o. female who presents to the Emergency Department via EMS for evaluation of suicidal ideation onset 1 week ago. Associated stomach pain reported at a 6/10. She reports having a baked potato for dinner, noting a difficulty achieving adequate fiber intake due to currently staying at a shelter. The patient reports baseline stomach pain around 2/10, but that pain is exacerbated to a 5/10 with stress or eating. The patient states she has 36 suicide attempts on record but denies any attempts this week. The patient reports she is only on steroid medication once daily at this time with her last dose at 7:42 AM, but denies taking any other medication. The patient denies any existing prescriptions for omeprazole. She reports a medical history of GERD, depression, POTS, mast cell activation syndrome, and anxiety. There are no known alleviating or exacerbating factors.      REVIEW OF SYSTEMS  See HPI for further details. All other systems are negative.     PAST MEDICAL HISTORY     Past Medical History:   Diagnosis Date    Allergy     Apples cause migrains and fainting    Anxiety     Borderline personality disorder (HCC)     Depression     Family history of diabetes mellitus 11/04/2020    GERD (gastroesophageal reflux disease)     Head ache     Mast cell activation syndrome (HCC)     Migraine     POTS (postural orthostatic tachycardia syndrome)     Substance abuse ([facility])     xanax and alcohol in highschool    Suicidal ideation 9/24/2021       SURGICAL HISTORY  Past Surgical History:   Procedure Laterality Date    NV UPPER GI  ENDOSCOPY,DIAGNOSIS N/A 10/24/2023    Procedure: GASTROSCOPY;  Surgeon: Jamison Jones M.D.;  Location: SURGERY SAME DAY [facility];  Service: Gastroenterology    CT UPPER GI ENDOSCOPY,BIOPSY N/A 10/24/2023    Procedure: GASTROSCOPY, WITH BIOPSY;  Surgeon: Jamison Jones M.D.;  Location: SURGERY SAME DAY [facility];  Service: Gastroenterology    CT COLONOSCOPY,DIAGNOSTIC N/A 9/28/2023    Procedure: COLONOSCOPY;  Surgeon: Jamison Jones M.D.;  Location: SURGERY SAME DAY [facility];  Service: Gastroenterology    CT COLONOSCOPY,BIOPSY N/A 9/28/2023    Procedure: COLONOSCOPY, WITH BIOPSY;  Surgeon: Jamison Jones M.D.;  Location: SURGERY SAME DAY [facility];  Service: Gastroenterology    LAPAROSCOPIC UMBILICAL HERNIA REPAIR  2002       FAMILY HISTORY  Family History   Problem Relation Age of Onset    Cancer Mother 54        bone cancer with mets    Diabetes Mother 30        Type 2    Alcohol abuse Mother         sober 18 months    Hypertension Father     Alcohol abuse Father     Kidney Disease Father     Diabetes Sister         type 2    Genetic Disorder Sister         club foot    No Known Problems Sister     No Known Problems Brother     Lung Disease Maternal Aunt     Alcohol abuse Maternal Aunt     Cancer Maternal Grandmother         Lung    Alcohol abuse Maternal Grandmother     Diabetes Maternal Grandfather         type 2    Heart Disease Maternal Grandfather     Hypertension Maternal Grandfather     Hyperlipidemia Maternal Grandfather     Alcohol abuse Maternal Grandfather     Heart Disease Paternal Grandmother     Hypertension Paternal Grandmother     Hyperlipidemia Paternal Grandmother     Alcohol abuse Paternal Grandfather     Cancer Maternal great-grandmother         breast       SOCIAL HISTORY    reports that she has been smoking cigarettes. She started smoking about 5 years ago. She has been smoking an average 0.2 packs per day for the past 0.3 years. She has never used smokeless tobacco. She reports that she does  "not currently use alcohol after a past usage of about 3.0 oz of alcohol per week. She reports that she does not currently use drugs after having used the following drugs: Marijuana and Inhaled.    CURRENT MEDICATIONS  Home Medications       Reviewed by Jacques Hoff R.N. (Registered Nurse) on 11/01/23 at 2320  Med List Status: <None>     Medication Last Dose Status   omeprazole (PRILOSEC) 20 MG delayed-release capsule  Active   omeprazole (PRILOSEC) 40 MG delayed-release capsule  Active   ondansetron (ZOFRAN ODT) 4 MG TABLET DISPERSIBLE  Active   predniSONE (DELTASONE) 10 MG Tab  Active                    ALLERGIES  Allergies   Allergen Reactions    [place] Hives and Rash    Latex     Sulfa Drugs Hives     Pt states \"I don't know what happens when I have it, my mom just always told me I was allergic to it and I remember being hospitalized for it when I was a kid\"       PHYSICAL EXAM  VITAL SIGNS: Ht 1.626 m (5' 4\")   Wt 124 kg (273 lb 5.9 oz)   LMP 10/18/2023 (Approximate)   BMI 46.92 kg/m²   Gen: Alert, no acute distress  HEENT: ATNC  Eyes: PERRL, EOMI, normal conjunctiva  Neck: trachea midline  Resp: no respiratory distress  CV: No JVD, regular rate and rhythm  Abd: Abdomen soft, minimal left upper quadrant tenderness, non-distended  Ext: No deformities  Neuro: speech fluent    DIAGNOSTIC STUDIES / PROCEDURES    LABS  Labs Reviewed   POC BREATHALIZER - Normal   URINE DRUG SCREEN   HCG QUALITATIVE UR     COURSE & MEDICAL DECISION MAKING  Pertinent Labs & Imaging studies were reviewed. (See chart for details)    ED Observation Status?  Yes.  Patient meets observation status for diagnostic uncertainty, therapeutic intensity  Patient admitted to ED observation 11/2/2023 at 0033    11:42 PM Patient seen and examined at bedside. Patient is a 23 year old female who presents today for evaluation of suicidal ideation onset 1 week ago. See HPI for further details.         INITIAL ASSESSMENT AND PLAN  Medical " Decision Making: Patient presents with suicidal ideation.  She reports a history of the same.  Recently was seen for Crohn's disease, will continue her home medication.  No evidence of acute abdominal pathology, tolerating oral intake, reassuring vital signs, nonsurgical abdominal exam.  No evidence of toxidrome.  Patient placed on legal hold.  She is placed in observation for evaluation by the mental health team, likely psychiatric placement.    Should the patient still be in the department at the time of my sign out they will be assigned to one of my partners to assure that appropriate disposition to a psych facility is made. If there is additional need for medicine, nursing staff will speak with my partner regarding administration of medication.    ADDITIONAL PROBLEM LIST AND DISPOSITION    I have discussed management of the patient with the following medical professionals: Behavioral health    Escalation of care considered, and ultimately not performed: blood analysis and diagnostic imaging.     Barriers to care at this time, including but not limited to:  None noted at this time .     Decision tools and prescription drugs considered including, but not limited to:       FINAL IMPRESSION  1. Suicidal ideation         ISteve (Benedict), am scribing for, and in the presence of, Joseph Wilks M.D..    Electronically signed by: Steve Nelson (Benedict), 11/1/2023    IJoseph M.D. personally performed the services described in this documentation, as scribed by Steve Nelson in my presence, and it is both accurate and complete.    The note accurately reflects work and decisions made by me.  Joseph Wilks M.D.  11/2/2023  12:34 AM      This dictation was created using voice recognition software. The accuracy of the dictation is limited to the abilities of the software. I expect there may be some errors of grammar and possibly content. The nursing notes were reviewed and certain aspects of this information  were incorporated into this note.

## 2023-11-02 NOTE — ED NOTES
Bedside report received from off going RN/tech: TIERRA Mcginnis, assumed care of patient.  POC discussed with patient. Call light within reach, all needs addressed at this time.       Fall risk interventions in place: Move the patient closer to the nurse's station, Patient's personal possessions are with in their safe reach, Place socks on patient, Place fall risk sign on patient's door, Keep floor surfaces clean and dry, Accompanied to restroom, and Human-sitter if tele-sitter fails (all applicable per [place] Fall risk assessment)   Continuous monitoring: Not Applicable   IVF/IV medications: Not Applicable   Oxygen: Room Air, no O2 tank in room  Bedside sitter: Pt on L2k SI with 1:1 sitter Sean (name), Report given to sitter, and checklist completed, stop sign in doorway  Isolation: Not Applicable

## 2023-11-02 NOTE — ED NOTES
Pt medicated per MAR. Vital signs taken. Provided pt with water. Notified pt a need for urine sample, pt verbalized understanding.    Spoke with patient wife-informed for patient to continue same dose warfarin and repeat INR 2 weeks

## 2023-11-02 NOTE — ED NOTES
Bilateral L3-5 Lumbar Medial Branch Blockade  [facility]      PREOPERATIVE DIAGNOSIS:  Lumbar spondylosis without myelopathy    POSTOPERATIVE DIAGNOSIS:  Lumbar spondylosis without myelopathy    PROCEDURE:   Diagnostic Bilateral Lumbar Medial Branch Nerve Blockades, with fluoroscopy:  L3, L4, and L5 nerves (at the L4 & L5 transverse processes and the sacral alar groove) to block facet joints L4-5, and L5-S1  73422-56 -- Bilateral Lumbar Facet blocks, 1st Level  00616-81 -- Bilateral Lumbar Facet blocks, 2nd  Level    PRE-PROCEDURE DISCUSSION WITH PATIENT:    Risks and complications were discussed with the patient prior to starting the procedure and informed consent was obtained.      SURGEON:   Lemuel Hallman MD    REASON FOR PROCEDURE:    The patient complains of pain that seems to have a significant axial component, Increased back pain on range of motion exams, Pain on extension of the lumbar spine, and Positive lumbar facet loading maneuver    SEDATION:  Patient declined administration of moderate sedation    ANESTHETIC:  Marcaine 0.25%  STEROID:  Methylprednisolone (DEPO MEDROL) 40mg/ml  TOTAL VOLUME OF SOLUTION:  6ml    DESCRIPTON OF PROCEDURE:  After obtaining informed consent, IV access was not obtained in the preoperative area.   The patient was taken to the operating room.  The patient was placed in the prone position with a pillow under the abdomen. All pressure points were well padded.  The lumbosacral area was prepped with Chloraprep and draped in a sterile fashion. Under fluoroscopic guidance the transverse processes of the L4 and L5 vertebrae at the junctions of the superior articular processes were identified on the right. Also identified was the groove between the ala and the superior articular process of the sacrum on the ipsilateral side.  Skin and subcutaneous tissue were anesthetized with 1% lidocaine above each of these points. A 25-gauge spinal needle was introduced under  Patient resting comfortably, resp even and unlabored, NAD, and no needs at this time.  Patient continues on 1:1 observation with sitter in LOS, room safety complete, checklist in place, and STOP sign posted outside door.    fluoroscopic guidance at the above junctions. Aspiration was negative for blood and CSF.  After confirming the position of the needle with fluoroscope in all views, 0.25 mL of Omnipaque was injected, and after seeing the proper spread a total of 1 mL of the anesthetic solution noted above was injected at each of these points.  Needles were removed intact from each of the areas.  A similar procedure was repeated to block the L3, L4, and L5 nerves on the contralateral side.   Onset of analgesia was noted.  Vital signs remained stable throughout.      ESTIMATED BLOOD LOSS:  <5 mL  SPECIMENS:  none    COMPLICATIONS:   No complications were noted., There was no indication of vascular uptake on live injection of contrast dye., There was no indication of intrathecal uptake on live injection of contrast dye., and There was not any evidence of dural puncture.      TOLERANCE & DISCHARGE CONDITION:    The patient tolerated the procedure well.  The patient was transported to the recovery area without difficulties.  The patient was discharged to home under the care of family in stable and satisfactory condition.    PLAN OF CARE:  The patient was given our standard instruction sheet.  We discussed that Lumbar Medial Branch Blockade is a diagnostic procedure in consideration for radiofrequency ablation if two diagnostic procedures prove to be positive for significant benefit.  If sustained relief of 6 to eight weeks is obtained, then an alternative plan could be therapeutic lumbar branch blockades.  The patient is asked to keep a pain log each hour for 8 hours after the procedure today.  The patient will  Return to clinic 4-6 wks.  The patient will resume all medications as per the medication reconciliation sheet.

## 2023-11-02 NOTE — ED NOTES
Bedside report from TIERRA [place]    Pt is resting in bed.  Call light within reach. Gurney in low position    Cardiac monitor: No  Pulse ox: No  Fall risk: Low  PIV: None  Oxygen: Room air  Sitter: 1:1 in direct view  Legal Hold: Yes

## 2023-11-02 NOTE — DISCHARGE PLANNING
[facility] ALERT TEAM DISCHARGE PLANNING NOTE    Date:  11/2/23  Patient Name:  Katie Graham - 23 y.o. - Discharge Planning  MRN:  [id number]   YOB: 2000  ADMISSION DATE:  11/1/2023     Writer forwarded referral packet for inpatient psychiatric care to the following community providers:  [facility], [facility]    Items included in the referral packet:   __x___Face Sheet   __x___Pages 1 and 2 of completed legal hold   _____Alert Team/Psych Assessment   __x___H&P   _____UDS   __x___Blood Alcohol   __x___Vital signs   __x___Pregnancy Test (if applicable)   __x___Medications List   _____Covid Screen

## 2023-11-02 NOTE — ED NOTES
Patient's home medications have been reviewed by the pharmacy team.   - Pt presented via EMS for suicidal ideation that started 1 week ago. Pt also reported baseline stomach pain for which she has a history of Crohns disease. She is currently on a steroid taper for her Crohns.    Past Medical History:   Diagnosis Date    Allergy     Apples cause migrains and fainting    Anxiety     Borderline personality disorder (HCC)     Depression     Family history of diabetes mellitus 11/04/2020    GERD (gastroesophageal reflux disease)     Head ache     Mast cell activation syndrome (HCC)     Migraine     POTS (postural orthostatic tachycardia syndrome)     Substance abuse ([facility])     xanax and alcohol in highschool    Suicidal ideation 9/24/2021       Patient's Medications   New Prescriptions    No medications on file   Previous Medications    OMEPRAZOLE (PRILOSEC) 20 MG DELAYED-RELEASE CAPSULE    Take 1 Capsule by mouth every day. Take after completing the 40 mg strength    OMEPRAZOLE (PRILOSEC) 40 MG DELAYED-RELEASE CAPSULE    Take 1 Capsule (40 mg) by mouth every day for 28 days, then take 20 mg daily    ONDANSETRON (ZOFRAN ODT) 4 MG TABLET DISPERSIBLE    Take 1 Tablet by mouth every 6 hours as needed for Nausea/Vomiting.    PREDNISONE (DELTASONE) 10 MG TAB    Take 4 Tablets by mouth every day for 7 days, THEN 3 Tablets every day for 7 days, THEN 2 Tablets every day for 7 days, THEN 1 Tablet every day for 7 days, THEN 0.5 Tablets every day for 7 days. (Please call pharmacy for refills and continuation of therapy)   Modified Medications    No medications on file   Discontinued Medications    No medications on file          A:  Medications do not appear to be contributing to current complaints.       P:    1. Pt started on a prednisone taper due to her Crohns disease during a previous admission. Patient started taper on 10/4/23 for a total of 5 weeks. Pt should currently be in her last week of taper which is 5 mg. Discussed  changing dosing to match the prescribed taper. Physician agreed to change.    Rico Pond PharmD      ---------------------------------------------------------------------------------------------------------------------    ADDENDUM:     I have discussed the case with Danyel and agree with above assessment and plan.     Edward DubonD, [facility] Hospital[facility]

## 2023-11-03 NOTE — ED NOTES
Beside report from Amy MAYORGA.  L2K in place, paper scrubs in use, all belongings removed, and stop sign displayed.  Pt report to 1:1 sitter at bedside. Safety checklist reviewed and signed.      Pt scheduled for transport to [facility] at 2030.

## 2023-11-03 NOTE — ED NOTES
Bedside report to TIERRA Jay    Pt is resting in bed with unlabored breathing.  GCS 15 alert and orientedx4.    Cardiac monitor: No  Pulse ox: No  Oxygen: Room air  Fall risk: NO  PIV: No  Legal hold: Yes  Sitter: 1:1 in direct view

## 2023-11-03 NOTE — DISCHARGE PLANNING
Alert Team Note:    Faxed RN skin note to [facility]

## 2023-11-03 NOTE — DISCHARGE PLANNING
Legal Hold Transfer     Referral: Legal hold transfer to Mental Health Facility     Intervention: Informed by Jane at [facility] that pt has been accepted     Pt's accepting physcian is Dr. Harrington     Transport arranged through Keiko at [facility] to [facility] with [facility]     The pt will be picked up at 2030      Notified Bedside RN Amy, Alert Team TIERRA Rashid, and Dr. Gansert of the departure time as well as accepting facility.      Transfer packet to be created with original legal hold and placed on chart by Alert Team RN.      Plan: Pt will be transferring to [facility] at 2030

## 2023-11-03 NOTE — ED NOTES
Pt is resting in bed.  Call light within reach. Gurney in low position    Cardiac monitor: No  Pulse ox: No  Fall risk: Low  PIV: None  Oxygen: Room air  Sitter: 1:1 in direct view  Legal Hold: Yes

## 2023-11-03 NOTE — ED NOTES
Report to [facility], provided with transport packet and pt discharge instructions. Pt transported to [facility], all belongings transported with pt.

## 2023-11-07 PROCEDURE — RXMED WILLOW AMBULATORY MEDICATION CHARGE: Performed by: PSYCHIATRY & NEUROLOGY

## 2023-11-09 ENCOUNTER — APPOINTMENT (OUTPATIENT)
Dept: INTERNAL MEDICINE | Facility: OTHER | Age: 23
End: 2023-11-09
Payer: MEDICAID

## 2023-11-09 ENCOUNTER — PHARMACY VISIT (OUTPATIENT)
Dept: PHARMACY | Facility: MEDICAL CENTER | Age: 23
End: 2023-11-09
Payer: COMMERCIAL

## 2023-11-13 ENCOUNTER — APPOINTMENT (OUTPATIENT)
Dept: RADIOLOGY | Facility: MEDICAL CENTER | Age: 23
End: 2023-11-13
Attending: STUDENT IN AN ORGANIZED HEALTH CARE EDUCATION/TRAINING PROGRAM
Payer: MEDICAID

## 2023-11-13 ENCOUNTER — HOSPITAL ENCOUNTER (EMERGENCY)
Facility: MEDICAL CENTER | Age: 23
End: 2023-11-13
Attending: STUDENT IN AN ORGANIZED HEALTH CARE EDUCATION/TRAINING PROGRAM
Payer: MEDICAID

## 2023-11-13 VITALS
HEIGHT: 64 IN | BODY MASS INDEX: 48.21 KG/M2 | SYSTOLIC BLOOD PRESSURE: 126 MMHG | DIASTOLIC BLOOD PRESSURE: 73 MMHG | HEART RATE: 74 BPM | TEMPERATURE: 98.2 F | OXYGEN SATURATION: 99 % | WEIGHT: 282.41 LBS | RESPIRATION RATE: 17 BRPM

## 2023-11-13 DIAGNOSIS — R10.2 PELVIC PAIN: ICD-10-CM

## 2023-11-13 DIAGNOSIS — B37.31 VAGINAL CANDIDIASIS: ICD-10-CM

## 2023-11-13 LAB
APPEARANCE UR: ABNORMAL
BACTERIA #/AREA URNS HPF: ABNORMAL /HPF
BILIRUB UR QL STRIP.AUTO: NEGATIVE
COLOR UR: YELLOW
EPI CELLS #/AREA URNS HPF: ABNORMAL /HPF
GLUCOSE UR STRIP.AUTO-MCNC: NEGATIVE MG/DL
HCG UR QL: NEGATIVE
HYALINE CASTS #/AREA URNS LPF: ABNORMAL /LPF
KETONES UR STRIP.AUTO-MCNC: ABNORMAL MG/DL
LEUKOCYTE ESTERASE UR QL STRIP.AUTO: NEGATIVE
MICRO URNS: ABNORMAL
NITRITE UR QL STRIP.AUTO: NEGATIVE
PH UR STRIP.AUTO: 6 [PH] (ref 5–8)
PROT UR QL STRIP: NEGATIVE MG/DL
RBC # URNS HPF: ABNORMAL /HPF
RBC UR QL AUTO: NEGATIVE
SP GR UR STRIP.AUTO: 1.03
UROBILINOGEN UR STRIP.AUTO-MCNC: 0.2 MG/DL
WBC #/AREA URNS HPF: ABNORMAL /HPF
YEAST BUDDING URNS QL: PRESENT /HPF

## 2023-11-13 PROCEDURE — 87660 TRICHOMONAS VAGIN DIR PROBE: CPT

## 2023-11-13 PROCEDURE — 99284 EMERGENCY DEPT VISIT MOD MDM: CPT

## 2023-11-13 PROCEDURE — 76830 TRANSVAGINAL US NON-OB: CPT

## 2023-11-13 PROCEDURE — 87591 N.GONORRHOEAE DNA AMP PROB: CPT

## 2023-11-13 PROCEDURE — 87510 GARDNER VAG DNA DIR PROBE: CPT

## 2023-11-13 PROCEDURE — 73502 X-RAY EXAM HIP UNI 2-3 VIEWS: CPT | Mod: LT

## 2023-11-13 PROCEDURE — 87480 CANDIDA DNA DIR PROBE: CPT

## 2023-11-13 PROCEDURE — 700102 HCHG RX REV CODE 250 W/ 637 OVERRIDE(OP): Mod: UD | Performed by: STUDENT IN AN ORGANIZED HEALTH CARE EDUCATION/TRAINING PROGRAM

## 2023-11-13 PROCEDURE — 87491 CHLMYD TRACH DNA AMP PROBE: CPT

## 2023-11-13 PROCEDURE — 73610 X-RAY EXAM OF ANKLE: CPT | Mod: LT

## 2023-11-13 PROCEDURE — A9270 NON-COVERED ITEM OR SERVICE: HCPCS | Mod: UD | Performed by: STUDENT IN AN ORGANIZED HEALTH CARE EDUCATION/TRAINING PROGRAM

## 2023-11-13 PROCEDURE — 81001 URINALYSIS AUTO W/SCOPE: CPT

## 2023-11-13 PROCEDURE — 81025 URINE PREGNANCY TEST: CPT

## 2023-11-13 RX ORDER — OXYCODONE HYDROCHLORIDE AND ACETAMINOPHEN 5; 325 MG/1; MG/1
1 TABLET ORAL ONCE
Status: COMPLETED | OUTPATIENT
Start: 2023-11-13 | End: 2023-11-13

## 2023-11-13 RX ORDER — FLUCONAZOLE 200 MG/1
200 TABLET ORAL ONCE
Status: COMPLETED | OUTPATIENT
Start: 2023-11-13 | End: 2023-11-13

## 2023-11-13 RX ADMIN — OXYCODONE AND ACETAMINOPHEN 1 TABLET: 5; 325 TABLET ORAL at 18:32

## 2023-11-13 RX ADMIN — FLUCONAZOLE 200 MG: 200 TABLET ORAL at 22:10

## 2023-11-13 ASSESSMENT — FIBROSIS 4 INDEX: FIB4 SCORE: 0.14

## 2023-11-14 LAB
C TRACH DNA GENITAL QL NAA+PROBE: NEGATIVE
CANDIDA DNA VAG QL PROBE+SIG AMP: POSITIVE
G VAGINALIS DNA VAG QL PROBE+SIG AMP: NEGATIVE
N GONORRHOEA DNA GENITAL QL NAA+PROBE: NEGATIVE
SPECIMEN SOURCE: NORMAL
T VAGINALIS DNA VAG QL PROBE+SIG AMP: NEGATIVE

## 2023-11-14 NOTE — ED TRIAGE NOTES
Chief Complaint   Patient presents with    Pelvic Pain     Sudden onset pelvic pain. Reports sharp, stabbing pain to the left side of her pelvis, now radiating to the right.   Hx Crohns      States 4/10 pain. Patient ambulatory to triage for above complaint. Patient A&Ox4, GCS 15, patient speaking in full sentences. Equal and unlabored respirations. Patient educated on triage process and encouraged to notify staff if condition worsens. Appropriate protocols ordered. Patient returned to the lobby in stable condition.

## 2023-11-14 NOTE — ED NOTES
Pt discharged to home. Discharge paperwork provided. Education provided by ERP. Reinforced discharge instructions.  Pt was given follow up instructions.  Pt verbalized understanding of all instructions for discharge.   Patient went out of the ER ambulatory with steady gait., alert and oriented x 4, with all belongings.

## 2023-11-14 NOTE — DISCHARGE INSTRUCTIONS
You had signs of a vaginal yeast infection.  We gave you treatment for this.  Your x-rays and ultrasound were unremarkable.  You should follow-up with your primary care provider.  You have uncontrolled pain, vomiting, fever you should return to the emergency department.  You can use ibuprofen every 6 hours as needed for pain.

## 2023-11-14 NOTE — ED PROVIDER NOTES
ED Provider Note    CHIEF COMPLAINT  Chief Complaint   Patient presents with    Pelvic Pain     Sudden onset pelvic pain. Reports sharp, stabbing pain to the left side of her pelvis, now radiating to the right.   Hx Crohns        EXTERNAL RECORDS REVIEWED  Outpatient Notes ED note from 11/2023 where patient was evaluated for suicidal ideation    HPI/ROS  LIMITATION TO HISTORY     OUTSIDE HISTORIAN(S):      Katie Graham is a 23 y.o. female who presents with left-sided hip and abdominal pain.  Patient says that she frequently has episodes of lightheadedness had 1 of these episodes today and fell onto her left side.  Patient says that since that time she has been having pain in her left hip left lower quadrant describes it as sharp with associated nausea.  Patient also says she has had pain in her left ankle since the fall.  Patient denies other recent illness including fever, chills, chest pain, shortness of breath, vomiting, diarrhea, vaginal bleeding or discharge, urinary changes.    PAST MEDICAL HISTORY   has a past medical history of Allergy, Anxiety, Borderline personality disorder (HCC), Depression, Family history of diabetes mellitus (11/04/2020), GERD (gastroesophageal reflux disease), Head ache, Mast cell activation syndrome ([facility]), Migraine, POTS (postural orthostatic tachycardia syndrome), Substance abuse ([facility]), and Suicidal ideation (9/24/2021).    SURGICAL HISTORY   has a past surgical history that includes laparoscopic umbilical hernia repair (2002); colonoscopy,diagnostic (N/A, 9/28/2023); colonoscopy,biopsy (N/A, 9/28/2023); upper gi endoscopy,diagnosis (N/A, 10/24/2023); and upper gi endoscopy,biopsy (N/A, 10/24/2023).    FAMILY HISTORY  Family History   Problem Relation Age of Onset    Cancer Mother 54        bone cancer with mets    Diabetes Mother 30        Type 2    Alcohol abuse Mother         sober 18 months    Hypertension Father     Alcohol abuse Father     Kidney Disease Father     Diabetes  Sister         type 2    Genetic Disorder Sister         club foot    No Known Problems Sister     No Known Problems Brother     Lung Disease Maternal Aunt     Alcohol abuse Maternal Aunt     Cancer Maternal Grandmother         Lung    Alcohol abuse Maternal Grandmother     Diabetes Maternal Grandfather         type 2    Heart Disease Maternal Grandfather     Hypertension Maternal Grandfather     Hyperlipidemia Maternal Grandfather     Alcohol abuse Maternal Grandfather     Heart Disease Paternal Grandmother     Hypertension Paternal Grandmother     Hyperlipidemia Paternal Grandmother     Alcohol abuse Paternal Grandfather     Cancer Maternal great-grandmother         breast       SOCIAL HISTORY  Social History     Tobacco Use    Smoking status: Every Day     Current packs/day: 0.50     Average packs/day: 0.2 packs/day for 0.3 years (0.1 ttl pk-yrs)     Types: Cigarettes     Start date: 8/14/2018     Last attempt to quit: 11/4/2018    Smokeless tobacco: Never    Tobacco comments:     Quit after 3 months   Vaping Use    Vaping Use: Former    Substances: Nicotine, Flavoring, 0 anali     Devices: Refillable tank   Substance and Sexual Activity    Alcohol use: Yes     Alcohol/week: 3.0 oz     Types: 5 Shots of liquor per week     Comment: occ.    Drug use: Not Currently     Types: Marijuana, Inhaled     Comment: past hx of using multiple drugs in high school    Sexual activity: Not Currently     Partners: Female, Male     Birth control/protection: Condom Male       CURRENT MEDICATIONS  Home Medications       Reviewed by Radha Licona RJacksonNJackson (Registered Nurse) on 11/13/23 at 1618  Med List Status: Partial     Medication Last Dose Status   doxepin (SINEQUAN) 25 MG Cap  Active   fluvoxamine (LUVOX) 25 MG tablet  Active   omeprazole (PRILOSEC) 20 MG delayed-release capsule  Active   omeprazole (PRILOSEC) 40 MG delayed-release capsule  Active   ondansetron (ZOFRAN ODT) 4 MG TABLET DISPERSIBLE  Active   pantoprazole  "(PROTONIX) 40 MG Tablet Delayed Response  Active   propranolol (INDERAL) 10 MG Tab  Active   ziprasidone (GEODON) 40 MG Cap  Active                    ALLERGIES  Allergies   Allergen Reactions    Punica Hives and Rash    Coconut (Cocos Nucifera) Allergy Skin Test     Gluten Meal      celiac    Lactose     Latex     Tomato     Sulfa Drugs Hives     Pt states \"I don't know what happens when I have it, my mom just always told me I was allergic to it and I remember being hospitalized for it when I was a kid\"       PHYSICAL EXAM  VITAL SIGNS: /73   Pulse 74   Temp 36.8 °C (98.2 °F) (Temporal)   Resp 17   Ht 1.626 m (5' 4\")   Wt (!) 128 kg (282 lb 6.6 oz)   LMP 10/18/2023 (Approximate)   SpO2 99%   BMI 48.48 kg/m²    Constitutional: Alert in no apparent distress.  HENT: No signs of trauma, Bilateral external ears normal, Nose normal.   Eyes: Pupils are equal and reactive, Conjunctiva normal, Non-icteric.   Neck: Normal range of motion, No tenderness, Supple, No stridor.   Cardiovascular: Regular rate and rhythm, no murmurs.   Thorax & Lungs: Normal breath sounds, No respiratory distress, No wheezing, No chest tenderness.   Abdomen: Bowel sounds normal, Soft, left lower quadrant tenderness, no rebound tenderness or guarding, No masses, No pulsatile masses.   Skin: Warm, Dry, No erythema, No rash.   Back: No bony tenderness, No CVA tenderness.   Extremities: Intact distal pulses, No edema, No tenderness, No cyanosis  Musculoskeletal: Good range of motion in all major joints.  Mild tenderness to left hip and ankle no significant deformity or swelling  Neurologic: Alert , Normal motor function, Normal sensory function, No focal deficits noted.   Psychiatric: Affect normal, Judgment normal, Mood normal.    DIAGNOSTIC STUDIES / PROCEDURES  EKG      LABS  Labs Reviewed   URINALYSIS - Abnormal; Notable for the following components:       Result Value    Character Cloudy (*)     Ketones Trace (*)     All other " components within normal limits    Narrative:     Release to patient->Immediate   URINE MICROSCOPIC (W/UA) - Abnormal; Notable for the following components:    WBC 5-10 (*)     Bacteria Many (*)     Epithelial Cells Moderate (*)     Budding Yeast Present (*)     All other components within normal limits    Narrative:     Release to patient->Immediate   HCG QUALITATIVE UR    Narrative:     Release to patient->Immediate   CHLAMYDIA/GC, PCR (GENITAL/ANAL SWAB)    Narrative:     Release to patient->Immediate   BACTERIAL VAGINOSIS/VAGINITIS PANEL    Narrative:     Release to patient->Immediate         RADIOLOGY  I have independently interpreted the diagnostic imaging associated with this visit and am waiting the final reading from the radiologist.   My preliminary interpretation is as follows: No fracture or dislocation of left hip or ankle  Radiologist interpretation:   US-PELVIC COMPLETE (TRANSABDOMINAL/TRANSVAGINAL) (COMBO)   Final Result      1.  Normal transvaginal appearance of the pelvis.      DX-ANKLE 3+ VIEWS LEFT   Final Result      No radiographic evidence of acute traumatic injury.      DX-HIP-COMPLETE - UNILATERAL 2+ LEFT   Final Result      Normal radiographic examination of the pelvis and left hip.            COURSE & MEDICAL DECISION MAKING    ED Observation Status? Yes; I am placing the patient in to an observation status due to a diagnostic uncertainty as well as therapeutic intensity. Patient placed in observation status at 6:20 PM, 11/13/2023.     Observation plan is as follows: Monitor response to analgesics and ability to ambulate and tolerate p.o.    Upon Reevaluation, the patient's condition has: Improved; and will be discharged.    Patient discharged from ED Observation status at 2230 (Time) 11/13/23 (Date).     INITIAL ASSESSMENT, COURSE AND PLAN  Care Narrative: Regarding patient's lightheadedness she has history of POTS and frequent episodes of lightheadedness no signs of heart failure low  suspicion for PE, cardiac dysrhythmia.  Lightness this was after standing.  Regarding patient's left lower quadrant pain differential includes fracture, dislocation, ovarian torsion.  Will obtain x-rays of left hip and ankle.  Will obtain pregnancy test and likely ultrasound.    Pelvic exam without signs of PID, there are signs of vaginal candidiasis.  Patient given dose of Diflucan.  Urinalysis appears to be contaminated will await culture results, patient without urinary symptoms.  Ultrasound obtained which shows no pathology normal flow to ovaries.  Patient without severe pain or vomiting low suspicion for ovarian torsion.  Patient is able to ambulate and tolerate p.o. without difficulty.  Discussed with patient return precautions, supportive care at home and PCP follow-up.      ADDITIONAL PROBLEM LIST    DISPOSITION AND DISCUSSIONS    Barriers to care at this time, including but not limited to: Patient lacks financial resources.       FINAL DIAGNOSIS  1. Pelvic pain    2. Vaginal candidiasis           Electronically signed by: Cosmo Trotter D.O., 11/13/2023 6:27 PM

## 2023-11-17 NOTE — ED NOTES
"ED Positive Culture Follow-up/Notification Note:    Date: 11/16/23     Patient seen in the ED on 11/13/2023 for evaluation of left-sided hip and abdominal pain. Per ED provider note, \"Patient says that she frequently has episodes of lightheadedness had 1 of these episodes today and fell onto her left side.  Patient says that since that time she has been having pain in her left hip left lower quadrant describes it as sharp with associated nausea.  Patient also says she has had pain in her left ankle since the fall.  Patient denies other recent illness including fever, chills, chest pain, shortness of breath, vomiting, diarrhea, vaginal bleeding or discharge, urinary changes.\"  Physical exam  Abdomen: Bowel sounds normal, Soft, left lower quadrant tenderness, no rebound tenderness or guarding, No masses, No pulsatile masses.   US-Pelvic  1.  Normal transvaginal appearance of the pelvis.  DX-Ankle  No radiographic evidence of acute traumatic injury.   DX-Hip  Normal radiographic examination of the pelvis and left hip.   Patient given fluconazole for suspected vaginal candidiasis as well as return precautions.  1. Pelvic pain    2. Vaginal candidiasis       Discharge Medication List as of 11/13/2023 10:29 PM          Allergies: Punica, Coconut (cocos nucifera) allergy skin test, Gluten meal, Lactose, Latex, Tomato, and Sulfa drugs     Vitals:    11/13/23 1613 11/13/23 1614 11/13/23 2128 11/13/23 2200   BP: (!) 150/97  126/73 126/73   Pulse:   69 74   Resp:   16 17   Temp:   36.8 °C (98.2 °F)    TempSrc:   Temporal    SpO2:   96% 99%   Weight:  (!) 128 kg (282 lb 6.6 oz)     Height:  1.626 m (5' 4\")         Final cultures:   Results       Procedure Component Value Units Date/Time    Chlamydia/GC, PCR (Genital/Anal swab) [[id number]] Collected: 11/13/23 2145    Order Status: Completed Specimen: Genital Updated: 11/14/23 1633     C. trachomatis by PCR Negative     N. gonorrhoeae by PCR Negative     Source Vaginal    " Narrative:      Release to patient->Immediate    URINALYSIS (UA) [[id number]]  (Abnormal) Collected: 11/13/23 1934    Order Status: Completed Specimen: Urine Updated: 11/2002     Color Yellow     Character Cloudy     Specific Gravity 1.031     Ph 6.0     Glucose Negative mg/dL      Ketones Trace mg/dL      Protein Negative mg/dL      Bilirubin Negative     Urobilinogen, Urine 0.2     Nitrite Negative     Leukocyte Esterase Negative     Occult Blood Negative     Micro Urine Req Microscopic    Narrative:      Release to patient->Immediate    WET PREP [[id number]] Collected: 11/13/23 0000    Order Status: Canceled Specimen: Cervical             Plan:   Vaginal swab positive for Candida species. Appropriate antibiotic therapy prescribed. No changes required based upon culture result.    Jim Paul, PharmD

## 2023-11-23 ENCOUNTER — APPOINTMENT (OUTPATIENT)
Dept: RADIOLOGY | Facility: MEDICAL CENTER | Age: 23
End: 2023-11-23
Attending: STUDENT IN AN ORGANIZED HEALTH CARE EDUCATION/TRAINING PROGRAM
Payer: MEDICAID

## 2023-11-23 ENCOUNTER — HOSPITAL ENCOUNTER (EMERGENCY)
Facility: MEDICAL CENTER | Age: 23
End: 2023-11-23
Attending: STUDENT IN AN ORGANIZED HEALTH CARE EDUCATION/TRAINING PROGRAM
Payer: MEDICAID

## 2023-11-23 VITALS
DIASTOLIC BLOOD PRESSURE: 76 MMHG | BODY MASS INDEX: 46.1 KG/M2 | SYSTOLIC BLOOD PRESSURE: 124 MMHG | OXYGEN SATURATION: 95 % | HEART RATE: 93 BPM | TEMPERATURE: 97.6 F | HEIGHT: 64 IN | WEIGHT: 270 LBS | RESPIRATION RATE: 16 BRPM

## 2023-11-23 DIAGNOSIS — S06.0XAA CONCUSSION WITH UNKNOWN LOSS OF CONSCIOUSNESS STATUS, INITIAL ENCOUNTER: ICD-10-CM

## 2023-11-23 DIAGNOSIS — R11.2 NAUSEA AND VOMITING, UNSPECIFIED VOMITING TYPE: ICD-10-CM

## 2023-11-23 DIAGNOSIS — S09.90XA CLOSED HEAD INJURY, INITIAL ENCOUNTER: ICD-10-CM

## 2023-11-23 LAB — EKG IMPRESSION: NORMAL

## 2023-11-23 PROCEDURE — 99284 EMERGENCY DEPT VISIT MOD MDM: CPT

## 2023-11-23 PROCEDURE — A9270 NON-COVERED ITEM OR SERVICE: HCPCS | Mod: UD | Performed by: STUDENT IN AN ORGANIZED HEALTH CARE EDUCATION/TRAINING PROGRAM

## 2023-11-23 PROCEDURE — 700111 HCHG RX REV CODE 636 W/ 250 OVERRIDE (IP): Mod: UD | Performed by: STUDENT IN AN ORGANIZED HEALTH CARE EDUCATION/TRAINING PROGRAM

## 2023-11-23 PROCEDURE — 700102 HCHG RX REV CODE 250 W/ 637 OVERRIDE(OP): Mod: UD | Performed by: STUDENT IN AN ORGANIZED HEALTH CARE EDUCATION/TRAINING PROGRAM

## 2023-11-23 PROCEDURE — 93005 ELECTROCARDIOGRAM TRACING: CPT | Performed by: STUDENT IN AN ORGANIZED HEALTH CARE EDUCATION/TRAINING PROGRAM

## 2023-11-23 PROCEDURE — 70450 CT HEAD/BRAIN W/O DYE: CPT

## 2023-11-23 PROCEDURE — 93005 ELECTROCARDIOGRAM TRACING: CPT

## 2023-11-23 RX ORDER — ONDANSETRON 4 MG/1
4 TABLET, ORALLY DISINTEGRATING ORAL EVERY 6 HOURS PRN
Qty: 30 TABLET | Refills: 0 | Status: SHIPPED | OUTPATIENT
Start: 2023-11-23 | End: 2023-12-18

## 2023-11-23 RX ORDER — ONDANSETRON 4 MG/1
4 TABLET, ORALLY DISINTEGRATING ORAL ONCE
Status: COMPLETED | OUTPATIENT
Start: 2023-11-23 | End: 2023-11-23

## 2023-11-23 RX ORDER — ACETAMINOPHEN 325 MG/1
975 TABLET ORAL ONCE
Status: COMPLETED | OUTPATIENT
Start: 2023-11-23 | End: 2023-11-23

## 2023-11-23 RX ADMIN — ACETAMINOPHEN 975 MG: 325 TABLET, FILM COATED ORAL at 20:11

## 2023-11-23 RX ADMIN — ONDANSETRON 4 MG: 4 TABLET, ORALLY DISINTEGRATING ORAL at 20:11

## 2023-11-23 ASSESSMENT — FIBROSIS 4 INDEX: FIB4 SCORE: 0.14

## 2023-11-24 NOTE — ED NOTES
Discharge education provided. Discharge paperwork reviewed with pt. Prescription to be picked up by pt. All questions answered. All belongings with pt. Pt ambulated to lobby unassisted with steady gait.

## 2023-11-24 NOTE — ED PROVIDER NOTES
ED Provider Note    CHIEF COMPLAINT  Chief Complaint   Patient presents with    GLF     At around 5:30 pm at her friend's house, she hit her head, but no loss of consciousness    Dizziness     At around 6 pm associated with headache, vision changes (seeing stars) associated with nausea, ringing in right ear  She said she has vertigo    Denied any chest pain, shortness of breath       EXTERNAL RECORDS REVIEWED  Inpatient Notes inpatient psychiatric admission 11/2/2023    HPI/ROS  LIMITATION TO HISTORY   Select: : None  OUTSIDE HISTORIAN(S):      Katie Graham is a 23 y.o. female who presents with headache, lightheadedness after a fall down 4 stairs striking her head on the railing on the way down.  Patient denies loss of consciousness but is amnestic to approximately 20 minutes prior to the event.  Since the event she has had nausea without vomiting and a headache.  She also notes some intermittent blurred vision and ringing in her ears.      PAST MEDICAL HISTORY   has a past medical history of Allergy, Anxiety, Borderline personality disorder (HCC), Depression, Family history of diabetes mellitus (11/04/2020), GERD (gastroesophageal reflux disease), Head ache, Mast cell activation syndrome (HCC), Migraine, POTS (postural orthostatic tachycardia syndrome), Substance abuse ([facility]), and Suicidal ideation (9/24/2021).    SURGICAL HISTORY   has a past surgical history that includes laparoscopic umbilical hernia repair (2002); colonoscopy,diagnostic (N/A, 9/28/2023); colonoscopy,biopsy (N/A, 9/28/2023); upper gi endoscopy,diagnosis (N/A, 10/24/2023); and upper gi endoscopy,biopsy (N/A, 10/24/2023).    FAMILY HISTORY  Family History   Problem Relation Age of Onset    Cancer Mother 54        bone cancer with mets    Diabetes Mother 30        Type 2    Alcohol abuse Mother         sober 18 months    Hypertension Father     Alcohol abuse Father     Kidney Disease Father     Diabetes Sister         type 2    Genetic Disorder  Sister         club foot    No Known Problems Sister     No Known Problems Brother     Lung Disease Maternal Aunt     Alcohol abuse Maternal Aunt     Cancer Maternal Grandmother         Lung    Alcohol abuse Maternal Grandmother     Diabetes Maternal Grandfather         type 2    Heart Disease Maternal Grandfather     Hypertension Maternal Grandfather     Hyperlipidemia Maternal Grandfather     Alcohol abuse Maternal Grandfather     Heart Disease Paternal Grandmother     Hypertension Paternal Grandmother     Hyperlipidemia Paternal Grandmother     Alcohol abuse Paternal Grandfather     Cancer Maternal great-grandmother         breast       SOCIAL HISTORY  Social History     Tobacco Use    Smoking status: Every Day     Current packs/day: 0.50     Average packs/day: 0.2 packs/day for 0.3 years (0.1 ttl pk-yrs)     Types: Cigarettes     Start date: 8/14/2018     Last attempt to quit: 11/4/2018    Smokeless tobacco: Never    Tobacco comments:     Quit after 3 months   Vaping Use    Vaping Use: Former    Substances: Nicotine, Flavoring, 0 anali     Devices: Refillable tank   Substance and Sexual Activity    Alcohol use: Yes     Alcohol/week: 3.0 oz     Types: 5 Shots of liquor per week     Comment: occ.    Drug use: Not Currently     Types: Marijuana, Inhaled     Comment: past hx of using multiple drugs in high school    Sexual activity: Not Currently     Partners: Female, Male     Birth control/protection: Condom Male       CURRENT MEDICATIONS  Home Medications       Reviewed by Prabhu Brian R.N. (Registered Nurse) on 11/23/23 at 1926  Med List Status: Partial     Medication Last Dose Status   doxepin (SINEQUAN) 25 MG Cap  Active   fluvoxamine (LUVOX) 25 MG tablet  Active   omeprazole (PRILOSEC) 20 MG delayed-release capsule  Active   ondansetron (ZOFRAN ODT) 4 MG TABLET DISPERSIBLE  Active   pantoprazole (PROTONIX) 40 MG Tablet Delayed Response  Active   propranolol (INDERAL) 10 MG Tab  Active   ziprasidone (GEODON)  "40 MG Cap  Active                    ALLERGIES  Allergies   Allergen Reactions    Punica Hives and Rash    Coconut (Cocos Nucifera) Allergy Skin Test     Gluten Meal      celiac    Lactose     Latex     Tomato     Sulfa Drugs Hives     Pt states \"I don't know what happens when I have it, my mom just always told me I was allergic to it and I remember being hospitalized for it when I was a kid\"       PHYSICAL EXAM  VITAL SIGNS: /76   Pulse 93   Temp 36.4 °C (97.6 °F) (Temporal)   Resp 16   Ht 1.626 m (5' 4\")   Wt 122 kg (270 lb)   SpO2 95%   BMI 46.35 kg/m²    Physical Exam  Vitals and nursing note reviewed.   Constitutional:       Appearance: She is well-developed.   HENT:      Head: Normocephalic.   Eyes:      Extraocular Movements: Extraocular movements intact.      Pupils: Pupils are equal, round, and reactive to light.   Cardiovascular:      Rate and Rhythm: Normal rate and regular rhythm.   Pulmonary:      Effort: Pulmonary effort is normal.      Breath sounds: Normal breath sounds.   Abdominal:      Palpations: Abdomen is soft.      Tenderness: There is no abdominal tenderness.   Musculoskeletal:      Cervical back: Normal range of motion.   Neurological:      General: No focal deficit present.      Mental Status: She is alert and oriented to person, place, and time.      Cranial Nerves: No cranial nerve deficit.      Sensory: No sensory deficit.      Motor: No weakness.      Coordination: Coordination normal.      Gait: Gait normal.         DIAGNOSTIC STUDIES / PROCEDURES      RADIOLOGY  I have independently interpreted the diagnostic imaging associated with this visit and am waiting the final reading from the radiologist.   My preliminary interpretation is as follows: CT head no acute process  Radiologist interpretation:   CT-HEAD W/O   Final Result      1.  No acute intracranial hemorrhage or displaced calvarial fracture.             COURSE & MEDICAL DECISION MAKING    ED Observation Status? No; " Patient does not meet criteria for ED Observation.     INITIAL ASSESSMENT, COURSE AND PLAN  Care Narrative: 23-year-old female presents with headache and nausea after falling down 4 steps stairs and hitting her head.  She denies LOC, vomiting but is amnestic to the event and in amount of time prior.  On exam she has normal vital signs and is well-appearing her neurologic exam is nonfocal there is no scalp hematoma or other signs of external head trauma there is no midline spinal tenderness.  Will obtain CT scan of the head to rule out intracranial hemorrhage as patient is not able to be ruled out with [place] head CT rule due to amnesia.      CT head within normal limits discussed with patient diagnosis of closed head injury and likely concussion.  Discussed brain rest as appropriate until symptoms resolve.  Discussed return precautions for intractable vomiting or mental status changes.  Advised PCP follow-up to manage this condition as an outpatient      ADDITIONAL PROBLEM LIST  Past Medical History:   Diagnosis Date    Allergy     Apples cause migrains and fainting    Anxiety     Borderline personality disorder (HCC)     Depression     Family history of diabetes mellitus 11/04/2020    GERD (gastroesophageal reflux disease)     Head ache     Mast cell activation syndrome (HCC)     Migraine     POTS (postural orthostatic tachycardia syndrome)     Substance abuse (HCC)     xanax and alcohol in highschool    Suicidal ideation 9/24/2021       DISPOSITION AND DISCUSSIONS  I have discussed management of the patient with the following physicians and JOSEPH's:      Discussion of management with other QHP or appropriate source(s): None     Escalation of care considered, and ultimately not performed:acute inpatient care management, however at this time, the patient is most appropriate for outpatient management    Barriers to care at this time, including but not limited to: Patient lacks financial resources.     Decision tools  and prescription drugs considered including, but not limited to:  Unable to rule out significant intracranial injury via [place] head CT rule as patient is amnestic to the event .    FINAL DIAGNOSIS  1. Closed head injury, initial encounter Acute   2. Concussion with unknown loss of consciousness status, initial encounter Acute   3. Nausea and vomiting, unspecified vomiting type           Electronically signed by: Kevin Cadena M.D., 11/23/2023 7:49 PM

## 2023-11-24 NOTE — ED TRIAGE NOTES
Chief Complaint   Patient presents with    GLF     At around 5:30 pm at her friend's house, she hit her head, but no loss of consciousness    Dizziness     At around 6 pm associated with headache, vision changes (seeing stars) associated with nausea, ringing in right ear  She said she has vertigo    Denied any chest pain, shortness of breath     Pain: 5/10    Pt came in to triage via wheelchair for the above complaints.     Pt is alert and oriented x 4, speaking in full sentences, follows commands and responds appropriately to questions.     Respirations are even and unlabored.    Pt placed in lobby. Pt educated on triage process.     Pt encouraged to inform staff for any changes in condition or if needs help while waiting to be room in.    Vitals:    11/23/23 1914   BP: (!) 144/90   Pulse: 93   Resp: 16   Temp: 36.4 °C (97.6 °F)   SpO2: 94%

## 2023-11-24 NOTE — ED NOTES
"Bedside report received from off going RN/tech: Alondra, assumed care of patient.  POC discussed with patient. Call light within reach, all needs addressed at this time.       Fall risk interventions in place: Patient's personal possessions are with in their safe reach, Place socks on patient, and Keep floor surfaces clean and dry (all applicable per [facility] Fall risk assessment)   Continuous monitoring: Pulse Ox or Blood Pressure  IVF/IV medications: Not Applicable   Oxygen: Room Air  Bedside sitter: Not Applicable   Isolation: Not Applicable     BP (!) 154/67   Pulse 97   Temp 36.4 °C (97.6 °F) (Temporal)   Resp 17   Ht 1.626 m (5' 4\")   Wt 122 kg (270 lb)   SpO2 93%    "

## 2023-12-07 ENCOUNTER — NON-PROVIDER VISIT (OUTPATIENT)
Dept: INTERNAL MEDICINE | Facility: OTHER | Age: 23
End: 2023-12-07
Payer: MEDICAID

## 2023-12-07 DIAGNOSIS — K50.019 CROHN'S DISEASE OF SMALL INTESTINE WITH COMPLICATION (HCC): ICD-10-CM

## 2023-12-07 PROCEDURE — 97802 MEDICAL NUTRITION INDIV IN: CPT | Performed by: DIETITIAN, REGISTERED

## 2023-12-07 NOTE — PATIENT INSTRUCTIONS
Goals:  Stick to a low fiber diet until you start feeling better. No raw veggies -- only cooked and use the low fiber list for ideas  Consider putting 1/2 tsp of salt into your water 2-4 times per day and try to stay away from the salt packets  Do your best to eat small, frequent meals around 4-6 times per day   I emailed you some handouts regarding what we talked about it

## 2023-12-07 NOTE — PROGRESS NOTES
"Katie (Fernando) is a 23 y.o. female for nutrition counseling/dietitian assessment for Crohn's disease     Dx with Crohn's disease earlier this year (August/September)  No family history  Was using a steriod taper to help with the flare up   Waiting to see GI doc in July has an appointment    Threw up blood 2 weeks ago  BM are \"normal\"   Had some stomach cramping yesterday   Dealing with a lot bloat     24 hour recall:  B - toast with organic butter, cup of coffee (black),   L - mashed potatoes with almond milk, butter,   D - roasted asparagus     History of eating disorders    Beverages: water 5 liters ,   Uses salt packets due to her POTS    Lives in a youth shelter - [facility] - she is a  - community living     Got a new therapist and sees them 2 days/week  - [facility] on her home     PES: Altered GI function RT history of Crohn's AEB a current medical history and current flare up it appears    Katie has a lot going on in her life and she is struggling with managing it all ingrid with her diet restrictions. Given that she feels like she is in a flare up, we discussed the importance of a low fiber/low residue diet to prevent her from having more symptoms. Provided a handout on how to incorporate that along with how to slowly add fiber back into her diet to support a balanced diet once she is feeling better. Encouraged her to stay away from carbonated beverages as well and recommend no salt packets - but instead salty foods to see if that help judith gut as well. Set goals; rtc with RD in 2 months    Time spent: 60 minutes    "

## 2023-12-11 VITALS — WEIGHT: 270 LBS | BODY MASS INDEX: 46.35 KG/M2

## 2023-12-11 ASSESSMENT — FIBROSIS 4 INDEX: FIB4 SCORE: 0.14

## 2023-12-14 ENCOUNTER — APPOINTMENT (OUTPATIENT)
Dept: RADIOLOGY | Facility: MEDICAL CENTER | Age: 23
End: 2023-12-14
Attending: EMERGENCY MEDICINE
Payer: MEDICAID

## 2023-12-14 ENCOUNTER — HOSPITAL ENCOUNTER (EMERGENCY)
Facility: MEDICAL CENTER | Age: 23
End: 2023-12-14
Attending: EMERGENCY MEDICINE
Payer: MEDICAID

## 2023-12-14 VITALS
TEMPERATURE: 97.3 F | BODY MASS INDEX: 46.35 KG/M2 | SYSTOLIC BLOOD PRESSURE: 130 MMHG | RESPIRATION RATE: 16 BRPM | OXYGEN SATURATION: 96 % | WEIGHT: 270 LBS | DIASTOLIC BLOOD PRESSURE: 88 MMHG | HEART RATE: 86 BPM

## 2023-12-14 DIAGNOSIS — S93.402A SPRAIN OF LEFT ANKLE, UNSPECIFIED LIGAMENT, INITIAL ENCOUNTER: ICD-10-CM

## 2023-12-14 PROCEDURE — 99284 EMERGENCY DEPT VISIT MOD MDM: CPT

## 2023-12-14 PROCEDURE — 73610 X-RAY EXAM OF ANKLE: CPT | Mod: LT

## 2023-12-14 ASSESSMENT — FIBROSIS 4 INDEX: FIB4 SCORE: 0.14

## 2023-12-14 NOTE — ED TRIAGE NOTES
Pt BIB [facility] with c/c of fall/syncope yesterday. Pt stating hx of POTS and woke up on the floor. Pt now compalining of left ankle pain. Pt stating she was able to walk some yesterday however today can not ambulate

## 2023-12-14 NOTE — ED NOTES
Pt to room via wheel chair, agree with triage notes. No appreciable edema or hematoma noted to left ankle. Pt c/o pain on lateral aspect of foot and ankle.

## 2023-12-14 NOTE — ED PROVIDER NOTES
ER Provider Note    Scribed for Kate Sweet M.D. by Anthony Augustine. 12/14/2023  10:35 AM    Primary Care Provider: Zully Medina M.D.    CHIEF COMPLAINT  Chief Complaint   Patient presents with    Fall    Ankle Pain            LIMITATION TO HISTORY   Select: : None    HPI/ROS  OUTSIDE HISTORIAN(S):  None.    EXTERNAL RECORDS REVIEWED  Inpatient Notes discharge summary from October patient has history of anxiety depression GERD nicotine dependence diabetes Crohn's disease    Katie Graham is a 23 y.o. female who presents to the ED via EMS for evaluation of left ankle pain onset one day ago. The patient states she lost consciousness and sustained a GLF. Patient reports she regained consciousness on the floor, and initially could place weight on the left ankle. The patient reports she got up for work this morning and could no longer place weight on the left ankle. The patient localizes the pain to the outside of her ankle. 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       PAST MEDICAL HISTORY  Past Medical History:   Diagnosis Date    Allergy     Apples cause migrains and fainting    Anxiety     Borderline personality disorder (HCC)     Depression     Family history of diabetes mellitus 11/04/2020    GERD (gastroesophageal reflux disease)     Head ache     Mast cell activation syndrome (HCC)     Migraine     POTS (postural orthostatic tachycardia syndrome)     Substance abuse (HCC)     xanax and alcohol in highschool    Suicidal ideation 9/24/2021       SURGICAL HISTORY  Past Surgical History:   Procedure Laterality Date    MD UPPER GI ENDOSCOPY,DIAGNOSIS N/A 10/24/2023    Procedure: GASTROSCOPY;  Surgeon: Jamison Jones M.D.;  Location: SURGERY SAME DAY [facility];  Service: Gastroenterology    MD UPPER GI ENDOSCOPY,BIOPSY N/A 10/24/2023    Procedure: GASTROSCOPY, WITH BIOPSY;  Surgeon: Jamison Jones M.D.;  Location: SURGERY SAME DAY [facility];  Service: Gastroenterology    MD  COLONOSCOPY,DIAGNOSTIC N/A 9/28/2023    Procedure: COLONOSCOPY;  Surgeon: Jamison Jones M.D.;  Location: SURGERY SAME DAY [facility];  Service: Gastroenterology    OK COLONOSCOPY,BIOPSY N/A 9/28/2023    Procedure: COLONOSCOPY, WITH BIOPSY;  Surgeon: Jamison Jones M.D.;  Location: SURGERY SAME DAY [facility];  Service: Gastroenterology    LAPAROSCOPIC UMBILICAL HERNIA REPAIR  2002       FAMILY HISTORY  Family History   Problem Relation Age of Onset    Cancer Mother 54        bone cancer with mets    Diabetes Mother 30        Type 2    Alcohol abuse Mother         sober 18 months    Hypertension Father     Alcohol abuse Father     Kidney Disease Father     Diabetes Sister         type 2    Genetic Disorder Sister         club foot    No Known Problems Sister     No Known Problems Brother     Lung Disease Maternal Aunt     Alcohol abuse Maternal Aunt     Cancer Maternal Grandmother         Lung    Alcohol abuse Maternal Grandmother     Diabetes Maternal Grandfather         type 2    Heart Disease Maternal Grandfather     Hypertension Maternal Grandfather     Hyperlipidemia Maternal Grandfather     Alcohol abuse Maternal Grandfather     Heart Disease Paternal Grandmother     Hypertension Paternal Grandmother     Hyperlipidemia Paternal Grandmother     Alcohol abuse Paternal Grandfather     Cancer Maternal great-grandmother         breast       SOCIAL HISTORY   reports that she has been smoking cigarettes. She started smoking about 5 years ago. She has a 0.1 pack-year smoking history. She has never used smokeless tobacco. She reports current alcohol use of about 3.0 oz of alcohol per week. She reports that she does not currently use drugs after having used the following drugs: Marijuana and Inhaled.    CURRENT MEDICATIONS  Discharge Medication List as of 12/14/2023 11:13 AM        CONTINUE these medications which have NOT CHANGED    Details   ondansetron (ZOFRAN ODT) 4 MG TABLET DISPERSIBLE Dissolve 1 Tablet by mouth every  6 hours as needed for Nausea/Vomiting., Disp-30 Tablet, R-0, Normal      doxepin (SINEQUAN) 25 MG Cap Take 1 capsule (25 mg total) by mouth nightly for 60 days, Disp-30 Capsule, R-1, Normal      fluvoxamine (LUVOX) 25 MG tablet Take 1 tablet (25 mg total) by mouth nightly for 60 days, Disp-30 Tablet, R-1, Normal      pantoprazole (PROTONIX) 40 MG Tablet Delayed Response Take 1 tablet (40 mg total) by mouth every morning before breakfast for 60 days, Disp-30 Tablet, R-1, Normal      propranolol (INDERAL) 10 MG Tab Take 1 tablet (10 mg total) by mouth 3 (three) times a day as needed (anxiety) for up to 30 days, Disp-90 Tablet, R-0, Normal      ziprasidone (GEODON) 40 MG Cap Take 1 capsule (40 mg total) by mouth daily with dinner for 60 days, Disp-30 Capsule, R-1, Normal      omeprazole (PRILOSEC) 20 MG delayed-release capsule Take 1 Capsule by mouth every day. Take after completing the 40 mg strength, Disp-30 Capsule, R-0, Normal             ALLERGIES  Punica, Coconut (cocos nucifera) allergy skin test, Gluten meal, Lactose, Latex, Tomato, and Sulfa drugs    PHYSICAL EXAM  /88   Pulse 86   Temp 36.3 °C (97.3 °F)   Resp 16   Wt 122 kg (270 lb)   SpO2 96%   BMI 46.35 kg/m²   Constitutional: Alert in no apparent distress. Well apearing  HENT: Normocephalic, Atraumatic, Bilateral external ears normal. Nose normal.   Eyes:  Conjunctiva normal, non-icteric.   Lungs: Non-labored respirations  Skin: Warm, Dry, No erythema, No rash.   Neurologic: Alert, Grossly non-focal.   Psychiatric: Affect normal, Judgment normal, Mood normal, Appears appropriate and not intoxicated.   Extremities: Slight tenderness to the lateral malleolus no obvious deformity.        DIAGNOSTIC STUDIES & PROCEDURES    Radiology:   The attending Emergency Physician has independently interpreted the diagnostic imaging associated with this visit and is awaiting the final reading from the radiologist, which will be displayed below.    Preliminary  interpretation is a follows: Normal-appearing ankle  Radiologist interpretation:   DX-ANKLE 3+ VIEWS LEFT   Final Result      No acute osseous abnormality.              COURSE & MEDICAL DECISION MAKING    ED Observation Status? No; Patient does not meet criteria for ED Observation.     10:39 AM - Patient seen and evaluated at bedside. Ordered DX-Ankle Left to evaluate. She understands and agrees to the plan of care. Differential diagnoses include but are not limited to: Ankle sprain versus fracture    11:17 AM - Patient was reevaluated at bedside. I discussed with the patient the XR was negative. I informed the patient of my plan to discharge, and to follow up with her PCP as needed. Patient was instructed to return to [facility] ED for worsening swelling, pain, or other concerns. I advised the patient to alternate Tylenol and Ibuprofen for pain. Patient was given the opportunity for questions. I addressed all questions or concerns at this time and they verbalize agreement to the plan of care.     INITIAL ASSESSMENT AND PLAN  Care Narrative: This is a 23-year-old that presents for ankle injury.  She has no obvious deformity on exam.  X-rays performed and does not show any evidence of fracture.  She was placed in a walking boot and discharged.  She is agreeable to this plan.                         DISPOSITION AND DISCUSSIONS  I have discussed management of the patient with the following physicians and JOSEPH's: None.    Discussion of management with other QHP or appropriate source(s): None     Escalation of care considered, and ultimately not performed: diagnostic imaging.    Barriers to care at this time, including but not limited to:  None .     Decision tools and prescription drugs considered including, but not limited to:  none .    The patient will return for new or worsening symptoms and is stable at the time of discharge. Patient was given return precautions. Patient and/or family member verbalizes understanding and  will comply.    DISPOSITION:  Patient will be discharged home in stable condition.    FOLLOW UP:  Zully Medina M.D.  [address] [facility] [id number] [phone]      As needed    [facility], Emergency Dept  [address] [id number] [phone]    Return for worsening swelling pain or other concerns.    Alvino Alvarado M.D.  [address] [facility] [id number] [phone]      As needed      FINAL IMPRESSION   1. Sprain of left ankle, unspecified ligament, initial encounter        Anthony GRIMES (Scribe), am scribing for, and in the presence of, Kate Sweet M.D..    Electronically signed by: Anthony Augustine (Scribliz), 12/14/2023    Kate GRIMES M.D. personally performed the services described in this documentation, as scribed by Anthony Augustine in my presence, and it is both accurate and complete.    The note accurately reflects work and decisions made by me.  Kate Sweet M.D.  12/14/2023  3:27 PM

## 2023-12-18 ENCOUNTER — APPOINTMENT (OUTPATIENT)
Dept: INTERNAL MEDICINE | Facility: OTHER | Age: 23
End: 2023-12-18
Payer: MEDICAID

## 2023-12-18 ENCOUNTER — TELEMEDICINE (OUTPATIENT)
Dept: INTERNAL MEDICINE | Facility: OTHER | Age: 23
End: 2023-12-18
Payer: MEDICAID

## 2023-12-18 DIAGNOSIS — Q66.02 CONGENITAL TALIPES EQUINOVARUS DEFORMITY OF LEFT FOOT: ICD-10-CM

## 2023-12-18 DIAGNOSIS — K50.00 CROHN'S DISEASE OF SMALL INTESTINE WITHOUT COMPLICATION (HCC): ICD-10-CM

## 2023-12-18 DIAGNOSIS — S93.402D SPRAIN OF LEFT ANKLE, UNSPECIFIED LIGAMENT, SUBSEQUENT ENCOUNTER: Primary | ICD-10-CM

## 2023-12-18 PROCEDURE — 99213 OFFICE O/P EST LOW 20 MIN: CPT | Mod: GT,GE

## 2023-12-19 ENCOUNTER — APPOINTMENT (OUTPATIENT)
Dept: INTERNAL MEDICINE | Facility: OTHER | Age: 23
End: 2023-12-19
Payer: MEDICAID

## 2023-12-19 NOTE — PROGRESS NOTES
Virtual Visit: Established Patient   This visit was conducted via Zoom using secure and encrypted videoconferencing technology.   The patient was in their home in the [facility].    The patient's identity was confirmed and verbal consent was obtained for this virtual visit.     Subjective:   CC:   Chief Complaint   Patient presents with    Foot Injury     Has some questions regarding foot injury       Katie Graham is a 23 y.o. female presenting for evaluation and management of:    Regarding her Crohn's disease, patient reports that she completed the steroid taper and has made some dietary changes and since doing that he is feeling much improved.  She denies any ongoing abdominal pain or frequent or bloody bowel movements.  She has been attending GI appointment scheduled for in late January.    Recently she sustained a fall and a an ankle sprain of her left foot.  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  She was evaluated in the ED with no evidence of fracture.  Patient states since injuring her foot she has significant difficulty bearing weight on it.  At baseline she ambulates with a cane, though this has become more troublesome particularly when she is at home and trying to move around her house.  She is requesting a wheelchair to be used temporarily while she is at home.  She has a referral to orthopedics pending.    ROS   Gen: no fevers/chills  CV: no chest pain  Pulm: no shortness of breath    Current medicines (including changes today)  No current outpatient medications on file.     No current facility-administered medications for this visit.       Patient Active Problem List    Diagnosis Date Noted    Crohn's disease of small intestine without complication ([facility]) 09/27/2023     Priority: High    Class 3 severe obesity in adult (HCC) 11/04/2020     Priority: High    Family history of breast cancer 10/04/2023     Priority: Medium    Type 2  diabetes, diet controlled ([facility]) 10/04/2023     Priority: Medium    Sleep apnea 12/08/2021     Priority: Medium    Irregular menses 11/05/2021     Priority: Medium    Gastroesophageal reflux disease without esophagitis 11/04/2020     Priority: Medium    Chronic pain of left ankle 11/04/2020     Priority: Medium    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 11/04/2020     Priority: Medium    Healthcare maintenance 11/04/2020     Priority: Medium    History of migraine 10/04/2023     Priority: Low    Borderline personality disorder ([facility]) 09/25/2021     Priority: Low    Suicidal behavior with attempted self-injury ([facility]) 09/24/2021     Priority: Low    Major depressive disorder with psychotic features ([facility]) 11/04/2020     Priority: Low    Tobacco dependence 10/24/2023    UGIB (upper gastrointestinal bleed) 10/23/2023        Objective:   There were no vitals taken for this visit.    Physical Exam:  Constitutional: Alert, no distress, well-groomed.  Skin: No rashes in visible areas.  Eye: Round. Conjunctiva clear, lids normal. No icterus.   ENMT: Lips pink without lesions, good dentition, moist mucous membranes. Phonation normal.  Neck: No masses, no thyromegaly. Moves freely without pain.  Respiratory: Unlabored respiratory effort, no cough or audible wheeze  Psych: Alert and oriented x3, normal affect and mood.     Assessment and Plan:   The following treatment plan was discussed:     1. Sprain of left ankle, unspecified ligament, subsequent encounter  2. 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  Advised that she call them and schedule appointment not only for acute ankle sprain but also for evaluation of her congenital clubfoot to ensure that we are able to provide her with the appropriate support devices.  In the meantime we will order a wheelchair that she can use as needed to assist with getting around her her home.  - DME Wheelchair    3.  Crohn's disease of small intestine without complication (HCC)  Asymptomatic at this time.  Patient will follow-up with GI at the end of January.    Follow-up: Return in about 6 weeks (around 1/29/2024) for Crohn's.

## 2024-02-05 ENCOUNTER — APPOINTMENT (OUTPATIENT)
Dept: INTERNAL MEDICINE | Facility: OTHER | Age: 24
End: 2024-02-05
Payer: MEDICAID

## 2024-02-06 ENCOUNTER — APPOINTMENT (OUTPATIENT)
Dept: INTERNAL MEDICINE | Facility: OTHER | Age: 24
End: 2024-02-06
Payer: MEDICAID

## 2024-03-01 ENCOUNTER — HOSPITAL ENCOUNTER (EMERGENCY)
Facility: MEDICAL CENTER | Age: 24
End: 2024-03-01
Attending: EMERGENCY MEDICINE
Payer: MEDICAID

## 2024-03-01 ENCOUNTER — APPOINTMENT (OUTPATIENT)
Dept: RADIOLOGY | Facility: MEDICAL CENTER | Age: 24
End: 2024-03-01
Attending: EMERGENCY MEDICINE
Payer: MEDICAID

## 2024-03-01 VITALS
WEIGHT: 271.61 LBS | TEMPERATURE: 98.6 F | OXYGEN SATURATION: 97 % | SYSTOLIC BLOOD PRESSURE: 130 MMHG | RESPIRATION RATE: 17 BRPM | HEART RATE: 90 BPM | DIASTOLIC BLOOD PRESSURE: 84 MMHG | BODY MASS INDEX: 46.62 KG/M2

## 2024-03-01 DIAGNOSIS — J10.1 INFLUENZA B: ICD-10-CM

## 2024-03-01 DIAGNOSIS — K50.00 CROHN'S DISEASE OF SMALL INTESTINE WITHOUT COMPLICATION (HCC): ICD-10-CM

## 2024-03-01 LAB
ALBUMIN SERPL BCP-MCNC: 4 G/DL (ref 3.2–4.9)
ALBUMIN/GLOB SERPL: 1.1 G/DL
ALP SERPL-CCNC: 61 U/L (ref 30–99)
ALT SERPL-CCNC: 26 U/L (ref 2–50)
ANION GAP SERPL CALC-SCNC: 11 MMOL/L (ref 7–16)
AST SERPL-CCNC: 17 U/L (ref 12–45)
BASOPHILS # BLD AUTO: 0.5 % (ref 0–1.8)
BASOPHILS # BLD: 0.03 K/UL (ref 0–0.12)
BILIRUB SERPL-MCNC: 0.4 MG/DL (ref 0.1–1.5)
BUN SERPL-MCNC: 11 MG/DL (ref 8–22)
CALCIUM ALBUM COR SERPL-MCNC: 9.1 MG/DL (ref 8.5–10.5)
CALCIUM SERPL-MCNC: 9.1 MG/DL (ref 8.5–10.5)
CHLORIDE SERPL-SCNC: 106 MMOL/L (ref 96–112)
CO2 SERPL-SCNC: 21 MMOL/L (ref 20–33)
CREAT SERPL-MCNC: 0.67 MG/DL (ref 0.5–1.4)
EOSINOPHIL # BLD AUTO: 0.09 K/UL (ref 0–0.51)
EOSINOPHIL NFR BLD: 1.5 % (ref 0–6.9)
ERYTHROCYTE [DISTWIDTH] IN BLOOD BY AUTOMATED COUNT: 40.6 FL (ref 35.9–50)
FLUAV RNA SPEC QL NAA+PROBE: NEGATIVE
FLUBV RNA SPEC QL NAA+PROBE: POSITIVE
GFR SERPLBLD CREATININE-BSD FMLA CKD-EPI: 127 ML/MIN/1.73 M 2
GLOBULIN SER CALC-MCNC: 3.7 G/DL (ref 1.9–3.5)
GLUCOSE SERPL-MCNC: 76 MG/DL (ref 65–99)
HCG SERPL QL: NEGATIVE
HCT VFR BLD AUTO: 37.8 % (ref 37–47)
HGB BLD-MCNC: 12.9 G/DL (ref 12–16)
IMM GRANULOCYTES # BLD AUTO: 0.01 K/UL (ref 0–0.11)
IMM GRANULOCYTES NFR BLD AUTO: 0.2 % (ref 0–0.9)
LIPASE SERPL-CCNC: 46 U/L (ref 11–82)
LYMPHOCYTES # BLD AUTO: 1.81 K/UL (ref 1–4.8)
LYMPHOCYTES NFR BLD: 29.2 % (ref 22–41)
MCH RBC QN AUTO: 27.2 PG (ref 27–33)
MCHC RBC AUTO-ENTMCNC: 34.1 G/DL (ref 32.2–35.5)
MCV RBC AUTO: 79.6 FL (ref 81.4–97.8)
MONOCYTES # BLD AUTO: 0.52 K/UL (ref 0–0.85)
MONOCYTES NFR BLD AUTO: 8.4 % (ref 0–13.4)
NEUTROPHILS # BLD AUTO: 3.73 K/UL (ref 1.82–7.42)
NEUTROPHILS NFR BLD: 60.2 % (ref 44–72)
NRBC # BLD AUTO: 0 K/UL
NRBC BLD-RTO: 0 /100 WBC (ref 0–0.2)
PLATELET # BLD AUTO: 362 K/UL (ref 164–446)
PMV BLD AUTO: 10 FL (ref 9–12.9)
POTASSIUM SERPL-SCNC: 4.5 MMOL/L (ref 3.6–5.5)
PROT SERPL-MCNC: 7.7 G/DL (ref 6–8.2)
RBC # BLD AUTO: 4.75 M/UL (ref 4.2–5.4)
RSV RNA SPEC QL NAA+PROBE: NEGATIVE
S PYO DNA SPEC NAA+PROBE: NOT DETECTED
SARS-COV-2 RNA RESP QL NAA+PROBE: NOTDETECTED
SODIUM SERPL-SCNC: 138 MMOL/L (ref 135–145)
WBC # BLD AUTO: 6.2 K/UL (ref 4.8–10.8)

## 2024-03-01 PROCEDURE — 99284 EMERGENCY DEPT VISIT MOD MDM: CPT | Mod: 25

## 2024-03-01 PROCEDURE — 85025 COMPLETE CBC W/AUTO DIFF WBC: CPT

## 2024-03-01 PROCEDURE — 80053 COMPREHEN METABOLIC PANEL: CPT

## 2024-03-01 PROCEDURE — 36415 COLL VENOUS BLD VENIPUNCTURE: CPT

## 2024-03-01 PROCEDURE — 87651 STREP A DNA AMP PROBE: CPT

## 2024-03-01 PROCEDURE — 84703 CHORIONIC GONADOTROPIN ASSAY: CPT

## 2024-03-01 PROCEDURE — 83690 ASSAY OF LIPASE: CPT

## 2024-03-01 PROCEDURE — 71045 X-RAY EXAM CHEST 1 VIEW: CPT

## 2024-03-01 PROCEDURE — 0241U HCHG SARS-COV-2 COVID-19 NFCT DS RESP RNA 4 TRGT ED POC: CPT

## 2024-03-01 RX ORDER — OSELTAMIVIR PHOSPHATE 75 MG/1
75 CAPSULE ORAL 2 TIMES DAILY
Qty: 10 CAPSULE | Refills: 0 | Status: ACTIVE | OUTPATIENT
Start: 2024-03-01 | End: 2024-03-11

## 2024-03-01 ASSESSMENT — FIBROSIS 4 INDEX: FIB4 SCORE: 0.14

## 2024-03-01 ASSESSMENT — PAIN DESCRIPTION - PAIN TYPE: TYPE: ACUTE PAIN

## 2024-03-01 NOTE — ED PROVIDER NOTES
"  ER Provider Note    Scribed for Kamran Calderon M.D. by Emily Jackson. 3/1/2024   11:05 AM    Primary Care Provider: Zully Medina M.D.    CHIEF COMPLAINT  Chief Complaint   Patient presents with    Flu Like Symptoms     Pt reports flu like symptoms with cough and hemoptysis x 1 week.      Abdominal Pain     LLQ pain that started today, h/o Crohns.     EXTERNAL RECORDS REVIEWED  Outpatient Notes Patient was seen in the ED on 12/14/23 for sprain of left ankle.     HPI/ROS  LIMITATION TO HISTORY   Select: : None  OUTSIDE HISTORIAN(S):  None    Katie Graham is a 23 y.o. female, with a history of Crohn Disease, who presents to the ED for evaluation of flu like symptoms. She is currently complaining of cough, sore throat, and rhinorrhea onset last week. She notes that her symptoms worsened yesterday while she was at work. She reports coughing up blood at that time and states that the blood seems to be coming up from her throat. She additionally reported some \"shocking\" abdominal pain and notes that she vomited this morning and has had diarrhea for the past week. She currently lives in a homeless shelter. No additional pain or symptoms noted at this time. No alleviating or exacerbating factors noted at this time.     PAST MEDICAL HISTORY  Past Medical History:   Diagnosis Date    Allergy     Apples cause migrains and fainting    Anxiety     Borderline personality disorder (HCC)     Depression     Family history of diabetes mellitus 11/04/2020    GERD (gastroesophageal reflux disease)     Head ache     Mast cell activation syndrome (HCC)     Migraine     POTS (postural orthostatic tachycardia syndrome)     Substance abuse (HCC)     xanax and alcohol in highschool    Suicidal ideation 9/24/2021       SURGICAL HISTORY  Past Surgical History:   Procedure Laterality Date    PA UPPER GI ENDOSCOPY,DIAGNOSIS N/A 10/24/2023    Procedure: GASTROSCOPY;  Surgeon: Jamison Jones M.D.;  Location: SURGERY SAME DAY " [facility];  Service: Gastroenterology    TN UPPER GI ENDOSCOPY,BIOPSY N/A 10/24/2023    Procedure: GASTROSCOPY, WITH BIOPSY;  Surgeon: Jamison Jones M.D.;  Location: SURGERY SAME DAY [facility];  Service: Gastroenterology    TN COLONOSCOPY,DIAGNOSTIC N/A 9/28/2023    Procedure: COLONOSCOPY;  Surgeon: Jamison Jones M.D.;  Location: SURGERY SAME DAY [facility];  Service: Gastroenterology    TN COLONOSCOPY,BIOPSY N/A 9/28/2023    Procedure: COLONOSCOPY, WITH BIOPSY;  Surgeon: Jamison Jones M.D.;  Location: SURGERY SAME DAY [facility];  Service: Gastroenterology    LAPAROSCOPIC UMBILICAL HERNIA REPAIR  2002       FAMILY HISTORY  Family History   Problem Relation Age of Onset    Cancer Mother 54        bone cancer with mets    Diabetes Mother 30        Type 2    Alcohol abuse Mother         sober 18 months    Hypertension Father     Alcohol abuse Father     Kidney Disease Father     Diabetes Sister         type 2    Genetic Disorder Sister         club foot    No Known Problems Sister     No Known Problems Brother     Lung Disease Maternal Aunt     Alcohol abuse Maternal Aunt     Cancer Maternal Grandmother         Lung    Alcohol abuse Maternal Grandmother     Diabetes Maternal Grandfather         type 2    Heart Disease Maternal Grandfather     Hypertension Maternal Grandfather     Hyperlipidemia Maternal Grandfather     Alcohol abuse Maternal Grandfather     Heart Disease Paternal Grandmother     Hypertension Paternal Grandmother     Hyperlipidemia Paternal Grandmother     Alcohol abuse Paternal Grandfather     Cancer Maternal great-grandmother         breast       SOCIAL HISTORY   reports that she quit smoking about 5 years ago. Her smoking use included cigarettes. She started smoking about 5 years ago. She has a 0.2 pack-year smoking history. She has never used smokeless tobacco. She reports current alcohol use of about 1.2 oz of alcohol per week. She reports that she does not currently use drugs after having used the  "following drugs: Marijuana and Inhaled.    CURRENT MEDICATIONS  None noted.     ALLERGIES  Allergies   Allergen Reactions    Punica Hives and Rash    Coconut (Cocos Nucifera) Allergy Skin Test     Gluten Meal      celiac    Lactose     Latex     Tomato     Sulfa Drugs Hives     Pt states \"I don't know what happens when I have it, my mom just always told me I was allergic to it and I remember being hospitalized for it when I was a kid\"        PHYSICAL EXAM  BP (!) 147/80   Pulse (!) 105   Temp 36.9 °C (98.4 °F) (Temporal)   Resp 16   Wt 123 kg (271 lb 9.7 oz)   SpO2 96%   BMI 46.62 kg/m²      Nursing note and vitals reviewed.  Constitutional: Well-developed and well-nourished. No distress.   HENT: Head is normocephalic and atraumatic. Rhinorrhea present on exam. Appears congested  Eyes: Pupils are equal, round, and reactive to light. Conjunctiva are normal.   Cardiovascular: Normal rate and regular rhythm. No murmur heard. Normal radial pulses.  Pulmonary/Chest: Breath sounds normal. No wheezes or rales.   Abdominal: Soft. No distention. Unable to elicit any abdominal tenderness.     Musculoskeletal: Extremities exhibit normal range of motion without edema or tenderness.   Neurological: Awake, alert and oriented to person, place, and time. No focal deficits noted.  Skin: Skin is warm and dry. No rash.   Psychiatric: Normal mood and affect. Appropriate for clinical situation    DIAGNOSTIC STUDIES    Labs:   Results for orders placed or performed during the hospital encounter of 03/01/24   CBC WITH DIFFERENTIAL   Result Value Ref Range    WBC 6.2 4.8 - 10.8 K/uL    RBC 4.75 4.20 - 5.40 M/uL    Hemoglobin 12.9 12.0 - 16.0 g/dL    Hematocrit 37.8 37.0 - 47.0 %    MCV 79.6 (L) 81.4 - 97.8 fL    MCH 27.2 27.0 - 33.0 pg    MCHC 34.1 32.2 - 35.5 g/dL    RDW 40.6 35.9 - 50.0 fL    Platelet Count 362 164 - 446 K/uL    MPV 10.0 9.0 - 12.9 fL    Neutrophils-Polys 60.20 44.00 - 72.00 %    Lymphocytes 29.20 22.00 - 41.00 % "    Monocytes 8.40 0.00 - 13.40 %    Eosinophils 1.50 0.00 - 6.90 %    Basophils 0.50 0.00 - 1.80 %    Immature Granulocytes 0.20 0.00 - 0.90 %    Nucleated RBC 0.00 0.00 - 0.20 /100 WBC    Neutrophils (Absolute) 3.73 1.82 - 7.42 K/uL    Lymphs (Absolute) 1.81 1.00 - 4.80 K/uL    Monos (Absolute) 0.52 0.00 - 0.85 K/uL    Eos (Absolute) 0.09 0.00 - 0.51 K/uL    Baso (Absolute) 0.03 0.00 - 0.12 K/uL    Immature Granulocytes (abs) 0.01 0.00 - 0.11 K/uL    NRBC (Absolute) 0.00 K/uL   COMP METABOLIC PANEL   Result Value Ref Range    Sodium 138 135 - 145 mmol/L    Potassium 4.5 3.6 - 5.5 mmol/L    Chloride 106 96 - 112 mmol/L    Co2 21 20 - 33 mmol/L    Anion Gap 11.0 7.0 - 16.0    Glucose 76 65 - 99 mg/dL    Bun 11 8 - 22 mg/dL    Creatinine 0.67 0.50 - 1.40 mg/dL    Calcium 9.1 8.5 - 10.5 mg/dL    Correct Calcium 9.1 8.5 - 10.5 mg/dL    AST(SGOT) 17 12 - 45 U/L    ALT(SGPT) 26 2 - 50 U/L    Alkaline Phosphatase 61 30 - 99 U/L    Total Bilirubin 0.4 0.1 - 1.5 mg/dL    Albumin 4.0 3.2 - 4.9 g/dL    Total Protein 7.7 6.0 - 8.2 g/dL    Globulin 3.7 (H) 1.9 - 3.5 g/dL    A-G Ratio 1.1 g/dL   LIPASE   Result Value Ref Range    Lipase 46 11 - 82 U/L   HCG QUAL SERUM   Result Value Ref Range    Beta-Hcg Qualitative Serum Negative Negative   ESTIMATED GFR   Result Value Ref Range    GFR (CKD-EPI) 127 >60 mL/min/1.73 m 2   Group A Strep by PCR    Specimen: Throat   Result Value Ref Range    Group A Strep by PCR Not Detected Not Detected   POC CoV-2, FLU A/B, RSV by PCR   Result Value Ref Range    POC Influenza A RNA, PCR Negative Negative    POC Influenza B RNA, PCR POSITIVE (A) Negative    POC RSV, by PCR Negative Negative    POC SARS-CoV-2, PCR NotDetected        Radiology:   This attending emergency physician has independently interpreted the diagnostic imaging associated with this visit and is awaiting the final reading from the radiologist.   Preliminary interpretation is a follows: Shows no pneumonia or other cardiopulmonary  abnormality.    Radiologist interpretation:   DX-CHEST-PORTABLE (1 VIEW)   Final Result      No acute cardiac or pulmonary abnormalities are identified.           INITIAL ASSESSMENT AND PLAN    11:05 AM - Patient was evaluated at bedside. Ordered for CXR, Strep PCR, POCT CoV-2, Flu A/B, RSV, estimated GFR, CBC with diff, CMP, lipase, hCG qual, UA to evaluate. Patient verbalizes understanding and support with my plan of care.  Differential diagnoses include but not limited to: URI, strep pharyngitis, pneumonia     COURSE AND MEDICAL DECISION MAKING    12:12 PM - Upon reassessment, patient is resting comfortably with stable vital signs. No new complaints at this time. Discussed results with patient and informed her that she is influenza B positive.      The patient presents today with signs and symptoms consistent with a viral upper respiratory infection. They have a normal pulse oximetry on room air and a normal pulmonary exam. Therefore, I feel that the likelihood of pneumonia is low. This patient does not demonstrate any clinical evidence of pneumonia, meningitis, appendicitis, or other acute medical emergency. Overall, the patient is very well appearing. I do not feel that this patient would benefit from antibiotics at this time. Discussed prescription for Tamiflu including pros and cons to the medication. Additionally referred patient to gastroenterology because she is not currently following with a gastroenterologist for Crohn Disease.       DISPOSITION AND DISCUSSIONS  Discussion of management with other [facility] or appropriate source(s): None     Barriers to care at this time, including but not limited to: Patient is homeless.     Decision tools and prescription drugs considered including, but not limited to: Antibiotics not indicated due to viral illness .    The patient will return for new or worsening symptoms and is stable at the time of discharge.    The patient is referred to a primary physician for blood  pressure management, diabetic screening, and for all other preventative health concerns.    DISPOSITION:  Patient will be discharged home in stable condition.    FOLLOW UP:  Zully Medina M.D.  [address] [facility] [id number] [phone]    Schedule an appointment as soon as possible for a visit       [facility], Emergency Dept  [address] [facility] [id number] [phone]    If symptoms worsen    OUTPATIENT MEDICATIONS:  Discharge Medication List as of 3/1/2024 12:12 PM        START taking these medications    Details   oseltamivir (TAMIFLU) 75 MG Cap Take 1 Capsule by mouth 2 times a day for 5 days., Disp-10 Capsule, R-0, Normal           FINAL DIAGNOSIS  1. Influenza B    2. Crohn's disease of small intestine without complication (HCC)         Emily GRIMES (Benedict), am scribing for, and in the presence of, Kamran Calderon M.D..    Electronically signed by: Emily Jackson (Benedict), 3/1/2024    IKamran M.D. personally performed the services described in this documentation, as scribed by Emily Jackson in my presence, and it is both accurate and complete.      The note accurately reflects work and decisions made by me.  Kamran Calderon M.D.  3/1/2024  1:37 PM

## 2024-03-01 NOTE — ED NOTES
Pt sts she feels like she has strep, d/t sore throat and has not been feeling well. Also c/o some abdominal pain and blood in stool. Per pt she has recently been dx with crohn's and feels as though she is having a flare.

## 2024-03-01 NOTE — ED TRIAGE NOTES
Chief Complaint   Patient presents with    Flu Like Symptoms     Pt reports flu like symptoms with cough and hemoptysis x 1 week.      Abdominal Pain     LLQ pain that started today, h/o Crohns.     Pt reports one bloody stool 2 days ago, and severe diarrhea x 1 week.

## 2024-03-02 ENCOUNTER — APPOINTMENT (OUTPATIENT)
Dept: TELEHEALTH | Facility: TELEMEDICINE | Age: 24
End: 2024-03-02
Payer: MEDICAID

## 2024-03-06 ENCOUNTER — PHARMACY VISIT (OUTPATIENT)
Dept: PHARMACY | Facility: MEDICAL CENTER | Age: 24
End: 2024-03-06
Payer: COMMERCIAL

## 2024-03-06 PROCEDURE — RXOTC WILLOW AMBULATORY OTC CHARGE: Performed by: PHARMACIST

## 2024-03-06 PROCEDURE — RXMED WILLOW AMBULATORY MEDICATION CHARGE: Performed by: EMERGENCY MEDICINE

## 2024-03-12 ENCOUNTER — TELEMEDICINE (OUTPATIENT)
Dept: INTERNAL MEDICINE | Facility: OTHER | Age: 24
End: 2024-03-12
Payer: MEDICAID

## 2024-03-12 VITALS — WEIGHT: 240 LBS | BODY MASS INDEX: 40.97 KG/M2 | HEIGHT: 64 IN

## 2024-03-12 DIAGNOSIS — R11.11 INTRACTABLE VOMITING WITHOUT NAUSEA: ICD-10-CM

## 2024-03-12 DIAGNOSIS — K50.00 CROHN'S DISEASE OF SMALL INTESTINE WITHOUT COMPLICATION (HCC): ICD-10-CM

## 2024-03-12 PROCEDURE — 99213 OFFICE O/P EST LOW 20 MIN: CPT | Mod: GT,GE

## 2024-03-12 ASSESSMENT — FIBROSIS 4 INDEX: FIB4 SCORE: 0.19

## 2024-03-12 ASSESSMENT — PAIN SCALES - GENERAL: PAINLEVEL: 6=MODERATE PAIN

## 2024-04-07 ENCOUNTER — APPOINTMENT (OUTPATIENT)
Dept: RADIOLOGY | Facility: MEDICAL CENTER | Age: 24
End: 2024-04-07
Attending: EMERGENCY MEDICINE
Payer: MEDICAID

## 2024-04-07 ENCOUNTER — HOSPITAL ENCOUNTER (EMERGENCY)
Facility: MEDICAL CENTER | Age: 24
End: 2024-04-07
Attending: EMERGENCY MEDICINE
Payer: MEDICAID

## 2024-04-07 VITALS
RESPIRATION RATE: 20 BRPM | HEART RATE: 82 BPM | HEIGHT: 64 IN | WEIGHT: 268.96 LBS | DIASTOLIC BLOOD PRESSURE: 69 MMHG | BODY MASS INDEX: 45.92 KG/M2 | TEMPERATURE: 97.6 F | OXYGEN SATURATION: 97 % | SYSTOLIC BLOOD PRESSURE: 126 MMHG

## 2024-04-07 DIAGNOSIS — K21.9 GASTROESOPHAGEAL REFLUX DISEASE WITHOUT ESOPHAGITIS: ICD-10-CM

## 2024-04-07 DIAGNOSIS — J18.9 PNEUMONIA OF LEFT LOWER LOBE DUE TO INFECTIOUS ORGANISM: ICD-10-CM

## 2024-04-07 LAB
ALBUMIN SERPL BCP-MCNC: 4.1 G/DL (ref 3.2–4.9)
ALBUMIN/GLOB SERPL: 1 G/DL
ALP SERPL-CCNC: 65 U/L (ref 30–99)
ALT SERPL-CCNC: 18 U/L (ref 2–50)
ANION GAP SERPL CALC-SCNC: 13 MMOL/L (ref 7–16)
AST SERPL-CCNC: 15 U/L (ref 12–45)
BASOPHILS # BLD AUTO: 0.3 % (ref 0–1.8)
BASOPHILS # BLD: 0.02 K/UL (ref 0–0.12)
BILIRUB SERPL-MCNC: 0.2 MG/DL (ref 0.1–1.5)
BUN SERPL-MCNC: 9 MG/DL (ref 8–22)
CALCIUM ALBUM COR SERPL-MCNC: 9.1 MG/DL (ref 8.5–10.5)
CALCIUM SERPL-MCNC: 9.2 MG/DL (ref 8.5–10.5)
CHLORIDE SERPL-SCNC: 108 MMOL/L (ref 96–112)
CO2 SERPL-SCNC: 17 MMOL/L (ref 20–33)
CREAT SERPL-MCNC: 0.76 MG/DL (ref 0.5–1.4)
EOSINOPHIL # BLD AUTO: 0.06 K/UL (ref 0–0.51)
EOSINOPHIL NFR BLD: 0.8 % (ref 0–6.9)
ERYTHROCYTE [DISTWIDTH] IN BLOOD BY AUTOMATED COUNT: 41 FL (ref 35.9–50)
FLUAV RNA SPEC QL NAA+PROBE: NEGATIVE
FLUBV RNA SPEC QL NAA+PROBE: NEGATIVE
GFR SERPLBLD CREATININE-BSD FMLA CKD-EPI: 114 ML/MIN/1.73 M 2
GLOBULIN SER CALC-MCNC: 4 G/DL (ref 1.9–3.5)
GLUCOSE SERPL-MCNC: 96 MG/DL (ref 65–99)
HCT VFR BLD AUTO: 37.6 % (ref 37–47)
HGB BLD-MCNC: 12.9 G/DL (ref 12–16)
IMM GRANULOCYTES # BLD AUTO: 0.02 K/UL (ref 0–0.11)
IMM GRANULOCYTES NFR BLD AUTO: 0.3 % (ref 0–0.9)
LACTATE SERPL-SCNC: 0.7 MMOL/L (ref 0.5–2)
LYMPHOCYTES # BLD AUTO: 1.83 K/UL (ref 1–4.8)
LYMPHOCYTES NFR BLD: 24.3 % (ref 22–41)
MCH RBC QN AUTO: 26.8 PG (ref 27–33)
MCHC RBC AUTO-ENTMCNC: 34.3 G/DL (ref 32.2–35.5)
MCV RBC AUTO: 78.2 FL (ref 81.4–97.8)
MONOCYTES # BLD AUTO: 0.68 K/UL (ref 0–0.85)
MONOCYTES NFR BLD AUTO: 9 % (ref 0–13.4)
NEUTROPHILS # BLD AUTO: 4.93 K/UL (ref 1.82–7.42)
NEUTROPHILS NFR BLD: 65.3 % (ref 44–72)
NRBC # BLD AUTO: 0 K/UL
NRBC BLD-RTO: 0 /100 WBC (ref 0–0.2)
PLATELET # BLD AUTO: 386 K/UL (ref 164–446)
PMV BLD AUTO: 10.3 FL (ref 9–12.9)
POTASSIUM SERPL-SCNC: 4.2 MMOL/L (ref 3.6–5.5)
PROT SERPL-MCNC: 8.1 G/DL (ref 6–8.2)
RBC # BLD AUTO: 4.81 M/UL (ref 4.2–5.4)
RSV RNA SPEC QL NAA+PROBE: NEGATIVE
SARS-COV-2 RNA RESP QL NAA+PROBE: NOTDETECTED
SODIUM SERPL-SCNC: 138 MMOL/L (ref 135–145)
WBC # BLD AUTO: 7.5 K/UL (ref 4.8–10.8)

## 2024-04-07 PROCEDURE — 85025 COMPLETE CBC W/AUTO DIFF WBC: CPT

## 2024-04-07 PROCEDURE — 83605 ASSAY OF LACTIC ACID: CPT

## 2024-04-07 PROCEDURE — 80053 COMPREHEN METABOLIC PANEL: CPT

## 2024-04-07 PROCEDURE — 87040 BLOOD CULTURE FOR BACTERIA: CPT

## 2024-04-07 PROCEDURE — 36415 COLL VENOUS BLD VENIPUNCTURE: CPT

## 2024-04-07 PROCEDURE — 0241U HCHG SARS-COV-2 COVID-19 NFCT DS RESP RNA 4 TRGT ED POC: CPT

## 2024-04-07 PROCEDURE — 99284 EMERGENCY DEPT VISIT MOD MDM: CPT

## 2024-04-07 PROCEDURE — 71045 X-RAY EXAM CHEST 1 VIEW: CPT

## 2024-04-07 RX ORDER — DOXYCYCLINE 100 MG/1
100 CAPSULE ORAL 2 TIMES DAILY
Qty: 14 CAPSULE | Refills: 0 | Status: ACTIVE | OUTPATIENT
Start: 2024-04-07 | End: 2024-04-15

## 2024-04-07 RX ORDER — ONDANSETRON 4 MG/1
4 TABLET, ORALLY DISINTEGRATING ORAL EVERY 6 HOURS PRN
Qty: 10 TABLET | Refills: 0 | Status: SHIPPED | OUTPATIENT
Start: 2024-04-07 | End: 2024-04-24 | Stop reason: SDUPTHER

## 2024-04-07 RX ORDER — ALBUTEROL SULFATE 90 UG/1
2 AEROSOL, METERED RESPIRATORY (INHALATION) EVERY 6 HOURS PRN
Qty: 8.5 G | Refills: 0 | Status: SHIPPED | OUTPATIENT
Start: 2024-04-07

## 2024-04-07 ASSESSMENT — FIBROSIS 4 INDEX: FIB4 SCORE: 0.19

## 2024-04-08 ENCOUNTER — PHARMACY VISIT (OUTPATIENT)
Dept: PHARMACY | Facility: MEDICAL CENTER | Age: 24
End: 2024-04-08
Payer: COMMERCIAL

## 2024-04-08 PROCEDURE — RXMED WILLOW AMBULATORY MEDICATION CHARGE: Performed by: EMERGENCY MEDICINE

## 2024-04-08 NOTE — ED NOTES
Bedside report from Yoli MAYORGA.     Pt resting on gurney connected to continuous monitor with call light and personal belongings within reach. Pt experiencing chest tightness 4/10, pt states related to coughing.

## 2024-04-08 NOTE — ED NOTES
Covid test collected and running. Updated on POC, awaiting remaining results. Pt provided with water, okay per ERP.

## 2024-04-08 NOTE — ED PROVIDER NOTES
CHIEF COMPLAINT  Chief Complaint   Patient presents with    Shortness of Breath     Report that she didn't have nicotine for a day and reports now having runny nose, sneezing, and headache, and temp 105 at home and cough - clear sputum.  Reports pulse has been high.  Pt reports h/o POTS.        LIMITATION TO HISTORY   Select: none    HPI    Katie Graham is a 23 y.o. adult who presents to the Emergency Department for shortness of breath onset last week. The patient reports that she has had a cough, headache, runny nose, and sneezing for the last week. She adds that earlier today she had a fever of 105 °F prompting her to present to the hospital. She reports that before work today she passed out after her heart rate was elevated. Currently in the ED her hear rate is 106 BPM. She states that she is nauseous. She has a history of POTS. She notes that she has not smoked nicotine in a day.     OUTSIDE HISTORIAN(S):  Select: None    EXTERNAL RECORDS REVIEWED  Select: The patient was here 3/1/24 and diagnosed with flu influenza B.     PAST MEDICAL HISTORY  Past Medical History:   Diagnosis Date    Allergy     Apples cause migrains and fainting    Anxiety     Borderline personality disorder (HCC)     Depression     Family history of diabetes mellitus 11/04/2020    GERD (gastroesophageal reflux disease)     Head ache     Mast cell activation syndrome (HCC)     Migraine     POTS (postural orthostatic tachycardia syndrome)     Substance abuse (HCC)     xanax and alcohol in highschool    Suicidal ideation 9/24/2021     .    SURGICAL HISTORY  Past Surgical History:   Procedure Laterality Date    IA UPPER GI ENDOSCOPY,DIAGNOSIS N/A 10/24/2023    Procedure: GASTROSCOPY;  Surgeon: Jamison Jones M.D.;  Location: SURGERY SAME DAY [facility];  Service: Gastroenterology    IA UPPER GI ENDOSCOPY,BIOPSY N/A 10/24/2023    Procedure: GASTROSCOPY, WITH BIOPSY;  Surgeon: Jamison Jones M.D.;  Location: SURGERY SAME DAY [facility];  Service:  Gastroenterology    IL COLONOSCOPY,DIAGNOSTIC N/A 9/28/2023    Procedure: COLONOSCOPY;  Surgeon: Jamison Jones M.D.;  Location: SURGERY SAME DAY [facility];  Service: Gastroenterology    IL COLONOSCOPY,BIOPSY N/A 9/28/2023    Procedure: COLONOSCOPY, WITH BIOPSY;  Surgeon: Jamison Jones M.D.;  Location: SURGERY SAME DAY [facility];  Service: Gastroenterology    LAPAROSCOPIC UMBILICAL HERNIA REPAIR  2002         FAMILY HISTORY  Family History   Problem Relation Age of Onset    Cancer Mother 54        bone cancer with mets    Diabetes Mother 30        Type 2    Alcohol abuse Mother         sober 18 months    Hypertension Father     Alcohol abuse Father     Kidney Disease Father     Diabetes Sister         type 2    Genetic Disorder Sister         club foot    No Known Problems Sister     No Known Problems Brother     Lung Disease Maternal Aunt     Alcohol abuse Maternal Aunt     Cancer Maternal Grandmother         Lung    Alcohol abuse Maternal Grandmother     Diabetes Maternal Grandfather         type 2    Heart Disease Maternal Grandfather     Hypertension Maternal Grandfather     Hyperlipidemia Maternal Grandfather     Alcohol abuse Maternal Grandfather     Heart Disease Paternal Grandmother     Hypertension Paternal Grandmother     Hyperlipidemia Paternal Grandmother     Alcohol abuse Paternal Grandfather     Cancer Maternal great-grandmother         breast          SOCIAL HISTORY  Social History     Tobacco Use    Smoking status: Former     Current packs/day: 0.50     Average packs/day: 0.4 packs/day for 0.7 years (0.3 ttl pk-yrs)     Types: Cigarettes     Start date: 8/14/2018     Quit date: 11/4/2018    Smokeless tobacco: Never    Tobacco comments:     Quit after 3 months   Vaping Use    Vaping Use: Former    Substances: Nicotine, Flavoring, 0 anali     Devices: Refillable tank   Substance Use Topics    Alcohol use: Yes     Alcohol/week: 1.2 oz     Types: 2 Shots of liquor per week     Comment: occ.    Drug use: Not  "Currently     Types: Marijuana, Inhaled     Comment: past hx of using multiple drugs in high school         CURRENT MEDICATIONS  No current facility-administered medications on file prior to encounter.     No current outpatient medications on file prior to encounter.         ALLERGIES  Allergies   Allergen Reactions    Punica Hives and Rash    Coconut (Cocos Nucifera) Allergy Skin Test     Gluten Meal      celiac    Lactose     Latex     Tomato     Sulfa Drugs Hives     Pt states \"I don't know what happens when I have it, my mom just always told me I was allergic to it and I remember being hospitalized for it when I was a kid\"       PHYSICAL EXAM  VITAL SIGNS:/86   Pulse (!) 106   Temp 36.6 °C (97.8 °F) (Oral)   Resp 16   Ht 1.626 m (5' 4\")   Wt 122 kg (268 lb 15.4 oz)   LMP 04/01/2024   SpO2 96%   BMI 46.17 kg/m²       Constitutional: Well-developed no acute distress   HENT: Normocephalic, Atraumatic, Bilateral external ears normal.  Eyes:  conjunctiva are normal.   Neck: Supple.  Nontender midline  Cardiovascular: Mildly tachycardic rate and rhythm without murmurs gallops or rubs.   Thorax & Lungs: No respiratory distress. Breathing comfortably. Lungs are clear to auscultation bilaterally, there are no wheezes no rales. Chest wall is nontender.  Abdomen: Soft, non distended, non tender   Skin: Warm, Dry, No erythema,   Back: No tenderness, No CVA tenderness.  Musculoskeletal: No clubbing cyanosis or edema good range of motion   Neurologic: Alert & oriented x 3, normal sensation moving all extremities appears normal   Psychiatric: Affect normal, Judgment normal, Mood normal.     DIAGNOSTIC STUDIES / PROCEDURES    LABS  Results for orders placed or performed during the hospital encounter of 04/07/24   Lactic Acid   Result Value Ref Range    Lactic Acid 0.7 0.5 - 2.0 mmol/L   CBC with Differential   Result Value Ref Range    WBC 7.5 4.8 - 10.8 K/uL    RBC 4.81 4.20 - 5.40 M/uL    Hemoglobin 12.9 12.0 - " 16.0 g/dL    Hematocrit 37.6 37.0 - 47.0 %    MCV 78.2 (L) 81.4 - 97.8 fL    MCH 26.8 (L) 27.0 - 33.0 pg    MCHC 34.3 32.2 - 35.5 g/dL    RDW 41.0 35.9 - 50.0 fL    Platelet Count 386 164 - 446 K/uL    MPV 10.3 9.0 - 12.9 fL    Neutrophils-Polys 65.30 44.00 - 72.00 %    Lymphocytes 24.30 22.00 - 41.00 %    Monocytes 9.00 0.00 - 13.40 %    Eosinophils 0.80 0.00 - 6.90 %    Basophils 0.30 0.00 - 1.80 %    Immature Granulocytes 0.30 0.00 - 0.90 %    Nucleated RBC 0.00 0.00 - 0.20 /100 WBC    Neutrophils (Absolute) 4.93 1.82 - 7.42 K/uL    Lymphs (Absolute) 1.83 1.00 - 4.80 K/uL    Monos (Absolute) 0.68 0.00 - 0.85 K/uL    Eos (Absolute) 0.06 0.00 - 0.51 K/uL    Baso (Absolute) 0.02 0.00 - 0.12 K/uL    Immature Granulocytes (abs) 0.02 0.00 - 0.11 K/uL    NRBC (Absolute) 0.00 K/uL   Complete Metabolic Panel   Result Value Ref Range    Sodium 138 135 - 145 mmol/L    Potassium 4.2 3.6 - 5.5 mmol/L    Chloride 108 96 - 112 mmol/L    Co2 17 (L) 20 - 33 mmol/L    Anion Gap 13.0 7.0 - 16.0    Glucose 96 65 - 99 mg/dL    Bun 9 8 - 22 mg/dL    Creatinine 0.76 0.50 - 1.40 mg/dL    Calcium 9.2 8.5 - 10.5 mg/dL    Correct Calcium 9.1 8.5 - 10.5 mg/dL    AST(SGOT) 15 12 - 45 U/L    ALT(SGPT) 18 2 - 50 U/L    Alkaline Phosphatase 65 30 - 99 U/L    Total Bilirubin 0.2 0.1 - 1.5 mg/dL    Albumin 4.1 3.2 - 4.9 g/dL    Total Protein 8.1 6.0 - 8.2 g/dL    Globulin 4.0 (H) 1.9 - 3.5 g/dL    A-G Ratio 1.0 g/dL   ESTIMATED GFR   Result Value Ref Range    GFR (CKD-EPI) 114 >60 mL/min/1.73 m 2   POC CoV-2, FLU A/B, RSV by PCR   Result Value Ref Range    POC Influenza A RNA, PCR Negative Negative    POC Influenza B RNA, PCR Negative Negative    POC RSV, by PCR Negative Negative    POC SARS-CoV-2, PCR NotDetected          EKG:   I have independently interpreted this EKG as detailed above.      RADIOLOGY  I have independently interpreted the diagnostic imaging associated with this visit and am waiting the final reading from the radiologist.   My  preliminary interpretation is as follows: X-ray does show slight infiltrate in the left lower lobe      Radiologist interpretation:   DX-CHEST-PORTABLE (1 VIEW)   Final Result      Slight infiltrate opacity in the left lung base.           COURSE & MEDICAL DECISION MAKING    ED COURSE:    ED Observation Status? No, The patient does not qualify for observation status    INTERVENTIONS BY ME:  Medications - No data to display    5:58 PM - Patient seen and examined at bedside. Discussed plan of care, including labs and imaging. Patient agrees to the plan of care. The patient will be medicated with 4 mg Zofran . Ordered for DX-Chest, CBC with diff, CMP, UA, Urine culture, blood culture, POCT CoV-2, Flu A/B, RSV by PCR, and lactic acid to evaluate Sherry Graham's symptoms.     INITIAL ASSESSMENT, COURSE AND PLAN  Care Narrative: Patient presents emerged part for evaluation.  Clinically patient is not hypoxemic breath sounds were clear however chest x-ray does show a slight infiltrate in with her discussion of a cough as well as her fever I will treat empirically with doxycycline.  Viral studies are negative.  At this point I do feel the patient is stable for discharge recommend for her to follow the primary care physician for recheck in 1 week return as needed.     ADDITIONAL PROBLEM LIST  POTS    DISPOSITION AND DISCUSSIONS  I have discussed management of the patient with the following physicians/ JOSEPH's/ ancillary staff:  None    Escalation of care considered, and ultimately not performed: None    Barriers to care at this time, including but not limited to: Patient is homeless.     Decision tools and prescription drugs considered including, but not limited to: None.    The patient will return for new or worsening symptoms and is stable at the time of discharge.    The patient is referred to a primary physician for blood pressure management, diabetic screening, and for all other preventative health  concerns.    DISPOSITION:  Patient will be discharged home in stable condition.    FOLLOW UP:  Zully Medina M.D.  [address] [facility] [id number] [phone]    Schedule an appointment as soon as possible for a visit in 1 week  For re-check      OUTPATIENT MEDICATIONS:  Discharge Medication List as of 4/7/2024  7:50 PM        START taking these medications    Details   doxycycline (MONODOX) 100 MG capsule Take 1 Capsule by mouth 2 times a day for 7 days., Disp-14 Capsule, R-0, Normal      ondansetron (ZOFRAN ODT) 4 MG TABLET DISPERSIBLE Take 1 Tablet by mouth every 6 hours as needed for Nausea/Vomiting., Disp-10 Tablet, R-0, Normal      albuterol 108 (90 Base) MCG/ACT Aero Soln inhalation aerosol Inhale 2 Puffs every 6 hours as needed for Shortness of Breath., Disp-8.5 g, R-0, Normal              FINAL DIAGNOSIS  1. Pneumonia of left lower lobe due to infectious organism    2. Gastroesophageal reflux disease without esophagitis         ITiesha (Benedict), am scribing for, and in the presence of, Sukhi Coello M.D..    Electronically signed by: Tiesha Goodrich (Benedict), 4/7/2024    ISukhi M.D. personally performed the services described in this documentation, as scribed by Tiesha Goodrich in my presence, and it is both accurate and complete.     Electronically signed by: Sukhi Coello M.D.,9:31 PM 04/07/24

## 2024-04-08 NOTE — ED NOTES
Discharge instructions given and discussed, signed copy in chart. Pt verbalized understanding and all questions answered. Copy of prescriptions given to pt. Pt discharged  in stable condition on room air. Personal belongings given to patient.      Yes

## 2024-04-08 NOTE — DISCHARGE PLANNING
This nurse received a call from patient asking me to confirm with the provider that she is not contagious due to her DX of PNA.  CM placed call on hold. ROSA spoke with Dr. Hand who confirmed she is not contagious. When I attempted to pick the call back up to let her know she is not contagious, (as she is staying in a shelter and they need this information), the call was disconnected.

## 2024-04-12 LAB
BACTERIA BLD CULT: NORMAL
SIGNIFICANT IND 70042: NORMAL
SITE SITE: NORMAL
SOURCE SOURCE: NORMAL

## 2024-04-24 ENCOUNTER — OFFICE VISIT (OUTPATIENT)
Dept: INTERNAL MEDICINE | Facility: OTHER | Age: 24
End: 2024-04-24
Payer: MEDICAID

## 2024-04-24 VITALS
SYSTOLIC BLOOD PRESSURE: 124 MMHG | HEIGHT: 64 IN | HEART RATE: 102 BPM | TEMPERATURE: 97.6 F | BODY MASS INDEX: 46.26 KG/M2 | WEIGHT: 271 LBS | OXYGEN SATURATION: 95 % | DIASTOLIC BLOOD PRESSURE: 73 MMHG

## 2024-04-24 DIAGNOSIS — Q66.02 CONGENITAL TALIPES EQUINOVARUS DEFORMITY OF LEFT FOOT: ICD-10-CM

## 2024-04-24 DIAGNOSIS — F50.82 AVOIDANT-RESTRICTIVE FOOD INTAKE DISORDER (ARFID): ICD-10-CM

## 2024-04-24 DIAGNOSIS — K50.80 CROHN'S DISEASE OF BOTH SMALL AND LARGE INTESTINE WITHOUT COMPLICATION (HCC): Primary | ICD-10-CM

## 2024-04-24 DIAGNOSIS — M25.572 CHRONIC PAIN OF LEFT ANKLE: ICD-10-CM

## 2024-04-24 DIAGNOSIS — E87.20 METABOLIC ACIDOSIS: ICD-10-CM

## 2024-04-24 DIAGNOSIS — R11.0 CHRONIC NAUSEA: ICD-10-CM

## 2024-04-24 DIAGNOSIS — K21.9 GASTROESOPHAGEAL REFLUX DISEASE WITHOUT ESOPHAGITIS: ICD-10-CM

## 2024-04-24 DIAGNOSIS — Z59.819 HOUSING SITUATION UNSTABLE: ICD-10-CM

## 2024-04-24 DIAGNOSIS — H83.3X2 NOISE-INDUCED HEARING LOSS OF LEFT EAR, UNSPECIFIED HEARING STATUS ON CONTRALATERAL SIDE: ICD-10-CM

## 2024-04-24 DIAGNOSIS — G89.29 CHRONIC PAIN OF LEFT ANKLE: ICD-10-CM

## 2024-04-24 PROBLEM — E11.9 TYPE 2 DIABETES, DIET CONTROLLED (HCC): Status: RESOLVED | Noted: 2023-10-04 | Resolved: 2024-04-24

## 2024-04-24 PROBLEM — K92.2 UGIB (UPPER GASTROINTESTINAL BLEED): Status: RESOLVED | Noted: 2023-10-23 | Resolved: 2024-04-24

## 2024-04-24 RX ORDER — ONDANSETRON 4 MG/1
4 TABLET, ORALLY DISINTEGRATING ORAL EVERY 8 HOURS PRN
Qty: 30 TABLET | Refills: 0 | Status: SHIPPED | OUTPATIENT
Start: 2024-04-24

## 2024-04-24 RX ORDER — OMEPRAZOLE 20 MG/1
20 CAPSULE, DELAYED RELEASE ORAL DAILY
Qty: 30 CAPSULE | Refills: 2 | Status: SHIPPED | OUTPATIENT
Start: 2024-04-24

## 2024-04-24 SDOH — ECONOMIC STABILITY - HOUSING INSECURITY: HOUSING INSTABILITY UNSPECIFIED: Z59.819

## 2024-04-24 ASSESSMENT — FIBROSIS 4 INDEX: FIB4 SCORE: 0.19
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
## 2024-04-24 NOTE — PATIENT INSTRUCTIONS
Good to see you today [place]!    Glad you are starting to feel better!     Please call GI consultants to schedule follow up with them.     Please get labs done in the next 1-2 weeks, but at minimum before your next appointment.    I have sent refills for the omeprazole (antacid) to take for your GERD and nausea.     I also sent refill for the zofran, though encourage you to limit this use due to it's side effect of prolonged Qtc.     Continue to follow up with your therapist to address your food restriction and encourage you to follow up with nutrition.     Encourage you to follow up with orthopedics per their prior recommendations.     I have sent a referral to audiology to assess your hearing.     Let's plan to have you follow up with one of my colleagues Dr. Cooney to specifically address concern for other autoimmune diseases +/- Ehler's Danlos Syndrome.    Otherwise I will see you the first week of June, at which time we will introduce you to one of the other residents.
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
## 2024-05-20 ENCOUNTER — APPOINTMENT (OUTPATIENT)
Dept: INTERNAL MEDICINE | Facility: OTHER | Age: 24
End: 2024-05-20
Payer: MEDICAID

## 2024-05-22 ENCOUNTER — HOSPITAL ENCOUNTER (OUTPATIENT)
Dept: LAB | Facility: MEDICAL CENTER | Age: 24
End: 2024-05-22
Payer: MEDICAID

## 2024-05-22 DIAGNOSIS — E11.9 TYPE 2 DIABETES, DIET CONTROLLED (HCC): ICD-10-CM

## 2024-05-22 DIAGNOSIS — K50.00 CROHN'S DISEASE OF SMALL INTESTINE WITHOUT COMPLICATION (HCC): ICD-10-CM

## 2024-05-22 DIAGNOSIS — R11.11 INTRACTABLE VOMITING WITHOUT NAUSEA: ICD-10-CM

## 2024-05-22 LAB
ALBUMIN SERPL BCP-MCNC: 4.1 G/DL (ref 3.2–4.9)
ALBUMIN/GLOB SERPL: 1.1 G/DL
ALP SERPL-CCNC: 69 U/L (ref 30–99)
ALT SERPL-CCNC: 18 U/L (ref 2–50)
ANION GAP SERPL CALC-SCNC: 12 MMOL/L (ref 7–16)
AST SERPL-CCNC: 17 U/L (ref 12–45)
BASOPHILS # BLD AUTO: 0.8 % (ref 0–1.8)
BASOPHILS # BLD: 0.07 K/UL (ref 0–0.12)
BILIRUB SERPL-MCNC: <0.2 MG/DL (ref 0.1–1.5)
BUN SERPL-MCNC: 14 MG/DL (ref 8–22)
CALCIUM ALBUM COR SERPL-MCNC: 8.9 MG/DL (ref 8.5–10.5)
CALCIUM SERPL-MCNC: 9 MG/DL (ref 8.5–10.5)
CHLORIDE SERPL-SCNC: 108 MMOL/L (ref 96–112)
CO2 SERPL-SCNC: 20 MMOL/L (ref 20–33)
CREAT SERPL-MCNC: 0.8 MG/DL (ref 0.5–1.4)
CRP SERPL HS-MCNC: 1.99 MG/DL (ref 0–0.75)
EOSINOPHIL # BLD AUTO: 0.15 K/UL (ref 0–0.51)
EOSINOPHIL NFR BLD: 1.7 % (ref 0–6.9)
ERYTHROCYTE [DISTWIDTH] IN BLOOD BY AUTOMATED COUNT: 45.4 FL (ref 35.9–50)
ERYTHROCYTE [SEDIMENTATION RATE] IN BLOOD BY WESTERGREN METHOD: 123 MM/HOUR (ref 0–25)
GFR SERPLBLD CREATININE-BSD FMLA CKD-EPI: 107 ML/MIN/1.73 M 2
GLOBULIN SER CALC-MCNC: 3.6 G/DL (ref 1.9–3.5)
GLUCOSE SERPL-MCNC: 119 MG/DL (ref 65–99)
HCT VFR BLD AUTO: 39.3 % (ref 37–47)
HGB BLD-MCNC: 12.8 G/DL (ref 12–16)
IMM GRANULOCYTES # BLD AUTO: 0.02 K/UL (ref 0–0.11)
IMM GRANULOCYTES NFR BLD AUTO: 0.2 % (ref 0–0.9)
IRON SATN MFR SERPL: 10 % (ref 15–55)
IRON SERPL-MCNC: 31 UG/DL (ref 40–170)
LYMPHOCYTES # BLD AUTO: 2.47 K/UL (ref 1–4.8)
LYMPHOCYTES NFR BLD: 27.4 % (ref 22–41)
MCH RBC QN AUTO: 26.5 PG (ref 27–33)
MCHC RBC AUTO-ENTMCNC: 32.6 G/DL (ref 32.2–35.5)
MCV RBC AUTO: 81.4 FL (ref 81.4–97.8)
MONOCYTES # BLD AUTO: 0.65 K/UL (ref 0–0.85)
MONOCYTES NFR BLD AUTO: 7.2 % (ref 0–13.4)
NEUTROPHILS # BLD AUTO: 5.66 K/UL (ref 1.82–7.42)
NEUTROPHILS NFR BLD: 62.7 % (ref 44–72)
NRBC # BLD AUTO: 0 K/UL
NRBC BLD-RTO: 0 /100 WBC (ref 0–0.2)
PLATELET # BLD AUTO: 413 K/UL (ref 164–446)
PMV BLD AUTO: 10.7 FL (ref 9–12.9)
POTASSIUM SERPL-SCNC: 4.5 MMOL/L (ref 3.6–5.5)
PROT SERPL-MCNC: 7.7 G/DL (ref 6–8.2)
RBC # BLD AUTO: 4.83 M/UL (ref 4.2–5.4)
SODIUM SERPL-SCNC: 140 MMOL/L (ref 135–145)
TIBC SERPL-MCNC: 324 UG/DL (ref 250–450)
UIBC SERPL-MCNC: 293 UG/DL (ref 110–370)
WBC # BLD AUTO: 9 K/UL (ref 4.8–10.8)

## 2024-05-23 ENCOUNTER — TELEMEDICINE (OUTPATIENT)
Dept: INTERNAL MEDICINE | Facility: OTHER | Age: 24
End: 2024-05-23
Payer: MEDICAID

## 2024-05-23 VITALS — HEIGHT: 64 IN | WEIGHT: 253 LBS | BODY MASS INDEX: 43.19 KG/M2

## 2024-05-23 DIAGNOSIS — E61.1 DIETARY IRON DEFICIENCY WITHOUT ANEMIA: ICD-10-CM

## 2024-05-23 DIAGNOSIS — M24.80 GENERALIZED HYPERMOBILITY OF JOINTS: ICD-10-CM

## 2024-05-23 LAB — VIT B12 SERPL-MCNC: 684 PG/ML (ref 211–911)

## 2024-05-23 PROCEDURE — 99214 OFFICE O/P EST MOD 30 MIN: CPT | Mod: GT,GC | Performed by: STUDENT IN AN ORGANIZED HEALTH CARE EDUCATION/TRAINING PROGRAM

## 2024-05-23 RX ORDER — FERROUS GLUCONATE 324(38)MG
324 TABLET ORAL
Qty: 36 TABLET | Refills: 1 | Status: SHIPPED | OUTPATIENT
Start: 2024-05-24 | End: 2024-11-08

## 2024-05-23 RX ORDER — FERROUS GLUCONATE 324(38)MG
324 TABLET ORAL
Qty: 90 TABLET | Refills: 0 | Status: SHIPPED | OUTPATIENT
Start: 2024-05-23 | End: 2024-05-23

## 2024-05-23 ASSESSMENT — ENCOUNTER SYMPTOMS
FEVER: 0
COUGH: 0
SHORTNESS OF BREATH: 0
NAUSEA: 0
PALPITATIONS: 0
CHILLS: 1
DIARRHEA: 0
MYALGIAS: 0
DEPRESSION: 0
CONSTIPATION: 0
DIZZINESS: 0
VOMITING: 0
WEIGHT LOSS: 0
WEAKNESS: 0

## 2024-05-23 ASSESSMENT — FIBROSIS 4 INDEX: FIB4 SCORE: 0.2

## 2024-05-23 NOTE — PROGRESS NOTES
"    This evaluation was conducted via Zoom using secure and encrypted videoconferencing technology. The patient was in their home in the [facility].    The patient's identity was confirmed and verbal consent was obtained for this virtual visit.     Chief Complaint:  Follow up- concern for autoimmune disease +/- Ehler Danlos Syndrome    Last Seen:   4/2024    PCP: Zully Medina M.D.    History of Present Illness:   Patient is a 23 y.o. with pertinent PMHx of avoidant/restrictive food intake disorder, borderline personality disorder, chronic nausea, obesity class III, Crohn's disease of both small and large intestine, left club foot (followed by [facility]), GERD, unstable housing, tobacco use disorder presenting today to discuss concern for autoimmune disease +/- Ehler Danlos Syndrome.     States she feels \"pretty good\"- last Crohn's flare was 1/2024.    Concern for autoimmune disease  She states one of her sisters has polyps and her mom had Ehler Danlos and she feels her dad may have as well. Both her sister's kids are diagnosed with Ehler Danlos and POTS. She states she has always felt hypermobile. This is notable at her thumb joint and hands in general. Also notable at knees. Also states her hips and shoulders dislocate a lot. Also feels she has stretchy skin. Notes joint pain at shoulder (R>L) and mid back. Mid back pain/ shoulder pain is present all day-- gets worse with moving/ exercise. Notes significant morning stiffness for about 2 hours after waking up. Denies oral ulcers. Does note intermittent rashes on her chest with stress - not currently present. States she takes headache medication helps her symptoms (acetaminophen, caffeine, aspirin combination pill). If she sits down for a while she flares up she feels like. Notes constant dry mouth without eye dryness.     Pertinent family history  Grandmother (maternal) - lupus  Aunt (paternal) - MS    Pertinent treatment: on and off courses of oral steroids " (last done Nov 2023, effective for GI sx but not for other sx). Left ankle joint injection (last 12/2023, ineffective),     Labs  Iron 31 with %sat 10%, total iron binding 324 and unsat iron binding 293 (no ferritin ordered). C-RP 2.88 > 1.99. CBC/ CMP largely unremarkable. Sed rate 29 > 123.     Imaging  Xray ankle (1/2024) We did weightbearing ankle x-rays today including AP, oblique, and lateral, reviewed by myself demonstrating left ankle deformity consistent with clubfoot.  On lateral, irregularity of the APC questionable chronic appearing fracture.  No new fracture or dislocation.     Has appointment set up with GI in August ([facility]).     ----    Healthcare maintenance: PCP to address  Mood screening:   Drug screening:   Sexual activity screening:   Infectious disease screening:  Colon cancer screening:   Vaccines:   Osteoporosis screening:   Breast cancer screening:   Cervical cancer screening:   Metabolic screening:     Review of Systems   Review of Systems   Constitutional:  Positive for chills (chronic). Negative for fever and weight loss.   Respiratory:  Negative for cough and shortness of breath.    Cardiovascular:  Negative for chest pain, palpitations and leg swelling.   Gastrointestinal:  Negative for constipation, diarrhea, nausea and vomiting.   Genitourinary:  Negative for dysuria.   Musculoskeletal:  Negative for myalgias.   Neurological:  Negative for dizziness and weakness.   Psychiatric/Behavioral:  Negative for depression.       Past Medical History:   Past Medical History:   Diagnosis Date    Allergy     Apples cause migrains and fainting    Anxiety     Borderline personality disorder (HCC)     Depression     Family history of diabetes mellitus 11/04/2020    GERD (gastroesophageal reflux disease)     Head ache     Mast cell activation syndrome (HCC)     Migraine     POTS (postural orthostatic tachycardia syndrome)     Substance abuse (HCC)     xanax and alcohol in highschool    Suicidal ideation  09/24/2021    Type 2 diabetes, diet controlled ([facility]) 10/04/2023    History of A1c's up to 8.3  Patient checks home BG twice daily  Controls with diet  Has not had A1c in over a year       UGIB (upper gastrointestinal bleed) 10/23/2023       Patient Active Problem List    Diagnosis Date Noted    Housing situation unstable 04/24/2024    Chronic nausea 04/24/2024    Avoidant-restrictive food intake disorder (ARFID) 04/24/2024    Metabolic acidosis 04/24/2024    Noise-induced hearing loss of left ear 04/24/2024    Tobacco dependence 10/24/2023    History of migraine 10/04/2023    Family history of breast cancer 10/04/2023    Crohn's disease of both small and large intestine without complication ([facility]) 09/27/2023    Sleep apnea 12/08/2021    Irregular menses 11/05/2021    Borderline personality disorder ([facility]) 09/25/2021    Suicidal behavior with attempted self-injury ([facility]) 09/24/2021    Gastroesophageal reflux disease without esophagitis 11/04/2020    Chronic pain of left ankle 11/04/2020    Major depressive disorder with psychotic features ([facility]) 11/04/2020    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 11/04/2020    Class 3 severe obesity in adult ([facility]) 11/04/2020    Healthcare maintenance 11/04/2020       Past Surgical History:   Past Surgical History:   Procedure Laterality Date    AR UPPER GI ENDOSCOPY,DIAGNOSIS N/A 10/24/2023    Procedure: GASTROSCOPY;  Surgeon: Jamison Jones M.D.;  Location: SURGERY SAME DAY [facility];  Service: Gastroenterology    AR UPPER GI ENDOSCOPY,BIOPSY N/A 10/24/2023    Procedure: GASTROSCOPY, WITH BIOPSY;  Surgeon: Jamison Jones M.D.;  Location: SURGERY SAME DAY [facility];  Service: Gastroenterology    AR COLONOSCOPY,DIAGNOSTIC N/A 9/28/2023    Procedure: COLONOSCOPY;  Surgeon: Jamison Jones M.D.;  Location: SURGERY SAME DAY [facility];  Service: Gastroenterology    AR COLONOSCOPY,BIOPSY N/A 9/28/2023    Procedure: COLONOSCOPY, WITH BIOPSY;  Surgeon: Jamison Jones M.D.;  Location:  SURGERY SAME DAY [facility];  Service: Gastroenterology    LAPAROSCOPIC UMBILICAL HERNIA REPAIR  2002        Allergies:  Punica, Coconut (cocos nucifera) allergy skin test, Gluten meal, Lactose, Latex, Tomato, and Sulfa drugs    Medications:     Current Outpatient Medications:     [START ON 5/24/2024] ferrous gluconate, 324 mg, Oral, MO, WE + FR    omeprazole, 20 mg, Oral, DAILY, Taking    ondansetron, 4 mg, Oral, Q8HRS PRN, PRN    albuterol, 2 Puff, Inhalation, Q6HRS PRN, PRN     Social History:   Social History     Tobacco Use    Smoking status: Former     Current packs/day: 0.50     Average packs/day: 0.4 packs/day for 0.8 years (0.3 ttl pk-yrs)     Types: Cigarettes     Start date: 8/14/2018     Quit date: 11/4/2018    Smokeless tobacco: Never    Tobacco comments:     Quit after 3 months   Vaping Use    Vaping status: Every Day    Substances: Nicotine, 0 anali     Devices: [employer]   Substance Use Topics    Alcohol use: Yes     Alcohol/week: 1.2 oz     Types: 2 Shots of liquor per week     Comment: occ.    Drug use: Not Currently     Types: Marijuana, Inhaled     Comment: past hx of using multiple drugs in high school       Family History:   Family History   Problem Relation Age of Onset    Cancer Mother 54        bone cancer with mets    Diabetes Mother 30        Type 2    Alcohol abuse Mother         sober 18 months    Hypertension Father     Alcohol abuse Father     Kidney Disease Father     Diabetes Sister         type 2    Genetic Disorder Sister         club foot    No Known Problems Sister     No Known Problems Brother     Lung Disease Maternal Aunt     Alcohol abuse Maternal Aunt     Cancer Maternal Grandmother         Lung    Alcohol abuse Maternal Grandmother     Diabetes Maternal Grandfather         type 2    Heart Disease Maternal Grandfather     Hypertension Maternal Grandfather     Hyperlipidemia Maternal Grandfather     Alcohol abuse Maternal Grandfather     Heart Disease Paternal Grandmother   "   Hypertension Paternal Grandmother     Hyperlipidemia Paternal Grandmother     Alcohol abuse Paternal Grandfather     Cancer Maternal great-grandmother         breast     Grandmother (maternal) - lupus  Aunt (paternal) - MS    Vitals:   Ht 1.626 m (5' 4\")   Wt 115 kg (253 lb)  Body mass index is 43.43 kg/m².    Physical Exam:   Limited by virtual visit.  However patient able to demonstrate significant joint hypermobility in her hands virtually.  Also notes some skin hyper extension which is difficult to confirm virtually.  Otherwise appears ANO x 4.    Assessment and Plan    #Joint hypermobility  #Multiple joint dislocations, intermittent  Patient with the symptoms since being a child in setting of notable family history of Nancy-Danlos syndrome and POTS and significantly elevated inflammatory markers reasonable to keep Ehler Danlos high on differential however also reasonable to rule out other autoimmune etiologies of her presentation given inflammatory nature of symptoms.  Limited in evaluation today by virtual visit.  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  From the 2017-national criterion for hypermobile Nancy-Danlos syndrome, the patient (per history, not per physical exam) meets for criteria for the following to establish diagnosis of EDS: unusually soft/velvety skin and mild skin hyperextensibility from future A; 1 or more first-degree relatives independently meeting current diagnostic criteria for EDS from feature B, MSK pain in 2 or more limbs and widespread pain for more than 3 months from feature C.    -TOM with reflex, RF, anti-CCP antibody -- rule out of autoimmune disease is required for EDS diagnosis  -Reviewed Beighton score for joint hypermobility for physical exam:  have patient place hands on floor without bending knees, hyperextend the knee by at least 10 degrees, " hyperextend elbow by at least 10 degrees, oppose thumb to volar aspect of ipsilateral forearm, passively dorsiflexed fifth met MCP joint by least 90 degrees. Each each successful task gets the patient 1 point per side (i.e. if she is able to hyperextend elbow at least 10 degrees on both the left and the right this would be 2 points total)  -Reviewed 2017 international criteria for hypermobile Nancy-Danlos syndrome with patient today but will need evaluation of arm span to height ratio at next visit. This is > or equal to 1.05 in EDS)  -Will need confirmation of the above historical findings on physical exam at next visit  -Counseled to obtain labs prior to next visit as well  -If autoimmune disease is ruled out then she will need management for her Nancy-Danlos syndrome with multidisciplinary care including cardiology, ophthalmology and behavioral medicine.  Will need full cardiovascular evaluation if this is the case including baseline echo.  May also require referral to PT/OT given her joint hypermobility.     #Iron deficiency without anemia  Likely in the setting of patient's current Crohn's diagnosis with impaired absorption.   -Ferrous gluconate every other day  -Ferritin level  -Recheck iron panel in 3 months to ensure stores are increasing    Please note that this dictation was created using voice recognition software. I have made every reasonable attempt to correct obvious errors, but I expect that there are errors of grammar and possibly content that I did not discover before finalizing the note.    Patient case was seen/ assessed/ discussed with Dr. Lois Langston, PGY-3  Internal Medicine

## 2024-05-24 LAB
CREAT UR-MCNC: 144.58 MG/DL
GAMMA INTERFERON BACKGROUND BLD IA-ACNC: 0.02 IU/ML
M TB IFN-G BLD-IMP: NEGATIVE
M TB IFN-G CD4+ BCKGRND COR BLD-ACNC: 0 IU/ML
MICROALBUMIN UR-MCNC: <1.2 MG/DL
MICROALBUMIN/CREAT UR: NORMAL MG/G (ref 0–30)
MITOGEN IGNF BCKGRD COR BLD-ACNC: 8.64 IU/ML
QFT TB2 - NIL TBQ2: 0.01 IU/ML

## 2024-06-03 ENCOUNTER — HOSPITAL ENCOUNTER (OUTPATIENT)
Dept: LAB | Facility: MEDICAL CENTER | Age: 24
End: 2024-06-03
Attending: STUDENT IN AN ORGANIZED HEALTH CARE EDUCATION/TRAINING PROGRAM
Payer: MEDICAID

## 2024-06-03 ENCOUNTER — OFFICE VISIT (OUTPATIENT)
Dept: URGENT CARE | Facility: CLINIC | Age: 24
End: 2024-06-03
Payer: MEDICAID

## 2024-06-03 VITALS
RESPIRATION RATE: 20 BRPM | SYSTOLIC BLOOD PRESSURE: 112 MMHG | DIASTOLIC BLOOD PRESSURE: 72 MMHG | HEIGHT: 64 IN | TEMPERATURE: 97.5 F | HEART RATE: 95 BPM | BODY MASS INDEX: 47.8 KG/M2 | WEIGHT: 280 LBS | OXYGEN SATURATION: 97 %

## 2024-06-03 DIAGNOSIS — E61.1 DIETARY IRON DEFICIENCY WITHOUT ANEMIA: ICD-10-CM

## 2024-06-03 DIAGNOSIS — G43.809 OTHER MIGRAINE WITHOUT STATUS MIGRAINOSUS, NOT INTRACTABLE: ICD-10-CM

## 2024-06-03 DIAGNOSIS — F41.9 ANXIETY: ICD-10-CM

## 2024-06-03 DIAGNOSIS — M24.80 GENERALIZED HYPERMOBILITY OF JOINTS: ICD-10-CM

## 2024-06-03 LAB
FERRITIN SERPL-MCNC: 39.5 NG/ML (ref 10–291)
GLUCOSE BLD-MCNC: 132 MG/DL (ref 65–99)
RHEUMATOID FACT SER IA-ACNC: <10 IU/ML (ref 0–14)

## 2024-06-03 PROCEDURE — 82728 ASSAY OF FERRITIN: CPT

## 2024-06-03 PROCEDURE — 3074F SYST BP LT 130 MM HG: CPT

## 2024-06-03 PROCEDURE — 3078F DIAST BP <80 MM HG: CPT

## 2024-06-03 PROCEDURE — 36415 COLL VENOUS BLD VENIPUNCTURE: CPT

## 2024-06-03 PROCEDURE — 86431 RHEUMATOID FACTOR QUANT: CPT

## 2024-06-03 PROCEDURE — 82962 GLUCOSE BLOOD TEST: CPT

## 2024-06-03 PROCEDURE — 99214 OFFICE O/P EST MOD 30 MIN: CPT

## 2024-06-03 RX ORDER — ACETAMINOPHEN 500 MG
500 TABLET ORAL ONCE
Status: COMPLETED | OUTPATIENT
Start: 2024-06-03 | End: 2024-06-03

## 2024-06-03 RX ORDER — DIPHENHYDRAMINE HCL 25 MG
25 CAPSULE ORAL ONCE
Status: COMPLETED | OUTPATIENT
Start: 2024-06-03 | End: 2024-06-03

## 2024-06-03 RX ORDER — ONDANSETRON 4 MG/1
4 TABLET, ORALLY DISINTEGRATING ORAL ONCE
Status: COMPLETED | OUTPATIENT
Start: 2024-06-03 | End: 2024-06-03

## 2024-06-03 RX ORDER — KETOROLAC TROMETHAMINE 30 MG/ML
15 INJECTION, SOLUTION INTRAMUSCULAR; INTRAVENOUS ONCE
Status: COMPLETED | OUTPATIENT
Start: 2024-06-03 | End: 2024-06-03

## 2024-06-03 RX ADMIN — ONDANSETRON 4 MG: 4 TABLET, ORALLY DISINTEGRATING ORAL at 10:44

## 2024-06-03 RX ADMIN — Medication 500 MG: at 10:45

## 2024-06-03 RX ADMIN — KETOROLAC TROMETHAMINE 15 MG: 30 INJECTION, SOLUTION INTRAMUSCULAR; INTRAVENOUS at 10:55

## 2024-06-03 RX ADMIN — Medication 25 MG: at 10:44

## 2024-06-03 ASSESSMENT — FIBROSIS 4 INDEX: FIB4 SCORE: 0.2

## 2024-06-03 ASSESSMENT — ENCOUNTER SYMPTOMS
TINGLING: 0
FEVER: 0
NAUSEA: 1
CHILLS: 0
DIZZINESS: 1
VOMITING: 0
COUGH: 0
BLURRED VISION: 1
HEADACHES: 1
WEAKNESS: 0
SHORTNESS OF BREATH: 0
ABDOMINAL PAIN: 0

## 2024-06-03 NOTE — PROGRESS NOTES
Chief Complaint   Patient presents with    Migraine     Woke up this morning and had trouble reading, experienced pain shortly behind her head and started getting nauseous and dizzy. Recently started taking iron tablets. Pain has been intensifying since this morning.        HISTORY OF PRESENT ILLNESS: Patient is a pleasant 23 y.o. adult who presents to urgent care today patient comes in today quite anxious and slightly tearful at times, she states she has a history of migraines, she noted this morning she had some changes to her vision and then began to have a migraine.  She states she is feeling nauseous, slightly dizzy at times.  She has not taken anything for this.  She recently started taking iron tablets, she believes this may be a contributing factor.  Patient is also quite anxious, she states her mom has diabetes she is unsure of her glucose and would like it checked this morning.  She is concerned this may be a contributing factor.    Patient Active Problem List    Diagnosis Date Noted    Housing situation unstable 04/24/2024    Chronic nausea 04/24/2024    Avoidant-restrictive food intake disorder (ARFID) 04/24/2024    Metabolic acidosis 04/24/2024    Noise-induced hearing loss of left ear 04/24/2024    Tobacco dependence 10/24/2023    History of migraine 10/04/2023    Family history of breast cancer 10/04/2023    Crohn's disease of both small and large intestine without complication ([facility]) 09/27/2023    Sleep apnea 12/08/2021    Irregular menses 11/05/2021    Borderline personality disorder (HCC) 09/25/2021    Suicidal behavior with attempted self-injury ([facility]) 09/24/2021    Gastroesophageal reflux disease without esophagitis 11/04/2020    Chronic pain of left ankle 11/04/2020    Major depressive disorder with psychotic features ([facility]) 11/04/2020    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 36 Tablet 1    omeprazole (PRILOSEC) 20 MG delayed-release capsule Take 1 Capsule by mouth every day. 30 Capsule 2    ondansetron (ZOFRAN ODT) 4 MG TABLET DISPERSIBLE Dissolve 1 Tablet by mouth every 8 hours as needed for Nausea/Vomiting. 30 Tablet 0    albuterol 108 (90 Base) MCG/ACT Aero Soln inhalation aerosol Inhale 2 Puffs every 6 hours as needed for Shortness of Breath. 8.5 g 0     No current Epic-ordered facility-administered medications on file.       Past Medical History:   Diagnosis Date    Allergy     Apples cause migrains and fainting    Anxiety     Borderline personality disorder ([facility])     Depression     Family history of diabetes mellitus 11/04/2020    GERD (gastroesophageal reflux disease)     Head ache     Mast cell activation syndrome ([facility])     Migraine     POTS (postural orthostatic tachycardia syndrome)     Substance abuse ([facility])     xanax and alcohol in highschool    Suicidal ideation 09/24/2021    Type 2 diabetes, diet controlled ([facility]) 10/04/2023    History of A1c's up to 8.3  Patient checks home BG twice daily  Controls with diet  Has not had A1c in over a year       UGIB (upper gastrointestinal bleed) 10/23/2023       Social History     Tobacco Use    Smoking status: Former     Current packs/day: 0.50     Average packs/day: 0.4 packs/day for 0.9 years (0.3 ttl pk-yrs)     Types: Cigarettes     Start date: 8/14/2018     Quit date: 11/4/2018    Smokeless tobacco: Never    Tobacco comments:     Quit after 3 months   Vaping Use    Vaping status: Every Day    Substances: Nicotine, 0 anali     Devices: [employer]   Substance Use Topics    Alcohol use: Yes     Alcohol/week: 1.2  oz     Types: 2 Shots of liquor per week     Comment: occ.    Drug use: Not Currently     Types: Marijuana, Inhaled     Comment: past hx of using multiple drugs in high school       Family Status   Relation Name Status    Mo      Fa  Alive    Sis Bridget Alive    Sis Deepika Alive    Bro  Alive    MAunt      MGMo   at age 62        Lung cancer    MGFa  Alive    PGMo  Alive    PGFa      mggmo       Family History   Problem Relation Age of Onset    Cancer Mother 54        bone cancer with mets    Diabetes Mother 30        Type 2    Alcohol abuse Mother         sober 18 months    Hypertension Father     Alcohol abuse Father     Kidney Disease Father     Diabetes Sister         type 2    Genetic Disorder Sister         club foot    No Known Problems Sister     No Known Problems Brother     Lung Disease Maternal Aunt     Alcohol abuse Maternal Aunt     Cancer Maternal Grandmother         Lung    Alcohol abuse Maternal Grandmother     Diabetes Maternal Grandfather         type 2    Heart Disease Maternal Grandfather     Hypertension Maternal Grandfather     Hyperlipidemia Maternal Grandfather     Alcohol abuse Maternal Grandfather     Heart Disease Paternal Grandmother     Hypertension Paternal Grandmother     Hyperlipidemia Paternal Grandmother     Alcohol abuse Paternal Grandfather     Cancer Maternal great-grandmother         breast       Review of Systems   Constitutional:  Negative for chills, fever and malaise/fatigue.   Eyes:  Positive for blurred vision.   Respiratory:  Negative for cough and shortness of breath.    Cardiovascular:  Negative for chest pain.   Gastrointestinal:  Positive for nausea. Negative for abdominal pain and vomiting.   Genitourinary:  Negative for dysuria, frequency and urgency.   Neurological:  Positive for dizziness and headaches. Negative for tingling and weakness.       Exam:  /72 (BP Location: Right arm, Patient Position: Sitting, BP Cuff Size:  "Adult long)   Pulse 95   Temp 36.4 °C (97.5 °F) (Temporal)   Resp 20   Ht 1.626 m (5' 4\")   Wt (!) 127 kg (280 lb)   SpO2 97%   Physical Exam  Constitutional:       Appearance: Normal appearance.   HENT:      Head: Normocephalic.      Right Ear: Tympanic membrane and ear canal normal. There is no impacted cerumen.      Left Ear: Tympanic membrane and ear canal normal. There is no impacted cerumen.      Nose: No congestion.      Mouth/Throat:      Mouth: Mucous membranes are moist.      Pharynx: Oropharynx is clear. No oropharyngeal exudate or posterior oropharyngeal erythema.   Eyes:      General:         Right eye: No discharge.         Left eye: No discharge.      Extraocular Movements: Extraocular movements intact.      Conjunctiva/sclera: Conjunctivae normal.      Pupils: Pupils are equal, round, and reactive to light.   Cardiovascular:      Rate and Rhythm: Normal rate and regular rhythm.      Pulses: Normal pulses.      Heart sounds: Normal heart sounds. No murmur heard.  Pulmonary:      Effort: Pulmonary effort is normal. No respiratory distress.      Breath sounds: Normal breath sounds. No stridor. No wheezing or rhonchi.   Chest:      Chest wall: No tenderness.   Abdominal:      General: Abdomen is flat. Bowel sounds are normal.      Palpations: Abdomen is soft. There is no mass.      Tenderness: There is no right CVA tenderness or left CVA tenderness.   Musculoskeletal:         General: No swelling, tenderness, deformity or signs of injury. Normal range of motion.      Cervical back: Normal range of motion.      Right lower leg: No edema.      Left lower leg: No edema.   Lymphadenopathy:      Cervical: No cervical adenopathy.   Skin:     General: Skin is warm and dry.      Capillary Refill: Capillary refill takes less than 2 seconds.      Findings: No bruising or rash.   Neurological:      General: No focal deficit present.      Mental Status: Sherry is alert.      GCS: GCS eye subscore is 4. GCS " verbal subscore is 5. GCS motor subscore is 6.      Cranial Nerves: No dysarthria or facial asymmetry.      Sensory: Sensation is intact. No sensory deficit.      Motor: No weakness.      Coordination: Romberg sign negative. Coordination normal.      Gait: Gait normal.   Psychiatric:         Mood and Affect: Mood normal.         Behavior: Behavior normal.         Thought Content: Thought content normal.         Judgment: Judgment normal.           Assessment/Plan:  1. Other migraine without status migrainosus, not intractable  - ketorolac (Toradol) injection 15 mg  - acetaminophen (Tylenol) tablet 500 mg  - ondansetron (Zofran ODT) dispertab 4 mg  - diphenhydrAMINE (Benadryl) capsule 25 mg    2. Anxiety  - POCT Glucose    Based on physical exam along with review of systems I did do a thorough neuroexam today on the patient.  This appears within normal limits, pupils are round equal and reactive to light,  are equal, no noted facial droop, no slurring of her speech.  Patient is able to tell the difference between sharp and dull to all areas.  Cardiac exam appears within normal limits, she appears otherwise quite stable.  Patient has not taken anything for her migraines, will attempt Toradol, Tylenol, Zofran and Benadryl.  While in the room patient is quite concerned she may have developed diabetes, she is requesting her glucose to be checked, she is quite tearful and appears very anxious here in the office today.  POCT glucose complete, 132, remained within normal limits.  Encouraged plenty of fluids, Motrin and Tylenol 6 hours from now.  Should patient's pain continue to get worse with no relief from the medications provided here in the office today she was advised to seek further evaluation in the ER. Total time spent with the patient 35 minutes to include, review of chart, charting, assessment, procedure.    Supportive care, differential diagnoses, and indications for immediate follow-up discussed with  patient.   Pathogenesis of diagnosis discussed including typical length and natural progression.   Instructed to return to clinic or nearest emergency department for any change in condition, further concerns, or worsening of symptoms.  Patient states understanding of the plan of care and discharge instructions.  Instructed to make an appointment, for follow up, with primary care provider.    Please note that this dictation was created using voice recognition software. I have made every reasonable attempt to correct obvious errors, but I expect that there are errors of grammar and possibly content that I did not discover before finalizing the note.      Meaghan MARI

## 2024-06-05 ENCOUNTER — APPOINTMENT (OUTPATIENT)
Dept: INTERNAL MEDICINE | Facility: OTHER | Age: 24
End: 2024-06-05
Payer: MEDICAID

## 2024-06-05 VITALS
SYSTOLIC BLOOD PRESSURE: 110 MMHG | TEMPERATURE: 97.8 F | OXYGEN SATURATION: 97 % | HEART RATE: 93 BPM | BODY MASS INDEX: 48.49 KG/M2 | HEIGHT: 64 IN | WEIGHT: 284 LBS | DIASTOLIC BLOOD PRESSURE: 66 MMHG

## 2024-06-05 DIAGNOSIS — F50.82 AVOIDANT-RESTRICTIVE FOOD INTAKE DISORDER (ARFID): ICD-10-CM

## 2024-06-05 DIAGNOSIS — Q66.02 CONGENITAL TALIPES EQUINOVARUS DEFORMITY OF LEFT FOOT: ICD-10-CM

## 2024-06-05 DIAGNOSIS — Z23 NEED FOR TDAP VACCINATION: ICD-10-CM

## 2024-06-05 DIAGNOSIS — Z80.3 FAMILY HISTORY OF BREAST CANCER: ICD-10-CM

## 2024-06-05 DIAGNOSIS — D50.0 IRON DEFICIENCY ANEMIA DUE TO CHRONIC BLOOD LOSS: ICD-10-CM

## 2024-06-05 DIAGNOSIS — Z00.00 PREVENTATIVE HEALTH CARE: ICD-10-CM

## 2024-06-05 DIAGNOSIS — K50.80 CROHN'S DISEASE OF BOTH SMALL AND LARGE INTESTINE WITHOUT COMPLICATION (HCC): Primary | ICD-10-CM

## 2024-06-05 DIAGNOSIS — H90.3 SENSORINEURAL HEARING LOSS (SNHL) OF BOTH EARS: ICD-10-CM

## 2024-06-05 DIAGNOSIS — R11.0 CHRONIC NAUSEA: ICD-10-CM

## 2024-06-05 DIAGNOSIS — Z23 NEED FOR PNEUMOCOCCAL VACCINATION: ICD-10-CM

## 2024-06-05 PROCEDURE — 99213 OFFICE O/P EST LOW 20 MIN: CPT | Mod: GE,25

## 2024-06-05 PROCEDURE — 99999 PR NO CHARGE: CPT | Mod: GC | Performed by: INTERNAL MEDICINE

## 2024-06-05 PROCEDURE — 90472 IMMUNIZATION ADMIN EACH ADD: CPT | Mod: GE | Performed by: INTERNAL MEDICINE

## 2024-06-05 PROCEDURE — 3078F DIAST BP <80 MM HG: CPT

## 2024-06-05 PROCEDURE — 90677 PCV20 VACCINE IM: CPT | Mod: GE | Performed by: INTERNAL MEDICINE

## 2024-06-05 PROCEDURE — 90715 TDAP VACCINE 7 YRS/> IM: CPT | Mod: GE | Performed by: INTERNAL MEDICINE

## 2024-06-05 PROCEDURE — 3074F SYST BP LT 130 MM HG: CPT

## 2024-06-05 PROCEDURE — 90471 IMMUNIZATION ADMIN: CPT | Mod: GE | Performed by: INTERNAL MEDICINE

## 2024-06-05 ASSESSMENT — FIBROSIS 4 INDEX: FIB4 SCORE: 0.2

## 2024-06-05 NOTE — PROGRESS NOTES
Teaching Physician Attestation      Level of Participation    I discussed with the resident physician the patient's history, exam, assessment and plan in detail.  Topics listed in my addendum were the focus of the visit.  Healthcare maintenance was not addressed this visit unless listed as a topic in my addendum.  I agree with plan as written along with the following additions/modifications:    HM (partially addressed)  -tdap and pna (vaping) immunizations today  -encouraged pending labs  -hiv, hep c, hep b, syphilis screens added today, would have dedicated women's health visit for f/u.    Gi f/u upcoming for Crohn's and GERD, appreciate support    Continuing iron supplemenation for microcytosis w/o anemia likely c/w isaura in interim in setting of Crohn's, would inquire re menstrual bleeding at f/u, although no recent anemia so blood loss is mild.    ? Erher's Danlos or connective tissue dz  -labs still pending for ct dz workup, also ortho following, appreciate support    Appreciate therapy support for eating in setting of Crohn's disease, recent lytes nl with PO intake.    Appreciate audiology support for hearing loss    Needs dedicated f/u to why has albuterol.    Rtc 1 mo for new res pcp.

## 2024-06-05 NOTE — PROGRESS NOTES
Established Patient    Patient Care Team:  Zully Medina M.D. as PCP - General (Internal Medicine)    Katie Graham is a 23 y.o. adult who presents today with the following Chief Complaint(s): Follow up for The primary encounter diagnosis was Crohn's disease of both small and large intestine without complication (HCC). 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    HPI:  Problem   Crohn's Disease of Both Small and Large Intestine Without Complication (Hcc)    Reports long time history of abnormal bowel movements and hematemesis since childhood.    Diagnosed 9/2023 on the inpatient setting with colonscopy findings grossly concerning for Crohn's, subsequently confirmed on pathology.    She was treated in October 2023 outpatient with steroid taper with improvement though continued to have ongoing active flares with multiple hospitalizations.     In October 2023 she was hospitalized with upper GIB and underwent EGD which showed 1 erosive, non bleeding ulcer.     Most recently she has been having formed 1 bowel movement per day with no blood. She states her symptoms have greatly improved after she has essentially stopped eating and is only consuming Ensures and Pedialyte. She reports continues to have some mild bloating though no more cramping since stopping solid food in early April.    She has not yet been able to establish with GI due to unstable housing and difficulty with physical mobility from her club foot deformity.     Has upcoming appointment with GI in August.       Chronic Nausea    Is having nausea everyday usually right before bed when she lays down  associated with acid reflux. Denies emesis.   Has stopped eating 3 hrs prior to sleep with improvement, though will have nausea in morning when first waking up. Is taking zofran a couple times/week.      Avoidant-Restrictive Food Intake Disorder (Arfid)    Patient reports this recently came up after discussion with her therapist. She reports developing a fear of food with behaviors of severely limiting her food intake in response to her Crohn's symptoms. Has been working with her therapist to re-introduce foods. Doing well.      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  Continues to have issues with this and uses a cane to ambulate.  Following with [facility], last seen 5/29  She is pending radiologic assisted injection to the subtalar joint for diagnostic purposes to evaluate if surgery would be indicated.     Sensorineural Hearing Loss (Snhl) of Both Ears    Patient with moderate to severe hearing loss in R ear and moderate to profound in the left ear. Reportedly has history of hearing loss in childhood and had hearing aids at age 6 or 7 per family report.     Has established with audiology and is pending hearing aids.      Iron Deficiency Anemia    Previously noted to have low to low normal iron levels in the setting of her previously active flare of Crohn's disease.  Iron studies 5/2024 with low iron levels, ferritin low/normal.  Patient has been taking an iron supplement every Monday Wednesday Friday and tolerating it well.        Additionally patient was evaluated by my colleague Dr. Cooney for possible diagnosis of Nancy-Danlos.  She is pending completing full workup for this.    ROS:     Denies any new chest pain or shortness of breath.  No changes to urinary or bowel function.   See HPI.    Past Medical History:   Diagnosis Date    Allergy     Apples cause migrains and fainting    Anxiety     Borderline personality  disorder ([facility])     Depression     Family history of diabetes mellitus 11/04/2020    GERD (gastroesophageal reflux disease)     Head ache     Mast cell activation syndrome ([facility])     Migraine     POTS (postural orthostatic tachycardia syndrome)     Substance abuse ([facility])     xanax and alcohol in highschool    Suicidal ideation 09/24/2021    Type 2 diabetes, diet controlled ([facility]) 10/04/2023    History of A1c's up to 8.3  Patient checks home BG twice daily  Controls with diet  Has not had A1c in over a year       UGIB (upper gastrointestinal bleed) 10/23/2023     Social History     Tobacco Use    Smoking status: Former     Current packs/day: 0.50     Average packs/day: 0.4 packs/day for 0.9 years (0.3 ttl pk-yrs)     Types: Cigarettes     Start date: 8/14/2018     Quit date: 11/4/2018    Smokeless tobacco: Never    Tobacco comments:     Quit after 3 months   Vaping Use    Vaping status: Every Day    Substances: Nicotine, 0 anali     Devices: [employer]   Substance Use Topics    Alcohol use: Not Currently     Alcohol/week: 1.2 oz     Types: 2 Shots of liquor per week     Comment: occ.    Drug use: Not Currently     Types: Marijuana, Inhaled     Comment: past hx of using multiple drugs in high school     Current Outpatient Medications   Medication Sig Dispense Refill    ferrous gluconate (FERGON) 324 (38 Fe) MG Tab Take 1 Tablet by mouth every Monday, Wednesday, and Friday for 168 days. 36 Tablet 1    omeprazole (PRILOSEC) 20 MG delayed-release capsule Take 1 Capsule by mouth every day. 30 Capsule 2    ondansetron (ZOFRAN ODT) 4 MG TABLET DISPERSIBLE Dissolve 1 Tablet by mouth every 8 hours as needed for Nausea/Vomiting. 30 Tablet 0    albuterol 108 (90 Base) MCG/ACT Aero Soln inhalation aerosol Inhale 2 Puffs every 6 hours as needed for Shortness of Breath. 8.5 g 0     No current facility-administered medications for this visit.     Physical Exam:  /66 (BP Location: Right arm, Patient Position: Sitting, BP Cuff  "Size: Large adult)   Pulse 93   Temp 36.6 °C (97.8 °F) (Temporal)   Ht 1.626 m (5' 4\")   Wt (!) 129 kg (284 lb)   SpO2 97%   BMI 48.75 kg/m²   General: Well developed, well nourished adult, in no distress.  Eyes: Conjuntiva without any obvious injection or erythema.   Cardiovascular: Heart is regular with out murmur  Lungs: Clear to auscultation bilaterally. No wheezes, rhonci or crackles heard. Respiratory effort is normal.  Abd: Soft, non-tender  Ext: No edema      Assessment and Plan:   1. Crohn's disease of both small and large intestine without complication (HCC)  2.  Iron deficiency  Symptoms currently stable.  No evidence of active flare or bleeding.  She is pending establishing with GI, appreciate their support in guiding future treatment plan.  Alarm precautions reviewed with patient that would warrant urgent or emergent evaluation.  Continue iron supplementation.    2. Chronic nausea  Improved from prior.  Symptoms controlled with as needed Zofran a couple times a week.    3. Sensorineural hearing loss (SNHL) of both ears  Following with audiology and pending getting hearing aids, appreciate their support.    4. Avoidant-restrictive food intake disorder (ARFID)  Improvement/resolution with ongoing therapy.  No evidence of weight loss.    5. 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    6. Preventative health care  7. Family history of breast cancer  8. Need for Tdap vaccination  9. Need for pneumococcal vaccination    Patient is need of an annual wellness exam for a comprehensive assessment of her preventative health care items.  She is due for a Pap smear, which of note she has some trauma from a previous experience.  She is sexually active and is due for routine STI screening.  Have ordered the appropriate workup below and will plan to have discussion on safe sex practices at a future annual wellness visit.  Additionally she reports " a family history of breast cancer.  This will need to be further explored at future visit and possible genetic screening if indicated.      - HIV AG/AB COMBO ASSAY SCREENING; Future  - HEP C VIRUS ANTIBODY; Future  - T.Pallidum AB BETINA (Screening); Future  - HEP B Surface Antibody; Future  - Hep B Core AB Total; Future  - Hep B Surface Antigen; Future  - Chlamydia/GC, PCR (Urine); Future    - Tdap Vaccine =>6YO IM  - Pneumococcal Conjugate Vaccine 20-Valent (6 wks+)    10. Asthma?  Patient is on albuterol as needed for reported history of asthma.  There are no alarm features present today and she is not using her inhaler frequently.  Will plan to have a dedicated discussion about her diagnosis of asthma at a future visit.    11.  Nancy-Danlos?  Advised patient to get lab work to complete workup for this.  Will follow-up results.    Return in about 4 weeks (around 7/3/2024) for re-establish and lab follow up.    Patient Instructions   Good to see you today Sherry!    Glad you have been able to get in for your labs and follow up appointments.    Please be sure to follow up with GI in August.    I have sent additional labs for you to get with the ones that were previously ordered.    You will need a follow up in the next few weeks to re-establish with a new PCP.     After re-establishing you should have have a dedicated visit to address your history of asthma and a women's health visit for a PAP and to review other preventative medicine.     It has been a pleasure caring for you!    Follow up in 4-6 weeks to re-establish, sooner if needed.     Zully Medina M.D. PGY3  [facility]

## 2024-06-05 NOTE — PATIENT INSTRUCTIONS
Good to see you today Sherry!    Glad you have been able to get in for your labs and follow up appointments.    Please be sure to follow up with GI in August.    I have sent additional labs for you to get with the ones that were previously ordered.    You will need a follow up in the next few weeks to re-establish with a new PCP.     After re-establishing you should have have a dedicated visit to address your history of asthma and a women's health visit for a PAP and to review other preventative medicine.     It has been a pleasure caring for you!    Follow up in 4-6 weeks to re-establish, sooner if needed.

## 2024-06-06 ENCOUNTER — NON-PROVIDER VISIT (OUTPATIENT)
Dept: INTERNAL MEDICINE | Facility: OTHER | Age: 24
End: 2024-06-06
Payer: MEDICAID

## 2024-06-06 VITALS — WEIGHT: 284 LBS | BODY MASS INDEX: 48.75 KG/M2

## 2024-06-06 DIAGNOSIS — K50.00 CROHN'S DISEASE OF SMALL INTESTINE WITHOUT COMPLICATION (HCC): ICD-10-CM

## 2024-06-06 PROCEDURE — 97803 MED NUTRITION INDIV SUBSEQ: CPT | Performed by: DIETITIAN, REGISTERED

## 2024-06-06 ASSESSMENT — FIBROSIS 4 INDEX: FIB4 SCORE: 0.2

## 2024-06-06 NOTE — PATIENT INSTRUCTIONS
Goals:  Use the handout we reviewed today to help in giving you ideas on what you can add to your diet that should work for you. But with anything, monitor your symptoms and if you feel like you are entering a flare up than back off the fiber and stay hydrated  Switch to regular IV hydration or try the oral hydration solution on the handout  Consider taking your iron with food if you continue to not tolerate it and it upsets your stomach and let your doctor  Call if you need me at all to get an appointment

## 2024-06-06 NOTE — PROGRESS NOTES
"Sherry is 23 year old here for follow up from nutrition counseling/dietitian assessment for Crohn's disease in December 2023 where I saw Sherry first (aka Fernando aklucio Cee)    Prev saw her in December 2023     Sherry is in a girls group to live with and struggling with figuring out what to eat  Getting some conflicting information from others in the house and online     She is no longer in a flare up and feeling better  Trying to stay away from gluten to help her maintain her health   Using protein shakes at home - using Equate (160 calories, 30 grams)    Noticing she is not in pain as much as she did before  Founds her stomach is loud   Still dealing with intermittent diarrhea - \"every other day\"  Taking iron supplements   Finds she does not tolerate greek yogurt, but milk is ok     Today we worked through a detailed educational handout reviewing foods to choose more often and foods to limit with Crohn's disease. Supported her with ways to slowly add in new foods that she is unsure of how she will tolerate and how to back off on the fiber if she feels she is in the beginning stages of a flare up in the future. Encouraged that she not do the sugar free hydration packets and to do the regular ones or use the recipe I provided her for an oral rehydration solution drink.   She also reports stomach upset with her iron supplements so recommend consideration for her taking it with food and following up her labs to see if she can better tolerate them to help with adherence.     Set goals; rtc with RD as needed per her request.     Time spent: 45 minutes      "

## 2024-06-07 PROBLEM — D50.9 IRON DEFICIENCY ANEMIA: Status: ACTIVE | Noted: 2024-06-07

## 2024-07-03 ENCOUNTER — OFFICE VISIT (OUTPATIENT)
Dept: INTERNAL MEDICINE | Facility: OTHER | Age: 24
End: 2024-07-03
Payer: MEDICAID

## 2024-07-03 VITALS
WEIGHT: 287.6 LBS | SYSTOLIC BLOOD PRESSURE: 121 MMHG | OXYGEN SATURATION: 96 % | HEIGHT: 64 IN | TEMPERATURE: 96.9 F | HEART RATE: 90 BPM | DIASTOLIC BLOOD PRESSURE: 84 MMHG | BODY MASS INDEX: 49.1 KG/M2

## 2024-07-03 DIAGNOSIS — J18.9 PNEUMONIA OF LEFT LOWER LOBE DUE TO INFECTIOUS ORGANISM: ICD-10-CM

## 2024-07-03 DIAGNOSIS — F33.1 MODERATE EPISODE OF RECURRENT MAJOR DEPRESSIVE DISORDER (HCC): ICD-10-CM

## 2024-07-03 DIAGNOSIS — K21.9 GASTROESOPHAGEAL REFLUX DISEASE WITHOUT ESOPHAGITIS: ICD-10-CM

## 2024-07-03 DIAGNOSIS — N92.6 IRREGULAR MENSES: ICD-10-CM

## 2024-07-03 DIAGNOSIS — F60.3 BORDERLINE PERSONALITY DISORDER (HCC): ICD-10-CM

## 2024-07-03 DIAGNOSIS — E11.9 DIABETES MELLITUS TYPE 2, DIET-CONTROLLED (HCC): ICD-10-CM

## 2024-07-03 DIAGNOSIS — F41.1 GENERALIZED ANXIETY DISORDER: ICD-10-CM

## 2024-07-03 DIAGNOSIS — K50.80 CROHN'S DISEASE OF BOTH SMALL AND LARGE INTESTINE WITHOUT COMPLICATION (HCC): ICD-10-CM

## 2024-07-03 PROCEDURE — 99215 OFFICE O/P EST HI 40 MIN: CPT | Mod: GC

## 2024-07-03 PROCEDURE — 3074F SYST BP LT 130 MM HG: CPT | Mod: GC

## 2024-07-03 PROCEDURE — 3079F DIAST BP 80-89 MM HG: CPT | Mod: GC

## 2024-07-03 RX ORDER — ONDANSETRON 4 MG/1
4 TABLET, ORALLY DISINTEGRATING ORAL EVERY 8 HOURS PRN
Qty: 30 TABLET | Refills: 0 | Status: SHIPPED | OUTPATIENT
Start: 2024-07-03

## 2024-07-03 RX ORDER — ONDANSETRON 4 MG/1
4 TABLET, ORALLY DISINTEGRATING ORAL EVERY 8 HOURS PRN
Qty: 90 TABLET | Refills: 1 | Status: SHIPPED | OUTPATIENT
Start: 2024-07-03 | End: 2024-07-03

## 2024-07-03 RX ORDER — ALBUTEROL SULFATE 90 UG/1
2 AEROSOL, METERED RESPIRATORY (INHALATION) EVERY 6 HOURS PRN
Qty: 8.5 G | Refills: 1 | Status: SHIPPED | OUTPATIENT
Start: 2024-07-03

## 2024-07-03 RX ORDER — EPINEPHRINE 0.3 MG/.3ML
INJECTION SUBCUTANEOUS
Qty: 1 EACH | Refills: 0 | Status: SHIPPED | OUTPATIENT
Start: 2024-07-03 | End: 2024-07-03

## 2024-07-03 RX ORDER — EPINEPHRINE 0.3 MG/.3ML
INJECTION SUBCUTANEOUS
Qty: 1 EACH | Refills: 1 | Status: SHIPPED | OUTPATIENT
Start: 2024-07-03

## 2024-07-03 ASSESSMENT — ENCOUNTER SYMPTOMS
FEVER: 0
BLOOD IN STOOL: 0
CHILLS: 0
SHORTNESS OF BREATH: 0
HEARTBURN: 1
CONSTIPATION: 0
NAUSEA: 1
DEPRESSION: 1
PALPITATIONS: 1
DIARRHEA: 1
WHEEZING: 0
VOMITING: 0
NERVOUS/ANXIOUS: 1
COUGH: 0
WEIGHT LOSS: 0
ABDOMINAL PAIN: 0

## 2024-07-03 ASSESSMENT — FIBROSIS 4 INDEX: FIB4 SCORE: 0.21

## 2024-07-08 ENCOUNTER — PATIENT OUTREACH (OUTPATIENT)
Dept: INTERNAL MEDICINE | Facility: OTHER | Age: 24
End: 2024-07-08

## 2024-07-08 NOTE — PROGRESS NOTES
Patient was referred to TAMIKA Arthur for issues with transportation and medication costs.    Patient lives in a group home and has mobility issues, multiple health concerns, and hearing issues. Patient was pleasant and is very proactive in finding resources for her needs. Patient expressed concerns about cost for medications, and logistics getting to appointments as [employer] will no longer take her because she is located in an [facility] service area.     TAMIKA Intern called and spoke to patient who expressed concern with the cost of a prescribed epi-pen. Patient is waiting to hear from her pharmacy about what the cost of the medication will be, and TAMIKA Intern emailed patient a coupon from [employer] with a reduced rate. Patient will call/email TAMIKA Intern about what the cost will be and we will reassess once we know. TAMIKA Arthur called and spoke with [facility] who stated that the patient would need to call to discuss her current application status and sign up for an assessment. TAMIKA Arthur gave patient direct number to [facility] ([phone]), and she said she would call them.

## 2024-07-12 ENCOUNTER — TELEPHONE (OUTPATIENT)
Dept: INTERNAL MEDICINE | Facility: OTHER | Age: 24
End: 2024-07-12
Payer: MEDICAID

## 2024-07-16 ENCOUNTER — APPOINTMENT (OUTPATIENT)
Dept: TELEHEALTH | Facility: TELEMEDICINE | Age: 24
End: 2024-07-16
Payer: MEDICAID

## 2024-07-16 ENCOUNTER — TELEPHONE (OUTPATIENT)
Dept: INTERNAL MEDICINE | Facility: OTHER | Age: 24
End: 2024-07-16

## 2024-07-17 ENCOUNTER — TELEPHONE (OUTPATIENT)
Dept: INTERNAL MEDICINE | Facility: OTHER | Age: 24
End: 2024-07-17
Payer: MEDICAID

## 2024-07-18 ENCOUNTER — TELEPHONE (OUTPATIENT)
Dept: INTERNAL MEDICINE | Facility: OTHER | Age: 24
End: 2024-07-18

## 2024-07-18 ENCOUNTER — OFFICE VISIT (OUTPATIENT)
Dept: INTERNAL MEDICINE | Facility: OTHER | Age: 24
End: 2024-07-18
Payer: MEDICAID

## 2024-07-18 VITALS
HEIGHT: 64 IN | WEIGHT: 289.8 LBS | BODY MASS INDEX: 49.47 KG/M2 | OXYGEN SATURATION: 97 % | DIASTOLIC BLOOD PRESSURE: 73 MMHG | TEMPERATURE: 98.3 F | HEART RATE: 88 BPM | SYSTOLIC BLOOD PRESSURE: 113 MMHG

## 2024-07-18 DIAGNOSIS — R19.7 ACUTE DIARRHEA: ICD-10-CM

## 2024-07-18 DIAGNOSIS — K92.0 HEMATEMESIS WITH NAUSEA: ICD-10-CM

## 2024-07-18 DIAGNOSIS — K92.1 BLOODY STOOL: ICD-10-CM

## 2024-07-18 DIAGNOSIS — K50.80 CROHN'S DISEASE OF BOTH SMALL AND LARGE INTESTINE WITHOUT COMPLICATION (HCC): ICD-10-CM

## 2024-07-18 PROCEDURE — 99213 OFFICE O/P EST LOW 20 MIN: CPT | Mod: GE

## 2024-07-18 RX ORDER — PROMETHAZINE HYDROCHLORIDE 25 MG/1
25 TABLET ORAL EVERY 6 HOURS PRN
Qty: 12 TABLET | Refills: 0 | Status: SHIPPED | OUTPATIENT
Start: 2024-07-18 | End: 2024-07-21

## 2024-07-18 RX ORDER — PREDNISONE 10 MG/1
TABLET ORAL
Qty: 74 TABLET | Refills: 0 | Status: SHIPPED | OUTPATIENT
Start: 2024-07-18 | End: 2024-07-19

## 2024-07-18 ASSESSMENT — FIBROSIS 4 INDEX: FIB4 SCORE: 0.21

## 2024-07-19 RX ORDER — PREDNISONE 10 MG/1
40 TABLET ORAL DAILY
Qty: 30 TABLET | Refills: 3 | Status: SHIPPED | OUTPATIENT
Start: 2024-07-19

## 2024-07-19 ASSESSMENT — ENCOUNTER SYMPTOMS
DIARRHEA: 1
EYES NEGATIVE: 1
CHILLS: 1
CARDIOVASCULAR NEGATIVE: 1
BLOOD IN STOOL: 1
NAUSEA: 1
ABDOMINAL PAIN: 1
WEAKNESS: 1
DIZZINESS: 1
RESPIRATORY NEGATIVE: 1
VOMITING: 1

## 2024-07-31 ENCOUNTER — APPOINTMENT (OUTPATIENT)
Dept: RADIOLOGY | Facility: MEDICAL CENTER | Age: 24
End: 2024-07-31
Attending: ORTHOPAEDIC SURGERY
Payer: MEDICAID

## 2024-08-14 ENCOUNTER — HOSPITAL ENCOUNTER (OUTPATIENT)
Dept: LAB | Facility: MEDICAL CENTER | Age: 24
End: 2024-08-14
Payer: MEDICAID

## 2024-08-14 ENCOUNTER — HOSPITAL ENCOUNTER (OUTPATIENT)
Dept: LAB | Facility: MEDICAL CENTER | Age: 24
End: 2024-08-14
Attending: STUDENT IN AN ORGANIZED HEALTH CARE EDUCATION/TRAINING PROGRAM
Payer: MEDICAID

## 2024-08-14 ENCOUNTER — HOSPITAL ENCOUNTER (OUTPATIENT)
Dept: LAB | Facility: MEDICAL CENTER | Age: 24
End: 2024-08-14
Attending: NURSE PRACTITIONER
Payer: MEDICAID

## 2024-08-14 DIAGNOSIS — K50.80 CROHN'S DISEASE OF BOTH SMALL AND LARGE INTESTINE WITHOUT COMPLICATION (HCC): ICD-10-CM

## 2024-08-14 DIAGNOSIS — Z00.00 PREVENTATIVE HEALTH CARE: ICD-10-CM

## 2024-08-14 DIAGNOSIS — E11.9 DIABETES MELLITUS TYPE 2, DIET-CONTROLLED (HCC): ICD-10-CM

## 2024-08-14 LAB
ALBUMIN SERPL BCP-MCNC: 4.1 G/DL (ref 3.2–4.9)
ALBUMIN/GLOB SERPL: 1.1 G/DL
ALP SERPL-CCNC: 75 U/L (ref 30–99)
ALT SERPL-CCNC: 35 U/L (ref 2–50)
ANION GAP SERPL CALC-SCNC: 10 MMOL/L (ref 7–16)
AST SERPL-CCNC: 22 U/L (ref 12–45)
BASOPHILS # BLD AUTO: 0.6 % (ref 0–1.8)
BASOPHILS # BLD: 0.05 K/UL (ref 0–0.12)
BILIRUB SERPL-MCNC: 0.3 MG/DL (ref 0.1–1.5)
BUN SERPL-MCNC: 10 MG/DL (ref 8–22)
CALCIUM ALBUM COR SERPL-MCNC: 9 MG/DL (ref 8.5–10.5)
CALCIUM SERPL-MCNC: 9.1 MG/DL (ref 8.5–10.5)
CHLORIDE SERPL-SCNC: 103 MMOL/L (ref 96–112)
CO2 SERPL-SCNC: 22 MMOL/L (ref 20–33)
CREAT SERPL-MCNC: 0.72 MG/DL (ref 0.5–1.4)
EOSINOPHIL # BLD AUTO: 0.13 K/UL (ref 0–0.51)
EOSINOPHIL NFR BLD: 1.6 % (ref 0–6.9)
ERYTHROCYTE [DISTWIDTH] IN BLOOD BY AUTOMATED COUNT: 46.5 FL (ref 35.9–50)
EST. AVERAGE GLUCOSE BLD GHB EST-MCNC: 148 MG/DL
GFR SERPLBLD CREATININE-BSD FMLA CKD-EPI: 121 ML/MIN/1.73 M 2
GLOBULIN SER CALC-MCNC: 3.7 G/DL (ref 1.9–3.5)
GLUCOSE SERPL-MCNC: 107 MG/DL (ref 65–99)
HBA1C MFR BLD: 6.8 % (ref 4–5.6)
HBV CORE AB SERPL QL IA: NONREACTIVE
HBV SURFACE AB SERPL IA-ACNC: <3.5 MIU/ML (ref 0–10)
HBV SURFACE AG SER QL: NORMAL
HCT VFR BLD AUTO: 40.9 % (ref 37–47)
HCV AB SER QL: REACTIVE
HGB BLD-MCNC: 13.2 G/DL (ref 12–16)
HIV 1+2 AB+HIV1 P24 AG SERPL QL IA: NORMAL
IMM GRANULOCYTES # BLD AUTO: 0.02 K/UL (ref 0–0.11)
IMM GRANULOCYTES NFR BLD AUTO: 0.3 % (ref 0–0.9)
LYMPHOCYTES # BLD AUTO: 2.22 K/UL (ref 1–4.8)
LYMPHOCYTES NFR BLD: 27.9 % (ref 22–41)
MAGNESIUM SERPL-MCNC: 2 MG/DL (ref 1.5–2.5)
MCH RBC QN AUTO: 26.7 PG (ref 27–33)
MCHC RBC AUTO-ENTMCNC: 32.3 G/DL (ref 32.2–35.5)
MCV RBC AUTO: 82.8 FL (ref 81.4–97.8)
MONOCYTES # BLD AUTO: 0.56 K/UL (ref 0–0.85)
MONOCYTES NFR BLD AUTO: 7 % (ref 0–13.4)
NEUTROPHILS # BLD AUTO: 4.98 K/UL (ref 1.82–7.42)
NEUTROPHILS NFR BLD: 62.6 % (ref 44–72)
NRBC # BLD AUTO: 0 K/UL
NRBC BLD-RTO: 0 /100 WBC (ref 0–0.2)
PLATELET # BLD AUTO: 440 K/UL (ref 164–446)
PMV BLD AUTO: 10.7 FL (ref 9–12.9)
POTASSIUM SERPL-SCNC: 4.5 MMOL/L (ref 3.6–5.5)
PROT SERPL-MCNC: 7.8 G/DL (ref 6–8.2)
RBC # BLD AUTO: 4.94 M/UL (ref 4.2–5.4)
SODIUM SERPL-SCNC: 135 MMOL/L (ref 135–145)
T PALLIDUM AB SER QL IA: NORMAL
WBC # BLD AUTO: 8 K/UL (ref 4.8–10.8)

## 2024-08-14 PROCEDURE — 83036 HEMOGLOBIN GLYCOSYLATED A1C: CPT

## 2024-08-14 PROCEDURE — 86706 HEP B SURFACE ANTIBODY: CPT

## 2024-08-14 PROCEDURE — 86480 TB TEST CELL IMMUN MEASURE: CPT

## 2024-08-14 PROCEDURE — 86038 ANTINUCLEAR ANTIBODIES: CPT

## 2024-08-14 PROCEDURE — 36415 COLL VENOUS BLD VENIPUNCTURE: CPT

## 2024-08-14 PROCEDURE — 80053 COMPREHEN METABOLIC PANEL: CPT

## 2024-08-14 PROCEDURE — 86780 TREPONEMA PALLIDUM: CPT

## 2024-08-14 PROCEDURE — 86704 HEP B CORE ANTIBODY TOTAL: CPT

## 2024-08-14 PROCEDURE — 87389 HIV-1 AG W/HIV-1&-2 AB AG IA: CPT

## 2024-08-14 PROCEDURE — 87340 HEPATITIS B SURFACE AG IA: CPT

## 2024-08-14 PROCEDURE — 83735 ASSAY OF MAGNESIUM: CPT

## 2024-08-14 PROCEDURE — 85025 COMPLETE CBC W/AUTO DIFF WBC: CPT

## 2024-08-14 PROCEDURE — 86039 ANTINUCLEAR ANTIBODIES (ANA): CPT

## 2024-08-14 PROCEDURE — 87522 HEPATITIS C REVRS TRNSCRPJ: CPT

## 2024-08-14 PROCEDURE — 86200 CCP ANTIBODY: CPT

## 2024-08-14 PROCEDURE — 86803 HEPATITIS C AB TEST: CPT

## 2024-08-15 LAB
GAMMA INTERFERON BACKGROUND BLD IA-ACNC: 0.03 IU/ML
M TB IFN-G BLD-IMP: NEGATIVE
M TB IFN-G CD4+ BCKGRND COR BLD-ACNC: 0 IU/ML
MITOGEN IGNF BCKGRD COR BLD-ACNC: >10 IU/ML
QFT TB2 - NIL TBQ2: 0 IU/ML

## 2024-08-19 LAB
ANA PAT SER IF-IMP: NORMAL
CCP IGA+IGG SERPL IA-ACNC: 4 UNITS (ref 0–19)
HCV RNA SERPL NAA+PROBE-ACNC: NOT DETECTED IU/ML
HCV RNA SERPL NAA+PROBE-LOG IU: NOT DETECTED LOG IU/ML
HCV RNA SERPL QL NAA+PROBE: NOT DETECTED
NUCLEAR IGG SER QL IA: DETECTED
NUCLEAR IGG SER QL IF: NORMAL

## 2024-08-23 DIAGNOSIS — K50.919 CROHN'S DISEASE WITH COMPLICATION, UNSPECIFIED GASTROINTESTINAL TRACT LOCATION (HCC): ICD-10-CM

## 2024-08-23 PROBLEM — K50.90 CROHN'S DISEASE (HCC): Status: ACTIVE | Noted: 2024-08-23

## 2024-09-23 ENCOUNTER — APPOINTMENT (OUTPATIENT)
Dept: ONCOLOGY | Facility: MEDICAL CENTER | Age: 24
End: 2024-09-23
Attending: NURSE PRACTITIONER
Payer: MEDICAID

## 2024-09-25 ENCOUNTER — TELEMEDICINE (OUTPATIENT)
Dept: INTERNAL MEDICINE | Facility: OTHER | Age: 24
End: 2024-09-25
Payer: MEDICAID

## 2024-09-25 DIAGNOSIS — N92.6 IRREGULAR MENSES: ICD-10-CM

## 2024-09-25 DIAGNOSIS — F60.3 BORDERLINE PERSONALITY DISORDER (HCC): ICD-10-CM

## 2024-09-25 DIAGNOSIS — K50.80 CROHN'S DISEASE OF BOTH SMALL AND LARGE INTESTINE WITHOUT COMPLICATION (HCC): ICD-10-CM

## 2024-09-25 DIAGNOSIS — F41.1 GENERALIZED ANXIETY DISORDER: ICD-10-CM

## 2024-09-25 DIAGNOSIS — E11.9 DIABETES MELLITUS TYPE 2, DIET-CONTROLLED (HCC): ICD-10-CM

## 2024-09-25 DIAGNOSIS — F33.1 MODERATE EPISODE OF RECURRENT MAJOR DEPRESSIVE DISORDER (HCC): ICD-10-CM

## 2024-09-25 PROCEDURE — 99214 OFFICE O/P EST MOD 30 MIN: CPT | Mod: GC

## 2024-09-25 RX ORDER — ACETAMINOPHEN AND CODEINE PHOSPHATE 120; 12 MG/5ML; MG/5ML
SOLUTION ORAL
Qty: 84 TABLET | Refills: 1 | Status: SHIPPED | OUTPATIENT
Start: 2024-09-25

## 2024-09-25 RX ORDER — DOXEPIN HYDROCHLORIDE 25 MG/1
25 CAPSULE ORAL NIGHTLY
Qty: 60 CAPSULE | Refills: 1 | Status: SHIPPED | OUTPATIENT
Start: 2024-09-25

## 2024-09-25 ASSESSMENT — ENCOUNTER SYMPTOMS
NAUSEA: 0
HEARTBURN: 0
COUGH: 0
DIARRHEA: 0
VOMITING: 0
ORTHOPNEA: 0
CHILLS: 0
PALPITATIONS: 0
WEIGHT LOSS: 0
ABDOMINAL PAIN: 0
SPUTUM PRODUCTION: 0
SHORTNESS OF BREATH: 0
FEVER: 0
CLAUDICATION: 0
CONSTIPATION: 0

## 2024-09-25 NOTE — PROGRESS NOTES
Virtual Visit: Established Patient   This visit was conducted via Teams using secure and encrypted videoconferencing technology.   The patient was in their home in the [facility].    The patient's identity was confirmed and verbal consent was obtained for this virtual visit.    Subjective:   CC:   Chief Complaint   Patient presents with    Pain     Tooth pain      Katie Graham is a 24 y.o. 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 The pain was described as extremely intense, initially at a level of 12 out of 10, now currently around 5-6 out of 10. The patient has tried various methods to alleviate the pain, including ice, heat, Orgel, clove oil, salt water, hydrogen peroxide, and over-the-counter pain medications such as Tylenol and Acetaminophen (alternative doses of 250 mg of Tylenol and 200 mg Ibuprofen every 8 hours), without significant relief. The patient has been started on amoxicillin, 500 mg, twice a day for seven days, and has taken three doses so far with mild improvement. There are no visible changes in the appearance of the tooth, no recent dental procedures, and no recent infections or surgeries. The earliest available dental appointment is in March 2025 so patient would like recommendations in regards to management     Patient also states mental state is worsening recently and can feel themselves spiraling to the point of needing medication for this.  Patient has previously been on numerous antipsychotics as well as SSRIs however felt that doxepin worked best for their symptoms, so would like to restart on that.     Patient also  experiencing irregular menses and would like to better regulate it and have more regular menses.  When we discussed the options she said that she would prefer a progesterone-based option and said she would like to take 3 months worth of medication and then have a period after that and then repeat. Is not currently sexually active.    ROS   Review of Systems   Constitutional:  Negative for chills, fever, malaise/fatigue and weight loss.   Respiratory:  Negative for cough, sputum production and shortness of breath.    Cardiovascular:  Negative for chest pain, palpitations, orthopnea and claudication.   Gastrointestinal:  Negative for abdominal pain, constipation, diarrhea, heartburn, nausea and vomiting.   Genitourinary:  Negative for dysuria, frequency and urgency.   Skin:  Negative for itching and rash.        Current medicines (including changes today)  Current Outpatient Medications   Medication Sig Dispense Refill    metFORMIN ER (GLUCOPHAGE XR) 500 MG TABLET SR 24 HR Take 1 Tablet by mouth every day for 7 days, THEN 2 Tablets every day for 83 days. 173 Tablet 0    pantoprazole (PROTONIX) 40 MG Tablet Delayed Response take 1 tablet by mouth twice a day 30 minutes  before breakfast meal and dinner meal 180 Tablet 3    promethazine (PHENERGAN) 25 MG Tab take 1 tablet by mouth three times a day as needed 90 Tablet 3    predniSONE (DELTASONE) 10 MG Tab take 4 tablets by mouth daily x2 weeks then 3 daily x2 weeks then 2 daily x2 weeks then 1 daily x2 weeks then 1/2 daily x2 weeks then stop 150 Tablet 3    albuterol 108 (90 Base) MCG/ACT Aero Soln inhalation aerosol Inhale 2 Puffs every 6 hours as needed for Shortness of Breath. 8.5 g 1    ondansetron (ZOFRAN ODT) 4 MG TABLET DISPERSIBLE Dissolve 1 Tablet by mouth every 8 hours as needed for Nausea/Vomiting. 30 Tablet 0    EPINEPHrine (EPIPEN) 0.3 MG/0.3ML Solution Auto-injector solution for injection Inject 0.3 mL into the thigh one time for 1 dose 1 Each 1    ferrous  gluconate (FERGON) 324 (38 Fe) MG Tab Take 1 Tablet by mouth every Monday, Wednesday, and Friday for 168 days. 36 Tablet 1    omeprazole (PRILOSEC) 20 MG delayed-release capsule Take 1 Capsule by mouth every day. 30 Capsule 2    predniSONE (DELTASONE) 10 MG Tab Take 4 Tablets by mouth every day. Take 40 mg (4 tablets) daily for 7 days, then 30 mg (3 tablets) daily for 7 days, then 20 mg (2 tablets) daily for 7 days, then 10 mg (1 tablet) daily for 7 days, then 5 mg (half tablet) daily for 7 days then stop. 30 Tablet 3     No current facility-administered medications for this visit.       Patient Active Problem List    Diagnosis Date Noted    Crohn's disease ([facility]) 08/23/2024    Iron deficiency anemia 06/07/2024    Housing situation unstable 04/24/2024    Chronic nausea 04/24/2024    Avoidant-restrictive food intake disorder (ARFID) 04/24/2024    Metabolic acidosis 04/24/2024    Sensorineural hearing loss (SNHL) of both ears 04/24/2024    Tobacco dependence 10/24/2023    History of migraine 10/04/2023    Family history of breast cancer 10/04/2023    Type 2 diabetes mellitus without complication, without long-term current use of insulin ([facility]) 10/04/2023    Crohn's disease of both small and large intestine without complication ([facility]) 09/27/2023    Sleep apnea 12/08/2021    Irregular menses 11/05/2021    Borderline personality disorder ([facility]) 09/25/2021    Suicidal behavior with attempted self-injury ([facility]) 09/24/2021    Gastroesophageal reflux disease without esophagitis 11/04/2020    Chronic pain of left ankle 11/04/2020    Major depressive disorder with psychotic features ([facility]) 11/04/2020    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    Physical Exam:  Constitutional: Alert, no distress, well-groomed.  Skin: No rashes in visible areas.  Eye: Round.  "Conjunctiva clear, lids normal. No icterus.   ENMT: Lips pink without lesions, good dentition, no visible dental abnormality moist mucous membranes. Phonation normal.  Neck: No masses, no thyromegaly. Moves freely without pain.  Respiratory: Unlabored respiratory effort, no cough or audible wheeze  Psych: Alert and oriented x3, normal affect and mood.     Assessment and Plan:   The following treatment plan was discussed:     #Tooth Ache   Patient having a significant toothache, pain is resolving slowly however not fully back to baseline as a 5 out of 10 now as opposed to a \"12 out of 10\" at the start.  Discussed with patient that it might take a little bit longer for effectiveness to reach peak and for symptoms to resolve.  Also explained that given her dental appointment is in March might need to return to clinic if tooth pain worsens after antibiotics are completed    -Continue taking Amoxicillin, finish course   -Instructed the patient that they could go up on ibuprofen as well as Tylenol dosing to help curb this pain  -Instructed patient to continue with the rinses heat ice and any other home interventions that helped    #Irregular menses  Patient would like more regular menses, discussed all the options and would prefer progesterone only solution that gives her menses every 3 months.     -Started norethindrone 0.35 mg daily for 12 weeks then stop taking for 1 week (repeating)    #Diabetes mellitus type 2, diet-controlled (HCC)  -Continue metformin  - Encouraged patient's dietary control    #Crohn's disease of both small and large intestine without complication (HCC)  Is following with GI now, they increased her prednisone as well as started her on Entyvio infusions.     -Continue to follow with GI and follow recommendations    #Moderate episode of recurrent major depressive disorder (HCC)  #Generalized anxiety disorder  Patient has previously tried SSRIs as well as antipsychotics for the symptoms and feels that " doxepin works the best so would like to restart on that since she is not yet established with psychiatry.     -Doxepin 25 mg nightly  -Establish with psychiatry    #Borderline personality disorder (HCC)  -establish with psychiatry       Follow-up: No follow-ups on file.

## 2024-10-07 ENCOUNTER — OUTPATIENT INFUSION SERVICES (OUTPATIENT)
Dept: ONCOLOGY | Facility: MEDICAL CENTER | Age: 24
End: 2024-10-07
Attending: NURSE PRACTITIONER
Payer: MEDICAID

## 2024-10-07 VITALS
HEART RATE: 105 BPM | DIASTOLIC BLOOD PRESSURE: 78 MMHG | OXYGEN SATURATION: 98 % | TEMPERATURE: 97 F | WEIGHT: 293 LBS | HEIGHT: 65 IN | BODY MASS INDEX: 48.82 KG/M2 | SYSTOLIC BLOOD PRESSURE: 107 MMHG | RESPIRATION RATE: 18 BRPM

## 2024-10-07 DIAGNOSIS — K50.919 CROHN'S DISEASE WITH COMPLICATION, UNSPECIFIED GASTROINTESTINAL TRACT LOCATION (HCC): ICD-10-CM

## 2024-10-07 PROCEDURE — 700111 HCHG RX REV CODE 636 W/ 250 OVERRIDE (IP): Mod: JZ,UD | Performed by: NURSE PRACTITIONER

## 2024-10-07 PROCEDURE — 700105 HCHG RX REV CODE 258: Mod: UD | Performed by: NURSE PRACTITIONER

## 2024-10-07 PROCEDURE — 96365 THER/PROPH/DIAG IV INF INIT: CPT

## 2024-10-07 RX ADMIN — VEDOLIZUMAB 300 MG: 300 INJECTION, POWDER, LYOPHILIZED, FOR SOLUTION INTRAVENOUS at 15:31

## 2024-10-07 ASSESSMENT — FIBROSIS 4 INDEX: FIB4 SCORE: 0.19

## 2024-10-07 ASSESSMENT — PAIN DESCRIPTION - PAIN TYPE: TYPE: CHRONIC PAIN

## 2024-10-10 ENCOUNTER — HOSPITAL ENCOUNTER (OUTPATIENT)
Dept: RADIOLOGY | Facility: MEDICAL CENTER | Age: 24
End: 2024-10-10
Attending: NURSE PRACTITIONER
Payer: MEDICAID

## 2024-10-10 ENCOUNTER — HOSPITAL ENCOUNTER (OUTPATIENT)
Dept: RADIOLOGY | Facility: MEDICAL CENTER | Age: 24
End: 2024-10-10
Attending: ORTHOPAEDIC SURGERY
Payer: MEDICAID

## 2024-10-10 DIAGNOSIS — M25.572 CHRONIC PAIN OF LEFT ANKLE: ICD-10-CM

## 2024-10-10 DIAGNOSIS — K21.9 GASTRIC REFLUX SYNDROME: ICD-10-CM

## 2024-10-10 DIAGNOSIS — M07.60 ARTHROPATHY IN CROHN'S DISEASE (HCC): ICD-10-CM

## 2024-10-10 DIAGNOSIS — G89.29 CHRONIC PAIN OF LEFT ANKLE: ICD-10-CM

## 2024-10-10 DIAGNOSIS — K50.90 ARTHROPATHY IN CROHN'S DISEASE (HCC): ICD-10-CM

## 2024-10-10 DIAGNOSIS — M19.072 ARTHRITIS OF LEFT SUBTALAR JOINT: ICD-10-CM

## 2024-10-10 DIAGNOSIS — R11.2 MODERATE NAUSEA AND VOMITING: ICD-10-CM

## 2024-10-10 PROCEDURE — 700101 HCHG RX REV CODE 250: Mod: UD | Performed by: ORTHOPAEDIC SURGERY

## 2024-10-10 PROCEDURE — 74177 CT ABD & PELVIS W/CONTRAST: CPT

## 2024-10-10 PROCEDURE — 700111 HCHG RX REV CODE 636 W/ 250 OVERRIDE (IP): Mod: UD | Performed by: ORTHOPAEDIC SURGERY

## 2024-10-10 PROCEDURE — 20605 DRAIN/INJ JOINT/BURSA W/O US: CPT | Mod: LT

## 2024-10-10 PROCEDURE — 77002 NEEDLE LOCALIZATION BY XRAY: CPT

## 2024-10-10 PROCEDURE — 700117 HCHG RX CONTRAST REV CODE 255: Mod: UD | Performed by: ORTHOPAEDIC SURGERY

## 2024-10-10 PROCEDURE — 700117 HCHG RX CONTRAST REV CODE 255: Mod: UD | Performed by: NURSE PRACTITIONER

## 2024-10-10 RX ORDER — BUPIVACAINE HYDROCHLORIDE 5 MG/ML
20 INJECTION, SOLUTION EPIDURAL; INTRACAUDAL ONCE
Status: COMPLETED | OUTPATIENT
Start: 2024-10-10 | End: 2024-10-10

## 2024-10-10 RX ORDER — TRIAMCINOLONE ACETONIDE 40 MG/ML
40 INJECTION, SUSPENSION INTRA-ARTICULAR; INTRAMUSCULAR ONCE
Status: COMPLETED | OUTPATIENT
Start: 2024-10-10 | End: 2024-10-10

## 2024-10-10 RX ADMIN — LIDOCAINE HYDROCHLORIDE 1 ML: 10 INJECTION, SOLUTION INFILTRATION; PERINEURAL at 14:35

## 2024-10-10 RX ADMIN — TRIAMCINOLONE ACETONIDE 40 MG: 40 INJECTION, SUSPENSION INTRA-ARTICULAR; INTRAMUSCULAR at 14:35

## 2024-10-10 RX ADMIN — BUPIVACAINE HYDROCHLORIDE 1 ML: 5 INJECTION, SOLUTION EPIDURAL; INTRACAUDAL at 14:34

## 2024-10-10 RX ADMIN — IOHEXOL 100 ML: 350 INJECTION, SOLUTION INTRAVENOUS at 13:36

## 2024-10-10 RX ADMIN — IOHEXOL 10 ML: 300 INJECTION, SOLUTION INTRAVENOUS at 14:34

## 2024-10-10 RX ADMIN — IOHEXOL 25 ML: 240 INJECTION, SOLUTION INTRATHECAL; INTRAVASCULAR; INTRAVENOUS; ORAL at 13:36

## 2024-10-15 ENCOUNTER — OFFICE VISIT (OUTPATIENT)
Dept: INTERNAL MEDICINE | Facility: OTHER | Age: 24
End: 2024-10-15
Payer: MEDICAID

## 2024-10-15 VITALS
HEIGHT: 62 IN | WEIGHT: 293 LBS | BODY MASS INDEX: 53.92 KG/M2 | DIASTOLIC BLOOD PRESSURE: 85 MMHG | SYSTOLIC BLOOD PRESSURE: 158 MMHG | TEMPERATURE: 99.6 F | OXYGEN SATURATION: 97 % | HEART RATE: 103 BPM

## 2024-10-15 DIAGNOSIS — K76.0 HEPATIC STEATOSIS: ICD-10-CM

## 2024-10-15 DIAGNOSIS — K80.20 CALCULUS OF GALLBLADDER WITHOUT CHOLECYSTITIS WITHOUT OBSTRUCTION: ICD-10-CM

## 2024-10-15 DIAGNOSIS — R16.1 SPLENOMEGALY: ICD-10-CM

## 2024-10-15 DIAGNOSIS — K50.80 CROHN'S DISEASE OF BOTH SMALL AND LARGE INTESTINE WITHOUT COMPLICATION (HCC): ICD-10-CM

## 2024-10-15 PROCEDURE — 99214 OFFICE O/P EST MOD 30 MIN: CPT | Mod: GC

## 2024-10-15 ASSESSMENT — ENCOUNTER SYMPTOMS
DIARRHEA: 0
VOMITING: 1
SHORTNESS OF BREATH: 0
DEPRESSION: 1
FEVER: 0
NERVOUS/ANXIOUS: 0
SORE THROAT: 0
PALPITATIONS: 1
SINUS PAIN: 0
MYALGIAS: 1
BACK PAIN: 0
CHILLS: 0
DIZZINESS: 1
BLURRED VISION: 0
SEIZURES: 0
NAUSEA: 1
COUGH: 0
ABDOMINAL PAIN: 1
CLAUDICATION: 0
HEADACHES: 0
HEARTBURN: 0
SPUTUM PRODUCTION: 0
LOSS OF CONSCIOUSNESS: 0

## 2024-10-15 ASSESSMENT — FIBROSIS 4 INDEX: FIB4 SCORE: 0.19

## 2024-10-21 ENCOUNTER — OUTPATIENT INFUSION SERVICES (OUTPATIENT)
Dept: ONCOLOGY | Facility: MEDICAL CENTER | Age: 24
End: 2024-10-21
Attending: NURSE PRACTITIONER
Payer: MEDICAID

## 2024-10-21 VITALS
WEIGHT: 293 LBS | HEIGHT: 65 IN | BODY MASS INDEX: 48.82 KG/M2 | OXYGEN SATURATION: 96 % | SYSTOLIC BLOOD PRESSURE: 128 MMHG | DIASTOLIC BLOOD PRESSURE: 91 MMHG | HEART RATE: 98 BPM | TEMPERATURE: 99 F | RESPIRATION RATE: 18 BRPM

## 2024-10-21 DIAGNOSIS — K50.919 CROHN'S DISEASE WITH COMPLICATION, UNSPECIFIED GASTROINTESTINAL TRACT LOCATION (HCC): ICD-10-CM

## 2024-10-21 PROCEDURE — 700105 HCHG RX REV CODE 258: Mod: UD | Performed by: NURSE PRACTITIONER

## 2024-10-21 PROCEDURE — 700111 HCHG RX REV CODE 636 W/ 250 OVERRIDE (IP): Mod: JZ,UD | Performed by: NURSE PRACTITIONER

## 2024-10-21 PROCEDURE — 96365 THER/PROPH/DIAG IV INF INIT: CPT

## 2024-10-21 RX ADMIN — VEDOLIZUMAB 300 MG: 300 INJECTION, POWDER, LYOPHILIZED, FOR SOLUTION INTRAVENOUS at 16:15

## 2024-10-21 ASSESSMENT — FIBROSIS 4 INDEX: FIB4 SCORE: 0.19

## 2024-11-18 ENCOUNTER — OFFICE VISIT (OUTPATIENT)
Dept: INTERNAL MEDICINE | Facility: OTHER | Age: 24
End: 2024-11-18
Payer: MEDICAID

## 2024-11-18 ENCOUNTER — TELEPHONE (OUTPATIENT)
Dept: ONCOLOGY | Facility: MEDICAL CENTER | Age: 24
End: 2024-11-18

## 2024-11-18 VITALS
SYSTOLIC BLOOD PRESSURE: 132 MMHG | HEIGHT: 65 IN | OXYGEN SATURATION: 97 % | DIASTOLIC BLOOD PRESSURE: 84 MMHG | HEART RATE: 95 BPM | BODY MASS INDEX: 48.82 KG/M2 | TEMPERATURE: 99.2 F | WEIGHT: 293 LBS

## 2024-11-18 DIAGNOSIS — H90.3 SENSORINEURAL HEARING LOSS (SNHL) OF BOTH EARS: ICD-10-CM

## 2024-11-18 DIAGNOSIS — R42 LIGHTHEADEDNESS: ICD-10-CM

## 2024-11-18 DIAGNOSIS — Z86.2 HISTORY OF AUTOIMMUNE DISEASE: ICD-10-CM

## 2024-11-18 DIAGNOSIS — K92.0 HEMATEMESIS WITH NAUSEA: ICD-10-CM

## 2024-11-18 DIAGNOSIS — K50.80 CROHN'S DISEASE OF BOTH SMALL AND LARGE INTESTINE WITHOUT COMPLICATION (HCC): ICD-10-CM

## 2024-11-18 DIAGNOSIS — K21.9 GASTROESOPHAGEAL REFLUX DISEASE WITHOUT ESOPHAGITIS: ICD-10-CM

## 2024-11-18 DIAGNOSIS — E61.1 IRON DEFICIENCY: ICD-10-CM

## 2024-11-18 DIAGNOSIS — F17.200 NICOTINE USE DISORDER: ICD-10-CM

## 2024-11-18 DIAGNOSIS — Z79.52 CURRENT CHRONIC USE OF SYSTEMIC STEROIDS: ICD-10-CM

## 2024-11-18 DIAGNOSIS — Q79.60 EHLERS-DANLOS DISEASE: ICD-10-CM

## 2024-11-18 DIAGNOSIS — E11.9 TYPE 2 DIABETES MELLITUS WITHOUT COMPLICATION, WITHOUT LONG-TERM CURRENT USE OF INSULIN (HCC): ICD-10-CM

## 2024-11-18 PROBLEM — Z00.00 HEALTHCARE MAINTENANCE: Status: RESOLVED | Noted: 2020-11-04 | Resolved: 2024-11-18

## 2024-11-18 PROBLEM — Z91.51 HISTORY OF SUICIDE ATTEMPT: Status: ACTIVE | Noted: 2021-09-24

## 2024-11-18 PROCEDURE — 3079F DIAST BP 80-89 MM HG: CPT | Mod: GC

## 2024-11-18 PROCEDURE — 99214 OFFICE O/P EST MOD 30 MIN: CPT | Mod: GC

## 2024-11-18 PROCEDURE — 3075F SYST BP GE 130 - 139MM HG: CPT | Mod: GC

## 2024-11-18 RX ORDER — NICOTINE 21 MG/24HR
1 PATCH, TRANSDERMAL 24 HOURS TRANSDERMAL EVERY 24 HOURS
Qty: 30 PATCH | Refills: 1 | Status: SHIPPED | OUTPATIENT
Start: 2024-11-18

## 2024-11-18 RX ORDER — VEDOLIZUMAB 108 MG/.68ML
INJECTION, SOLUTION SUBCUTANEOUS
COMMUNITY

## 2024-11-18 ASSESSMENT — FIBROSIS 4 INDEX: FIB4 SCORE: 0.19

## 2024-11-18 NOTE — PATIENT INSTRUCTIONS
For free smoking cessation materials:  - [phone])-QUIT-NOW  - [email or web address]    Please obtain labs ordered this visit and last month. If you can, return next week. I have also sent referrals to several providers which will take time to follow up on. Since we have so much to work on together, I would like to see you more frequently so we can dedicate enough time to each issue.    Please start physical therapy so they can guide you in strengthening the muscles around the joints.    Schedule appointment with rheumatology, genetics, ophthalmology, and physical therapy. Referral has been sent, once approved you will be notified via My Chart with the information for you to call and coordinate.

## 2024-11-18 NOTE — PROGRESS NOTES
Established Patient    Patient Care Team:  Leslie Aguero M.D. as PCP - General (Internal Medicine)    Today's visit and plan was discussed with attending physician, Dr. Abreu.    Chief Complaint   Patient presents with    Referral Needed     - shoulders will dislocate often - requesting a referral to pain management   - trying to stop smoking nicotine vapes        HPI:  Katie Graham is a 24 y.o. person with relevant medical problems of Chron's disease, T2DM, Obesity, Mood disorders, tobacco dependence, unstable housing who presents today for follow-up.    Despite starting monoclonal therapy for Crohn's disease, she continues to have daily emesis (coffee ground once a week) and daily diarrhea often with bright red blood mixed in.  She has to restrict her diet significantly to tolerate any food.    She also was requesting a referral to pain management.  On further details, she describes daily pain secondary to her joint hypermobility.  She often wakes up with a dislocated knee or elbow.  She finds that she does not get much sleep because of her pain.  She has not tried physical therapy.  She has several braces all of which do not fully help with the laxity.  She her mom was diagnosed with Nancy-Danlos syndrome and her sister also had the same issues although to a lesser degree.  She subluxes or dislocates several joints every day.  She has never had genetic testing as it was not previously covered by insurance.  Thus far, autoimmune workup is negative for associated diseases.  She avoids most physical activity as she is fearful that it will cause joint pain.  Other MSK issues include history of foot and ankle deformity for which she is seeing surgeon tomorrow.    We also discussed her concerns regarding lightheadedness and dizziness.  She thinks that she has been evaluated before for POTS.  She does not recall ever having Epley or Raheem-Hallpike maneuvers.  She states that when she wakes up she feels  like she is on a seesaw and when she stands up quickly she gets lightheaded and sees stars in her eyes.  She denies sensation of the room spinning.  She is to faint frequently, however, this is stopped since she started vaping tobacco and she believes this is because it raises her blood pressure enough to prevent fainting. She has had low iron and anemia in the past, labs ordered for repeat.    Would also like to discuss tobacco cessation. Concerned that stopping will lower blood pressure and cause her to faint again.     Preventative health measures:  Colon cancer screen: followed by GI for Crohn's  Cervical cancer screen: overdue, will schedule  Flu shot: declined this visit  Meningitis B protection: would benefit given housing situation, will readdress at future appointment  Hep A&B vaccine: overdue, declined but will discuss at future appointment  Eye exam: referral sent  Dental exam: overdue  Diet: limited due to Chron's, currently trying [employer]  Exercise: limited due to joint issues    ROS:   General: No fevers, chills, night sweats. Reports weight loss due to low appetite.  HENT: hearing loss (chronic), no changes in vision. No congstion.  Pulmonary: No shortness of breath, cough, sputum, or hemoptysis.  Cardiovascular: No chest pain, palpitations, or LE swelling.   GI: N/V/D/abdo pain all present. Diarrhea often bright red blood. Daily emesis occasionally with blood.  : No dysuria, frequency, retention or hematuria.   MSK: Frequent dislocation/subluxation   Neuro: Lightheaded and dizziness daily.   Psych: depression.     Past Medical History:   Diagnosis Date    Allergy     Apples cause migrains and fainting    Anxiety     Borderline personality disorder (HCC)     Depression     Family history of diabetes mellitus 11/04/2020    GERD (gastroesophageal reflux disease)     Head ache     History of meka     Mast cell activation syndrome (HCC)     Migraine     POTS (postural orthostatic tachycardia syndrome)      Substance abuse ([facility])     xanax and alcohol in highschool    Suicidal ideation 09/24/2021    Type 2 diabetes, diet controlled ([facility]) 10/04/2023    History of A1c's up to 8.3  Patient checks home BG twice daily  Controls with diet  Has not had A1c in over a year       UGIB (upper gastrointestinal bleed) 10/23/2023       Social History     Tobacco Use    Smoking status: Former     Current packs/day: 0.50     Average packs/day: 0.4 packs/day for 1.3 years (0.6 ttl pk-yrs)     Types: Cigarettes     Start date: 8/14/2018     Quit date: 11/4/2018    Smokeless tobacco: Never    Tobacco comments:     Quit after 3 months   Vaping Use    Vaping status: Every Day    Substances: Nicotine, 0 anali     Devices: Disposable   Substance Use Topics    Alcohol use: Not Currently     Comment: occasionally    Drug use: Not Currently     Types: Marijuana, Inhaled     Comment: past hx of using multiple drugs in high school       Current Outpatient Medications   Medication Sig Dispense Refill    Vedolizumab (ENTYVIO) 108 MG/0.68ML Solution Auto-injector Inject  under the skin every 8 weeks.      nicotine (NICODERM) 14 MG/24HR PATCH 24 HR Place 1 Patch on the skin every 24 hours. 30 Patch 1    doxepin (SINEQUAN) 25 MG Cap Take 1 Capsule by mouth every evening. 60 Capsule 1    norethindrone (MICRONOR) 0.35 MG tablet Please take one pill every day (within the same 3 hour time frame) for 12 weeks total and then the next week do not take this medication (this will induce period) 84 Tablet 1    metFORMIN ER (GLUCOPHAGE XR) 500 MG TABLET SR 24 HR Take 1 Tablet by mouth every day for 7 days, THEN 2 Tablets every day for 83 days. 173 Tablet 0    pantoprazole (PROTONIX) 40 MG Tablet Delayed Response take 1 tablet by mouth twice a day 30 minutes  before breakfast meal and dinner meal 180 Tablet 3    promethazine (PHENERGAN) 25 MG Tab take 1 tablet by mouth three times a day as needed 90 Tablet 3    albuterol 108 (90 Base) MCG/ACT Aero Soln inhalation  "aerosol Inhale 2 Puffs every 6 hours as needed for Shortness of Breath. 8.5 g 1    ondansetron (ZOFRAN ODT) 4 MG TABLET DISPERSIBLE Dissolve 1 Tablet by mouth every 8 hours as needed for Nausea/Vomiting. 30 Tablet 0    EPINEPHrine (EPIPEN) 0.3 MG/0.3ML Solution Auto-injector solution for injection Inject 0.3 mL into the thigh one time for 1 dose 1 Each 1    omeprazole (PRILOSEC) 20 MG delayed-release capsule Take 1 Capsule by mouth every day. 30 Capsule 2     No current facility-administered medications for this visit.       /84 (BP Location: Left arm, Patient Position: Sitting, BP Cuff Size: Large adult)   Pulse 95   Temp 37.3 °C (99.2 °F) (Temporal)   Ht 1.651 m (5' 5\") Comment: pt reported  Wt (!) 135 kg (298 lb)   SpO2 97%   BMI 49.59 kg/m²     PHYSICAL EXAM:   General: No acute distress, good interaction. Obese habitus.  Head and Neck: NC/AT, EOMI, no scleral icterus or conjunctival pallor.  CV: Rhythmic heart sounds, no murmurs, gallops or rubs.  Pulm: Chest expansion is symmetrical, no crackles, rales, rhonchi, or wheezing.  GI: Soft, nontender abdomen.  Skin: Several bruises along legs. Small and in various degrees of healing. Normal skin extensibility  MSK: Increased range of motion, hyperextension in elbow and knees.  Neuro: Patient is alert and oriented x3. Speech is clear and fluent, goal directed. Pupils equal, round and reactive to light. Good eye contact. Extra ocular movements intact. Facial and body symmetry without obvious focal deficits.  Psych: Appropriate mood and affect.      Assessment and Plan:   1. 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 This, in addition to significant family history, make her diagnosis even more likely. She has not had a complete workup for this nor has she utilized PT to strengthen the muscles around her joints. Additionally, she will need further cardiac and ophthalmologic workup to rule out the common/more severe " complications of the disease. She does have several family members with structural heart disease. No murmurs on exam today.  - Referral to Genetics  - Referral to Physical Therapy  - EC-ECHOCARDIOGRAM COMPLETE W/O CONT; Future  - Referral to Ophthalmology  - Referral to Rheumatology    2. Current chronic use of systemic steroids  Due to her Crohn's and has been refractory to maintenance medications, she has had several rounds of systemic steroids.  Also at risk due to Crohn's itself for malabsorption.  She recalls several years ago being told that her bone density is too low for cochlear implants.  Will screen for deficiencies and bone density issues.  - VITAMIN D,25 HYDROXY (DEFICIENCY); Future  - DS-BONE DENSITY STUDY (DEXA); Future    3. Type 2 diabetes mellitus without complication, without long-term current use of insulin (HCC)  Last A1c was 6.8% in 8/2024.  Due to presence of other autoimmune disorders, will screen for autoimmune diabetes. Monofilament test to be done at next appointment. She will also receive retinal screening with ophthalmology.  - HEMOGLOBIN A1C; Future  - GAD65 AND INSULIN AB W/RFLX TO IA-2 AB; Future  - ISLET CELL AB, IGG; Future  - IA-2 AUTOANTIBODIES  - Zinc Transporter 8 Antibody; Future    4. Lightheadedness  5. Iron deficiency  Orthostatics in office were negative.  Denies room spinning.  Lightheadedness present with positional change and first thing in the morning.  History of iron deficiency.  - IRON/TOTAL IRON BIND; Future  - FERRITIN; Future  - Orthostatic Blood Pressure    6. Nicotine use disorder  She is interested in quitting nicotine.  Vapes daily.  Cannot afford nicotine patches.  If not covered by insurance, provided information on how to receive free cessation medications.  - nicotine (NICODERM) 14 MG/24HR PATCH 24 HR; Place 1 Patch on the skin every 24 hours.  Dispense: 30 Patch; Refill: 1    7. History of autoimmune disease  See above for details.  Overall, unusual  constellation of symptoms and disorders especially at such a young age.  Would like to consider if there is one unifying diagnosis.  - GAD65 AND INSULIN AB W/RFLX TO IA-2 AB; Future  - ISLET CELL AB, IGG; Future  - IA-2 AUTOANTIBODIES  - Referral to Rheumatology  - Zinc Transporter 8 Antibody; Future    8. Crohn's disease of both small and large intestine without complication (HCC)  9. Gastroesophageal reflux disease without esophagitis  10. Hematemesis with nausea  Followed by GI.  She does not report any changes in the frequency of her bloody diarrhea or daily emesis.  She just completed the loading doses for monoclonal therapy.  - Continue vendolizumab, follow-up with GI  - IRON/TOTAL IRON BIND; Future  - FERRITIN; Future    11. Sensorineural hearing loss (SNHL) of both ears  Patient states that she is being fitted for hearing aids for bilateral hearing loss.  She thinks that this has been an issue since her childhood and she recalls having recurrent ear infections.  - Follow-up with audiology.       Return in about 9 days (around 11/27/2024).  Items in need of addressing at future appointments: Care gaps (cervical cancer screen, vaccines, monofilament test)    Leslie Aguero M.D., PGY-2 Internal Medicine  [facility]    This note was created using voice recognition software.  While every attempt is made to ensure accuracy of transcription, occasionally errors occur.

## 2024-11-19 PROBLEM — M25.372 ANKLE INSTABILITY, LEFT: Status: ACTIVE | Noted: 2024-11-19

## 2024-11-19 NOTE — TELEPHONE ENCOUNTER
Called regarding missed 1500 appt for Entyvio, no answer. VM left with scheduling phone number to reschedule.

## 2024-12-16 ENCOUNTER — APPOINTMENT (OUTPATIENT)
Dept: ADMISSIONS | Facility: MEDICAL CENTER | Age: 24
End: 2024-12-16
Attending: ORTHOPAEDIC SURGERY
Payer: MEDICAID

## 2024-12-23 ENCOUNTER — PRE-ADMISSION TESTING (OUTPATIENT)
Dept: ADMISSIONS | Facility: MEDICAL CENTER | Age: 24
End: 2024-12-23
Attending: ORTHOPAEDIC SURGERY
Payer: MEDICAID

## 2024-12-23 NOTE — OR NURSING
Pre-admit phone appt completed with patient. She states she will be non-wt bearing for 6-8 weeks post op. She states she already has crutches, wheelchair, and has ordered a scooter. She declines need to speak with nurse navigator for any additional equipment.

## 2024-12-31 ENCOUNTER — APPOINTMENT (OUTPATIENT)
Dept: INTERNAL MEDICINE | Facility: OTHER | Age: 24
End: 2024-12-31
Payer: MEDICAID

## 2025-01-07 ENCOUNTER — ANESTHESIA EVENT (OUTPATIENT)
Dept: SURGERY | Facility: MEDICAL CENTER | Age: 25
End: 2025-01-07
Payer: MEDICAID

## 2025-01-08 ENCOUNTER — ANESTHESIA (OUTPATIENT)
Dept: SURGERY | Facility: MEDICAL CENTER | Age: 25
End: 2025-01-08
Payer: MEDICAID

## 2025-01-08 ENCOUNTER — APPOINTMENT (OUTPATIENT)
Dept: RADIOLOGY | Facility: MEDICAL CENTER | Age: 25
End: 2025-01-08
Attending: ORTHOPAEDIC SURGERY
Payer: MEDICAID

## 2025-01-08 ENCOUNTER — HOSPITAL ENCOUNTER (OUTPATIENT)
Facility: MEDICAL CENTER | Age: 25
End: 2025-01-08
Attending: ORTHOPAEDIC SURGERY | Admitting: ORTHOPAEDIC SURGERY
Payer: MEDICAID

## 2025-01-08 VITALS
HEART RATE: 95 BPM | BODY MASS INDEX: 48.82 KG/M2 | SYSTOLIC BLOOD PRESSURE: 122 MMHG | OXYGEN SATURATION: 93 % | WEIGHT: 293 LBS | TEMPERATURE: 97.1 F | RESPIRATION RATE: 24 BRPM | HEIGHT: 65 IN | DIASTOLIC BLOOD PRESSURE: 91 MMHG

## 2025-01-08 LAB
GLUCOSE BLD STRIP.AUTO-MCNC: 235 MG/DL (ref 65–99)
HCG UR QL: NEGATIVE

## 2025-01-08 PROCEDURE — 700102 HCHG RX REV CODE 250 W/ 637 OVERRIDE(OP): Mod: UD | Performed by: STUDENT IN AN ORGANIZED HEALTH CARE EDUCATION/TRAINING PROGRAM

## 2025-01-08 PROCEDURE — 700101 HCHG RX REV CODE 250: Mod: UD | Performed by: STUDENT IN AN ORGANIZED HEALTH CARE EDUCATION/TRAINING PROGRAM

## 2025-01-08 PROCEDURE — 160046 HCHG PACU - 1ST 60 MINS PHASE II: Performed by: ORTHOPAEDIC SURGERY

## 2025-01-08 PROCEDURE — 28305 INCISE/GRAFT MIDFOOT BONES: CPT | Mod: LT | Performed by: ORTHOPAEDIC SURGERY

## 2025-01-08 PROCEDURE — C1713 ANCHOR/SCREW BN/BN,TIS/BN: HCPCS | Performed by: ORTHOPAEDIC SURGERY

## 2025-01-08 PROCEDURE — 502240 HCHG MISC OR SUPPLY RC 0272: Performed by: ORTHOPAEDIC SURGERY

## 2025-01-08 PROCEDURE — 27698 REPAIR OF ANKLE LIGAMENT: CPT | Mod: LT | Performed by: ORTHOPAEDIC SURGERY

## 2025-01-08 PROCEDURE — E0114 CRUTCH UNDERARM PAIR NO WOOD: HCPCS | Performed by: ORTHOPAEDIC SURGERY

## 2025-01-08 PROCEDURE — 28725 ARTHRODESIS SUBTALAR: CPT | Mod: LT | Performed by: ORTHOPAEDIC SURGERY

## 2025-01-08 PROCEDURE — 27698 REPAIR OF ANKLE LIGAMENT: CPT | Mod: 80ROC,LT | Performed by: FAMILY MEDICINE

## 2025-01-08 PROCEDURE — 28260 RELEASE OF MIDFOOT JOINT: CPT | Mod: 80ROC,LT | Performed by: FAMILY MEDICINE

## 2025-01-08 PROCEDURE — 28899 UNLISTED PX FOOT/TOES: CPT | Mod: 80ROC | Performed by: FAMILY MEDICINE

## 2025-01-08 PROCEDURE — 160009 HCHG ANES TIME/MIN: Performed by: ORTHOPAEDIC SURGERY

## 2025-01-08 PROCEDURE — 700105 HCHG RX REV CODE 258: Mod: UD | Performed by: ORTHOPAEDIC SURGERY

## 2025-01-08 PROCEDURE — 160025 RECOVERY II MINUTES (STATS): Performed by: ORTHOPAEDIC SURGERY

## 2025-01-08 PROCEDURE — 160029 HCHG SURGERY MINUTES - 1ST 30 MINS LEVEL 4: Performed by: ORTHOPAEDIC SURGERY

## 2025-01-08 PROCEDURE — 28305 INCISE/GRAFT MIDFOOT BONES: CPT | Mod: 80ROC,LT | Performed by: FAMILY MEDICINE

## 2025-01-08 PROCEDURE — 700111 HCHG RX REV CODE 636 W/ 250 OVERRIDE (IP): Mod: UD | Performed by: ORTHOPAEDIC SURGERY

## 2025-01-08 PROCEDURE — 81025 URINE PREGNANCY TEST: CPT

## 2025-01-08 PROCEDURE — 28260 RELEASE OF MIDFOOT JOINT: CPT | Mod: LT | Performed by: ORTHOPAEDIC SURGERY

## 2025-01-08 PROCEDURE — 28899 UNLISTED PX FOOT/TOES: CPT | Performed by: ORTHOPAEDIC SURGERY

## 2025-01-08 PROCEDURE — 28725 ARTHRODESIS SUBTALAR: CPT | Mod: 80ROC,LT | Performed by: FAMILY MEDICINE

## 2025-01-08 PROCEDURE — 160002 HCHG RECOVERY MINUTES (STAT): Performed by: ORTHOPAEDIC SURGERY

## 2025-01-08 PROCEDURE — 700111 HCHG RX REV CODE 636 W/ 250 OVERRIDE (IP): Mod: JZ,UD | Performed by: STUDENT IN AN ORGANIZED HEALTH CARE EDUCATION/TRAINING PROGRAM

## 2025-01-08 PROCEDURE — 73610 X-RAY EXAM OF ANKLE: CPT | Mod: LT

## 2025-01-08 PROCEDURE — 700111 HCHG RX REV CODE 636 W/ 250 OVERRIDE (IP): Mod: UD | Performed by: STUDENT IN AN ORGANIZED HEALTH CARE EDUCATION/TRAINING PROGRAM

## 2025-01-08 PROCEDURE — 29898 ANKLE ARTHROSCOPY/SURGERY: CPT | Mod: 80ROC,LT | Performed by: FAMILY MEDICINE

## 2025-01-08 PROCEDURE — 82962 GLUCOSE BLOOD TEST: CPT

## 2025-01-08 PROCEDURE — 160048 HCHG OR STATISTICAL LEVEL 1-5: Performed by: ORTHOPAEDIC SURGERY

## 2025-01-08 PROCEDURE — 160036 HCHG PACU - EA ADDL 30 MINS PHASE I: Performed by: ORTHOPAEDIC SURGERY

## 2025-01-08 PROCEDURE — 160035 HCHG PACU - 1ST 60 MINS PHASE I: Performed by: ORTHOPAEDIC SURGERY

## 2025-01-08 PROCEDURE — 29898 ANKLE ARTHROSCOPY/SURGERY: CPT | Mod: LT | Performed by: ORTHOPAEDIC SURGERY

## 2025-01-08 PROCEDURE — 160041 HCHG SURGERY MINUTES - EA ADDL 1 MIN LEVEL 4: Performed by: ORTHOPAEDIC SURGERY

## 2025-01-08 PROCEDURE — 700101 HCHG RX REV CODE 250: Mod: UD | Performed by: ORTHOPAEDIC SURGERY

## 2025-01-08 PROCEDURE — A9270 NON-COVERED ITEM OR SERVICE: HCPCS | Mod: UD | Performed by: STUDENT IN AN ORGANIZED HEALTH CARE EDUCATION/TRAINING PROGRAM

## 2025-01-08 DEVICE — GRAFT BONE AUGMENT 1.5CC (1/EA): Type: IMPLANTABLE DEVICE | Site: ANKLE | Status: FUNCTIONAL

## 2025-01-08 DEVICE — SUTURE ANCHOR TWINFIX 3.5 WITH NEEDLES SMALL JOINT: Type: IMPLANTABLE DEVICE | Site: ANKLE | Status: FUNCTIONAL

## 2025-01-08 DEVICE — IMPLANTABLE DEVICE: Type: IMPLANTABLE DEVICE | Site: ANKLE | Status: FUNCTIONAL

## 2025-01-08 DEVICE — WASHER FOR 7.3MM CANNULATED SCREWS 13.0MM (3TX18=54) (6EA/PK): Type: IMPLANTABLE DEVICE | Site: ANKLE | Status: FUNCTIONAL

## 2025-01-08 DEVICE — SCREW CANNULATED FULLY THREADED 7.3MM X 75MM (3TX3=9): Type: IMPLANTABLE DEVICE | Site: ANKLE | Status: FUNCTIONAL

## 2025-01-08 RX ORDER — HYDROMORPHONE HYDROCHLORIDE 1 MG/ML
0.2 INJECTION, SOLUTION INTRAMUSCULAR; INTRAVENOUS; SUBCUTANEOUS
Status: DISCONTINUED | OUTPATIENT
Start: 2025-01-08 | End: 2025-01-08 | Stop reason: HOSPADM

## 2025-01-08 RX ORDER — KETOROLAC TROMETHAMINE 15 MG/ML
INJECTION, SOLUTION INTRAMUSCULAR; INTRAVENOUS PRN
Status: DISCONTINUED | OUTPATIENT
Start: 2025-01-08 | End: 2025-01-08 | Stop reason: SURG

## 2025-01-08 RX ORDER — HYDROMORPHONE HYDROCHLORIDE 2 MG/ML
INJECTION, SOLUTION INTRAMUSCULAR; INTRAVENOUS; SUBCUTANEOUS PRN
Status: DISCONTINUED | OUTPATIENT
Start: 2025-01-08 | End: 2025-01-08 | Stop reason: SURG

## 2025-01-08 RX ORDER — OXYCODONE HCL 5 MG/5 ML
5 SOLUTION, ORAL ORAL
Status: COMPLETED | OUTPATIENT
Start: 2025-01-08 | End: 2025-01-08

## 2025-01-08 RX ORDER — TRANEXAMIC ACID 100 MG/ML
INJECTION, SOLUTION INTRAVENOUS PRN
Status: DISCONTINUED | OUTPATIENT
Start: 2025-01-08 | End: 2025-01-08 | Stop reason: SURG

## 2025-01-08 RX ORDER — EPHEDRINE SULFATE 50 MG/ML
5 INJECTION, SOLUTION INTRAVENOUS
Status: DISCONTINUED | OUTPATIENT
Start: 2025-01-08 | End: 2025-01-08 | Stop reason: HOSPADM

## 2025-01-08 RX ORDER — MAGNESIUM HYDROXIDE 1200 MG/15ML
LIQUID ORAL
Status: COMPLETED | OUTPATIENT
Start: 2025-01-08 | End: 2025-01-08

## 2025-01-08 RX ORDER — DEXTROSE MONOHYDRATE 25 G/50ML
25 INJECTION, SOLUTION INTRAVENOUS
Status: DISCONTINUED | OUTPATIENT
Start: 2025-01-08 | End: 2025-01-08 | Stop reason: HOSPADM

## 2025-01-08 RX ORDER — ESMOLOL HYDROCHLORIDE 10 MG/ML
INJECTION INTRAVENOUS PRN
Status: DISCONTINUED | OUTPATIENT
Start: 2025-01-08 | End: 2025-01-08 | Stop reason: SURG

## 2025-01-08 RX ORDER — ONDANSETRON 2 MG/ML
INJECTION INTRAMUSCULAR; INTRAVENOUS PRN
Status: DISCONTINUED | OUTPATIENT
Start: 2025-01-08 | End: 2025-01-08 | Stop reason: SURG

## 2025-01-08 RX ORDER — DEXAMETHASONE SODIUM PHOSPHATE 4 MG/ML
INJECTION, SOLUTION INTRA-ARTICULAR; INTRALESIONAL; INTRAMUSCULAR; INTRAVENOUS; SOFT TISSUE PRN
Status: DISCONTINUED | OUTPATIENT
Start: 2025-01-08 | End: 2025-01-08 | Stop reason: SURG

## 2025-01-08 RX ORDER — SUCCINYLCHOLINE CHLORIDE 20 MG/ML
INJECTION INTRAMUSCULAR; INTRAVENOUS PRN
Status: DISCONTINUED | OUTPATIENT
Start: 2025-01-08 | End: 2025-01-08 | Stop reason: SURG

## 2025-01-08 RX ORDER — ONDANSETRON 2 MG/ML
4 INJECTION INTRAMUSCULAR; INTRAVENOUS
Status: DISCONTINUED | OUTPATIENT
Start: 2025-01-08 | End: 2025-01-08 | Stop reason: HOSPADM

## 2025-01-08 RX ORDER — ALBUTEROL SULFATE 5 MG/ML
2.5 SOLUTION RESPIRATORY (INHALATION)
Status: DISCONTINUED | OUTPATIENT
Start: 2025-01-08 | End: 2025-01-08 | Stop reason: HOSPADM

## 2025-01-08 RX ORDER — BUPIVACAINE HYDROCHLORIDE 5 MG/ML
INJECTION, SOLUTION EPIDURAL; INTRACAUDAL
Status: DISCONTINUED | OUTPATIENT
Start: 2025-01-08 | End: 2025-01-08 | Stop reason: HOSPADM

## 2025-01-08 RX ORDER — LABETALOL HYDROCHLORIDE 5 MG/ML
5 INJECTION, SOLUTION INTRAVENOUS
Status: DISCONTINUED | OUTPATIENT
Start: 2025-01-08 | End: 2025-01-08 | Stop reason: HOSPADM

## 2025-01-08 RX ORDER — HYDROMORPHONE HYDROCHLORIDE 1 MG/ML
0.4 INJECTION, SOLUTION INTRAMUSCULAR; INTRAVENOUS; SUBCUTANEOUS
Status: DISCONTINUED | OUTPATIENT
Start: 2025-01-08 | End: 2025-01-08 | Stop reason: HOSPADM

## 2025-01-08 RX ORDER — HYDRALAZINE HYDROCHLORIDE 20 MG/ML
5 INJECTION INTRAMUSCULAR; INTRAVENOUS
Status: DISCONTINUED | OUTPATIENT
Start: 2025-01-08 | End: 2025-01-08 | Stop reason: HOSPADM

## 2025-01-08 RX ORDER — MEPERIDINE HYDROCHLORIDE 25 MG/ML
6.25 INJECTION INTRAMUSCULAR; INTRAVENOUS; SUBCUTANEOUS
Status: DISCONTINUED | OUTPATIENT
Start: 2025-01-08 | End: 2025-01-08 | Stop reason: HOSPADM

## 2025-01-08 RX ORDER — SODIUM CHLORIDE, SODIUM LACTATE, POTASSIUM CHLORIDE, CALCIUM CHLORIDE 600; 310; 30; 20 MG/100ML; MG/100ML; MG/100ML; MG/100ML
INJECTION, SOLUTION INTRAVENOUS CONTINUOUS
Status: ACTIVE | OUTPATIENT
Start: 2025-01-08 | End: 2025-01-08

## 2025-01-08 RX ORDER — CEFAZOLIN SODIUM 1 G/3ML
3 INJECTION, POWDER, FOR SOLUTION INTRAMUSCULAR; INTRAVENOUS ONCE
Status: COMPLETED | OUTPATIENT
Start: 2025-01-08 | End: 2025-01-08

## 2025-01-08 RX ORDER — MIDAZOLAM HYDROCHLORIDE 1 MG/ML
1 INJECTION INTRAMUSCULAR; INTRAVENOUS
Status: DISCONTINUED | OUTPATIENT
Start: 2025-01-08 | End: 2025-01-08 | Stop reason: HOSPADM

## 2025-01-08 RX ORDER — METOPROLOL TARTRATE 1 MG/ML
1 INJECTION, SOLUTION INTRAVENOUS
Status: DISCONTINUED | OUTPATIENT
Start: 2025-01-08 | End: 2025-01-08 | Stop reason: HOSPADM

## 2025-01-08 RX ORDER — DIPHENHYDRAMINE HYDROCHLORIDE 50 MG/ML
12.5 INJECTION INTRAMUSCULAR; INTRAVENOUS
Status: DISCONTINUED | OUTPATIENT
Start: 2025-01-08 | End: 2025-01-08 | Stop reason: HOSPADM

## 2025-01-08 RX ORDER — MIDAZOLAM HYDROCHLORIDE 1 MG/ML
INJECTION INTRAMUSCULAR; INTRAVENOUS PRN
Status: DISCONTINUED | OUTPATIENT
Start: 2025-01-08 | End: 2025-01-08 | Stop reason: SURG

## 2025-01-08 RX ORDER — HYDROMORPHONE HYDROCHLORIDE 1 MG/ML
0.1 INJECTION, SOLUTION INTRAMUSCULAR; INTRAVENOUS; SUBCUTANEOUS
Status: DISCONTINUED | OUTPATIENT
Start: 2025-01-08 | End: 2025-01-08 | Stop reason: HOSPADM

## 2025-01-08 RX ORDER — HALOPERIDOL 5 MG/ML
1 INJECTION INTRAMUSCULAR
Status: DISCONTINUED | OUTPATIENT
Start: 2025-01-08 | End: 2025-01-08 | Stop reason: HOSPADM

## 2025-01-08 RX ORDER — LIDOCAINE HYDROCHLORIDE 10 MG/ML
INJECTION, SOLUTION INFILTRATION; PERINEURAL
Status: DISCONTINUED | OUTPATIENT
Start: 2025-01-08 | End: 2025-01-08 | Stop reason: HOSPADM

## 2025-01-08 RX ORDER — METFORMIN HYDROCHLORIDE 500 MG/1
1000 TABLET, EXTENDED RELEASE ORAL DAILY
COMMUNITY

## 2025-01-08 RX ORDER — OXYCODONE HCL 5 MG/5 ML
10 SOLUTION, ORAL ORAL
Status: COMPLETED | OUTPATIENT
Start: 2025-01-08 | End: 2025-01-08

## 2025-01-08 RX ORDER — INSULIN LISPRO 100 [IU]/ML
2-9 INJECTION, SOLUTION INTRAVENOUS; SUBCUTANEOUS EVERY 6 HOURS
Status: DISCONTINUED | OUTPATIENT
Start: 2025-01-08 | End: 2025-01-08 | Stop reason: HOSPADM

## 2025-01-08 RX ADMIN — ONDANSETRON 4 MG: 2 INJECTION INTRAMUSCULAR; INTRAVENOUS at 11:15

## 2025-01-08 RX ADMIN — PROPOFOL 30 MG: 10 INJECTION, EMULSION INTRAVENOUS at 13:08

## 2025-01-08 RX ADMIN — HYDROMORPHONE HYDROCHLORIDE 0.5 MG: 2 INJECTION INTRAMUSCULAR; INTRAVENOUS; SUBCUTANEOUS at 13:15

## 2025-01-08 RX ADMIN — TRANEXAMIC ACID 1000 MG: 100 INJECTION, SOLUTION INTRAVENOUS at 13:29

## 2025-01-08 RX ADMIN — INSULIN LISPRO 3 UNITS: 100 INJECTION, SOLUTION INTRAVENOUS; SUBCUTANEOUS at 10:24

## 2025-01-08 RX ADMIN — PROPOFOL 30 MG: 10 INJECTION, EMULSION INTRAVENOUS at 13:28

## 2025-01-08 RX ADMIN — OXYCODONE HYDROCHLORIDE 10 MG: 5 SOLUTION ORAL at 14:02

## 2025-01-08 RX ADMIN — FENTANYL CITRATE 25 MCG: 50 INJECTION, SOLUTION INTRAMUSCULAR; INTRAVENOUS at 14:45

## 2025-01-08 RX ADMIN — PROPOFOL 30 MG: 10 INJECTION, EMULSION INTRAVENOUS at 13:20

## 2025-01-08 RX ADMIN — HYDROMORPHONE HYDROCHLORIDE 0.25 MG: 2 INJECTION INTRAMUSCULAR; INTRAVENOUS; SUBCUTANEOUS at 12:27

## 2025-01-08 RX ADMIN — FENTANYL CITRATE 100 MCG: 50 INJECTION, SOLUTION INTRAMUSCULAR; INTRAVENOUS at 11:08

## 2025-01-08 RX ADMIN — HYDROMORPHONE HYDROCHLORIDE 1 MG: 2 INJECTION INTRAMUSCULAR; INTRAVENOUS; SUBCUTANEOUS at 11:17

## 2025-01-08 RX ADMIN — PROPOFOL 200 MG: 10 INJECTION, EMULSION INTRAVENOUS at 11:08

## 2025-01-08 RX ADMIN — PROPOFOL 40 MG: 10 INJECTION, EMULSION INTRAVENOUS at 13:00

## 2025-01-08 RX ADMIN — ALBUTEROL SULFATE 2.5 MG: 2.5 SOLUTION RESPIRATORY (INHALATION) at 13:57

## 2025-01-08 RX ADMIN — PROPOFOL 30 MG: 10 INJECTION, EMULSION INTRAVENOUS at 13:11

## 2025-01-08 RX ADMIN — PROPOFOL 30 MG: 10 INJECTION, EMULSION INTRAVENOUS at 13:23

## 2025-01-08 RX ADMIN — FENTANYL CITRATE 50 MCG: 50 INJECTION, SOLUTION INTRAMUSCULAR; INTRAVENOUS at 13:30

## 2025-01-08 RX ADMIN — FENTANYL CITRATE 50 MCG: 50 INJECTION, SOLUTION INTRAMUSCULAR; INTRAVENOUS at 11:51

## 2025-01-08 RX ADMIN — FENTANYL CITRATE 50 MCG: 50 INJECTION, SOLUTION INTRAMUSCULAR; INTRAVENOUS at 12:50

## 2025-01-08 RX ADMIN — DEXAMETHASONE SODIUM PHOSPHATE 4 MG: 4 INJECTION INTRA-ARTICULAR; INTRALESIONAL; INTRAMUSCULAR; INTRAVENOUS; SOFT TISSUE at 11:15

## 2025-01-08 RX ADMIN — PROPOFOL 30 MG: 10 INJECTION, EMULSION INTRAVENOUS at 13:17

## 2025-01-08 RX ADMIN — ESMOLOL HYDROCHLORIDE 20 MG: 100 INJECTION, SOLUTION INTRAVENOUS at 11:18

## 2025-01-08 RX ADMIN — MIDAZOLAM HYDROCHLORIDE 2 MG: 1 INJECTION, SOLUTION INTRAMUSCULAR; INTRAVENOUS at 11:04

## 2025-01-08 RX ADMIN — PROPOFOL 30 MG: 10 INJECTION, EMULSION INTRAVENOUS at 13:26

## 2025-01-08 RX ADMIN — KETOROLAC TROMETHAMINE 15 MG: 15 INJECTION, SOLUTION INTRAMUSCULAR; INTRAVENOUS at 13:20

## 2025-01-08 RX ADMIN — FENTANYL CITRATE 100 MCG: 50 INJECTION, SOLUTION INTRAMUSCULAR; INTRAVENOUS at 11:30

## 2025-01-08 RX ADMIN — HYDROMORPHONE HYDROCHLORIDE 0.25 MG: 2 INJECTION INTRAMUSCULAR; INTRAVENOUS; SUBCUTANEOUS at 12:23

## 2025-01-08 RX ADMIN — SUCCINYLCHOLINE CHLORIDE 100 MG: 20 INJECTION, SOLUTION INTRAMUSCULAR; INTRAVENOUS at 11:09

## 2025-01-08 RX ADMIN — PROPOFOL 40 MG: 10 INJECTION, EMULSION INTRAVENOUS at 12:56

## 2025-01-08 RX ADMIN — SODIUM CHLORIDE, POTASSIUM CHLORIDE, SODIUM LACTATE AND CALCIUM CHLORIDE: 600; 310; 30; 20 INJECTION, SOLUTION INTRAVENOUS at 09:52

## 2025-01-08 RX ADMIN — PROPOFOL 30 MG: 10 INJECTION, EMULSION INTRAVENOUS at 13:14

## 2025-01-08 RX ADMIN — CEFAZOLIN 3 G: 1 INJECTION, POWDER, FOR SOLUTION INTRAMUSCULAR; INTRAVENOUS at 11:12

## 2025-01-08 ASSESSMENT — PAIN DESCRIPTION - PAIN TYPE
TYPE: SURGICAL PAIN

## 2025-01-08 ASSESSMENT — FIBROSIS 4 INDEX: FIB4 SCORE: 0.19

## 2025-01-08 NOTE — ANESTHESIA PREPROCEDURE EVALUATION
Case: [id number] Date/Time: 01/08/25 1030    Procedures:       LEFT ANKLE ARTHROSCOPY, SUBTALAR FUSION, MEDIAL/LATERAL LIGAMENT RECONSTRUCTION, REPAIRS AS INDICATED (Left: Ankle)      FUSION AND ARTHROEREISIS, JOINT, FOOT AND ANKLE (Left: Ankle)      ARTHROSCOPY, ANKLE (Left: Ankle)    Diagnosis: Ankle instability, left [M25.372]    Pre-op diagnosis: Ankle instability, left [M25.372]    Location: [place] [address] / SURGERY [facility]    Surgeons: Nik Hurd M.D.          24F PMHx morbid obesity, DM, LORENZA, left ankle arthritis.   Relevant Problems   ANESTHESIA   (positive) Sleep apnea      GI   (positive) Gastroesophageal reflux disease without esophagitis      ENDO   (positive) Type 2 diabetes mellitus without complication, without long-term current use of insulin (HCC)       Physical Exam    Airway   Mallampati: II  TM distance: >3 FB  Neck ROM: full       Cardiovascular - normal exam  Rhythm: regular  Rate: normal  (-) murmur     Dental - normal exam           Pulmonary - normal exam     Abdominal   (+) obese     Neurological - normal exam                 Discussed benefits and risks of peripheral nerve block and patient declined block  Anesthesia Plan    ASA 3   ASA physical status 3 criteria: morbid obesity - BMI greater than or equal to 40    Plan - general       Airway plan will be ETT          Induction: intravenous    Postoperative Plan: Postoperative administration of opioids is intended.    Pertinent diagnostic labs and testing reviewed    Informed Consent:    Anesthetic plan and risks discussed with patient.    Use of blood products discussed with: patient whom consented to blood products.

## 2025-01-08 NOTE — PROGRESS NOTES
Medication history reviewed with PT at bedside    [facility] is complete per PT reporting    Allergies reviewed.     Patient denies any outpatient antibiotics in the last 30 days.     Patient is not taking anticoagulants.    Preferred pharmacy for this visit - [facility] ([phone])
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
## 2025-01-08 NOTE — OR NURSING
Patient awake and alert and resting comfortably in bed. Pt has been updated on plan of care and all questions have been answered. VSS. Dressing is CDI.     Pt states pain is tolerable for discharge. No nausea reported.     Pt friend called and updated on plan of care.     Pt requesting rx to be switched to [facility]. Updated MD Hurd and he stated he will work on it.     Report called to Norma MAYORGA. Pt taken to stage 2 in stable condition.

## 2025-01-08 NOTE — LETTER
December 9, 2024    Patient Name: Katie Graham  Surgeon Name: Nik Hurd M.D.  Surgery Facility: [facility] ([address] [facility]  Surgery Date: 1/8/2025    The time of your surgery is not final and may change up to and until the day of your surgery. You will be contacted 24-48 hours prior to your surgery date with your check-in and surgery time.    If you will not be at one of the below numbers please call the surgery scheduler at [phone]  Preferred Phone: [phone]    BEFORE YOUR SURGERY   Pre Registration and/or Lab Work must be done within and no earlier than 28 days prior to your surgery date. Your scheduled facility will contact you regarding all required preregistration and/or lab work. If you have not been contacted within 7 days of your scheduled procedure please call [facility] at [phone] for an appointment as soon as possible.    The [facility] offers a class for patients undergoing a total hip/knee replacement surgery scheduled at our outpatient surgery center. Information about what to expect for preparation, the day of surgery and recovery will be given. We highly recommend bringing your support for surgery with you to the class, as well as any questions you may have. If you are interested in attending the class, please call [phone] for scheduling.     Pre op Appointment:  Instructions: Bring a list of all medications you are taking including the dosing and frequency.    DAY OF YOUR SURGERY  Nothing to eat or drink after midnight     Refrain from smoking any substance after midnight prior to surgery. Smoking may interfere with the anesthetic and frequently produces nausea during the recovery period.    Continue taking all lifesaving medications. Including the morning of your surgery with small sip of water.    Please do NOT take on the day of surgery:  Diuretics: examples- furosemide (Lasix),  spironolactone, hydrochlorothiazide  ACE-inhibitors: examples- lisinopril, ramipril, enalapril  “ARBs”: examples- losartan, Olmesartan, valsartan    Please arrive at the hospital/surgery center at the check-in time provided.     An adult will need to bring you and take you home after your surgery.     AFTER YOUR SURGERY  Post op Appointment:   Date: 01/23/25   Time: 10:00AM   With: Nik Hurd MD   Location: [address] [place] [address]     TIME OFF WORK  FMLA or Disability forms can be faxed directly to: [phone] or you may drop them off at [place] [address]. Our office charges a $35.00 fee per form. Forms will be completed within 10 business days of drop off and payment received. For the status of your forms you may contact our disability office directly [phone].    MEDICATION INSTRUCTIONS **Please read section completely**  The following medications should be stopped a minimum of 10 days prior to surgery:  All over the counter, Supplements & Herbal medications    Anorectics: Phentermine (Adipex-P, Lomaira and Suprenza), Phentermine-topiramate (Qsymia), Bupropion-naltrexone (Contrave)    **If you are on Bupropion for anxiety/depression, please continue this medication up until the day of surgery.     Opiod Partial Agonists/Opioid Antagonists: Buprenorphine (Suboxone, Belbuca, Butrans, Probuphine Implant, Sublocade), Naltrexone (ReVia, Vivitrol), Naloxone    Amphetamines: Dextroamphetamine/Amphetamine (Adderall, Mydayis), Methylphenidate Hydrochloride (Concerta, Metadate, Methylin, Ritalin)    The following medications should be stopped 5 days prior to surgery:  Blood Thinners: Any Aspirin, Aspirin products, anti-inflammatories such as ibuprofen and any blood thinners such as Coumadin and Plavix. Please consult your prescribing physician if you are on life saving blood thinners, in regards to when to stop medications prior to surgery.     The following medications should  be stopped a minimum of 3 days prior to surgery:  PDE-5 inhibitors: Sildenafil (Viagra), Tadalafil (Cialis), Vardenafil (Levitra), Avanafil (Stendra)    MAO Inhibitors: Rasagiline (Azilect), Selegiline (Eldepryl, Emsam, Selapar), Isocarboxazid (Marplan), Phenelzine (Nardil)       COVID and INFLUENZA NOTICE TO PATIENTS    Currently, the [facility] does not routinely test patients for COVID-19 or Influenza prior to their elective surgery.  However, if patients develop the following symptoms prior to their surgery date, they should voluntarily test for COVID-19 and Influenza and notify the surgical office of their condition and results.  The symptoms warranting testing would be any two of the following:    Fever (Temp above 100.4 F)  Chills  Cough  Shortness of breath or difficulty breathing  Fatigue  Myalgias (muscle or body aches)  Headache  Sore Throat  Congestion or Runny Nose  Nausea or vomiting  Diarrhea  New loss of taste or smell    Having these symptoms prior to surgery can significantly increase your risk of morbidity and mortality under anesthesia, which may compromise your health and require a transfer to a hospital for a higher level of care.  Therefore, it is advisable to notify the surgical team of any illness in order to get information for the appropriate time to delay the surgery to minimize these preventable risks.    Your health and safety are our number one priority at the [facility], and we are thankful that you entrust us with your care.  Please help us ensure the best possible surgical and anesthetic outcome by sharing appropriate health information with our perioperative team and staff.  We look forward to taking great care of you!    Thank you for your time and consideration on this matter.    Giovanny Friend MD  Medical Director  Anesthesiologist  [facility]

## 2025-01-08 NOTE — OP REPORT
01/08/25       PREOPERATIVE DIAGNOSES:  Left ankle instability  Left ankle impingement  Left history of clubfoot     POSTOPERATIVE DIAGNOSES:  Same    PROCEDURE PERFORMED:  Ankle arthroscopic extensive debridement  Left subtalar fusion  Left medial column release of the foot including joint capsules and tendon lengthening  Left cotton osteotomy  Left templating and manipulating allograft for placement and cotton osteotomy  Left modified Broström secondary reconstruction of the ATFL and CFL ligaments     SURGEON:  Nik Hurd MD     FIRST ASSISTANT: Henrik Escobar MD     SECOND ASSISTANT:  Helena Ortega CFA     ANESTHESIA:  General endotracheal with regional block per my request postoperative pain management     ESTIMATED BLOOD LOSS:  None.     COMPLICATIONS:  None.    FINDINGS:  See preoperative diagnosis     POSTOPERATIVE PLAN:  Nonweightbearing  Follow-up in 2 weeks     INDICATIONS:  The patient is a pleasant 24 y.o. person who has had   problems with the left foot.  Options were discussed   including operative and nonoperative.  The patients elected to undergo operative   intervention.  The above procedure was discussed.  All questions were   answered.  Risks of surgery explained, which included but not limited to   explained wound problems, infection, nerve injury, vascular injury, need for   further surgery.  The patient understands that they could have persistent risk of    pain and need for further surgery.  The patients understands and accepts these risks   and agreed to proceed.  Site was marked by myself prior to receiving   psychotropic medicines.     PROCEDURE IN DETAIL:  The patient was brought to the operating room and    underwent general endotracheal anesthetic without complications.  Left lower   extremity was prepped and draped in standard fashion in supine position with   all appropriate padding.  Positive site verification confirmed the appropriate   extremity as well as above  procedure and confirmation that the patient received   preoperative antibiotics.  The leg was elevated and tourniquet was inflated to 250 mmHg.    An 18-gauge needle was placed medial to the preoperatively marked   tibialis anterior at the level of the joint line.  Ankle was insufflated with   10 mL normal saline.  A 15 blade was used to make a full-thickness arthrotomy   and 4.0 scope was introduced.  An 18-gauge needle was then placed lateral to   the preoperative marked peroneus tertius at the level of joint line.  Skin   incision was made and blunt dissection was made down to the joint.  Shaver was   introduced.  There was extensive arthrofibrosis and synovitis at the   anterolateral aspect of her ankle down to the lateral gutter.  This was all   debrided.  There was also had some hypertrophic tissue in the syndesmosis, which   was also debrided.  All instrumentation was removed and the scope was placed into the lateral portal and shaver was introduced through the medial portal.  Debridement was perfomed in the anteromedial ankle and down into the medial gutter.  Close evaluation of cartilage demonstrated no abnormalities.  He has no degenerative changes.  He had no softening or cracks.  Portals were closed with interrupted nylon.    A sinus Tarsi incision was made starting at the distal tip of the fibula extending towards the calcaneocuboid joint.  Dissection was made down through the sinus Tarsi exposing the posterior and medial facets.  The joint was released with a Gordon elevator for mobilization.  A lamina  was used to further expose the posterior and middle facet joints.  Using a combination of curettes and osteotomes all cartilage and soft tissue was denuded out of the posterior facet, middle facet and sinus Tarsi area.  Once down to good subchondral bone a drill bit was used to make multiple perforations in the posterior middle facets and an osteotome was used to further morselized the joint  surfaces.  The calcaneus was then reduced underneath the talus with a hindfoot in a slightly valgus alignment.  A guidepin from the [facility] 7.3 cannulated screw was placed from the calcaneus into the neck of the talus.  Its position was confirmed on C-arm imaging including Smith and lateral views.  An incision was made over the heel at the insertion site of the pin.  The pin was measured and a fully threaded 7.3 cannulated screw with a washer was placed over the guidepin after predrilling with a lag screw technique.  The screw had excellent purchase.  C-arm imaging demonstrated satisfactory position of internal fixation and alignment of the subtalar joint.    At this point she still had a lot of forefoot adduction and varus.  Made a medial incision starting the tip of her medial malleolus and extending towards her navicular.  Is dissected down.  The posterior os tendon sheath was opened up.  The significant scarring of the posterior tibialis tendon since it was abnormal with no normal motion.  This was lengthened.  In addition did a full capsulotomy of the talonavicular joint to help mobilize the midfoot.  This did help some with her abduction and with her forefoot varus however she still had some residual forefoot varus.    There was still with some residual forefoot varus.  With the C arm in lateral position with a good view parallel through the first tarsometatarsal joint a dorsal incision was made over the medial cuneiform.  Dissection was performed identifying and protecting the extensor hallucis longus.  Microsagittal saw was used to make a dorsal osteotomy through the medial cuneiform at approximate the level of the second TMT joint down to but not through the plantar cortex.  An osteotome was used to open this up.  A series of trials were placed into the medial cuneiform with a simulated dorsal widening.  A 10 mm template appeared to show the best correction and residual forefoot varus.  A allograft from  [facility] was then taken and a microsagittal was used to cut and manipulate this based on the template.  The allograft was then carefully tamped into place.  Once it was smooth across the dorsal margin clinical evaluation of the foot with a heel in slight valgus position demonstrated no residual forefoot varus.  C-arm imaging demonstrated satisfactory position of the allograft and alignment of the foot.      Returned back to the sinus Tarsi incision elevated up posterior and L following the posterior aspect of the fibula.  The peroneal tendons were retracted exposing the CFL.  This was released off its superior and inferior margins directly off the fibula.  The ATFL was then identified and released off its superior and inferior margins and directly off of the fibula.  Fibula periosteum was elevated up and a rongeur was used to rongeured down into good subchondral bone.  A 3.5 corkscrew anchor was placed at the junction of the ATFL and CFL.  One suture was brought through the ATFL the other was brought through the CFL.  The foot was held in plantarflexion E version and the CFL ligament was pretensioned and tied.  Foot was brought in dorsiflexion and eversion and the ATFL ligament was pretension tight.  Due to the chronicity of the instability, a secondary repair was felt to be necessary.  The extensor retinaculum was then repaired to the fibular periosteum with multiple 0 Vicryl's in a mattress type fashion.  The ankle had excellent stability to ATFL and CFL testing.  The wound was irrigated out the copious irrigation was closed with 3-0 Vicryl and 3-0 nylon.    Sterile dressings were applied.  Tourniquet was released.  She was placed into a posterior sugar-tong splint.  Patient was transferred to the recovery room in good condition.    Utilization of Dr. Escobar was necessary for patient positioning needing holding retracting wound closure and dressing placement.  The assistant was present throughout the entire  procedure.

## 2025-01-08 NOTE — LETTER
December 4, 2024    Patient Name: Katie Graham  Surgeon Name: Nik Hurd M.D.  Surgery Facility: [facility] ([address] [facility]  Surgery Date: 1/6/2025    The time of your surgery is not final and may change up to and until the day of your surgery. You will be contacted 24-48 hours prior to your surgery date with your check-in and surgery time.    If you will not be at one of the below numbers please call the surgery scheduler at [phone]  Preferred Phone: [phone]    BEFORE YOUR SURGERY   Pre Registration and/or Lab Work must be done within and no earlier than 28 days prior to your surgery date. Your scheduled facility will contact you regarding all required preregistration and/or lab work. If you have not been contacted within 7 days of your scheduled procedure please call [facility] at [phone] for an appointment as soon as possible.    The [facility] offers a class for patients undergoing a total hip/knee replacement surgery scheduled at our outpatient surgery center. Information about what to expect for preparation, the day of surgery and recovery will be given. We highly recommend bringing your support for surgery with you to the class, as well as any questions you may have. If you are interested in attending the class, please call [phone] for scheduling.     Pre op Appointment:  Instructions: Bring a list of all medications you are taking including the dosing and frequency.    DAY OF YOUR SURGERY  Nothing to eat or drink after midnight     Refrain from smoking any substance after midnight prior to surgery. Smoking may interfere with the anesthetic and frequently produces nausea during the recovery period.    Continue taking all lifesaving medications. Including the morning of your surgery with small sip of water.    Please do NOT take on the day of surgery:  Diuretics: examples- furosemide (Lasix),  spironolactone, hydrochlorothiazide  ACE-inhibitors: examples- lisinopril, ramipril, enalapril  “ARBs”: examples- losartan, Olmesartan, valsartan    Please arrive at the hospital/surgery center at the check-in time provided.     An adult will need to bring you and take you home after your surgery.     AFTER YOUR SURGERY  Post op Appointment:   Date: 01/21/25   Time: 10:00AM   With: Nik Hurd MD   Location: [address] [place] [address]     TIME OFF WORK  FMLA or Disability forms can be faxed directly to: [phone] or you may drop them off at [facility] [place] [address]. Our office charges a $35.00 fee per form. Forms will be completed within 10 business days of drop off and payment received. For the status of your forms you may contact our disability office directly [phone].    MEDICATION INSTRUCTIONS **Please read section completely**  The following medications should be stopped a minimum of 10 days prior to surgery:  All over the counter, Supplements & Herbal medications    Anorectics: Phentermine (Adipex-P, Lomaira and Suprenza), Phentermine-topiramate (Qsymia), Bupropion-naltrexone (Contrave)    **If you are on Bupropion for anxiety/depression, please continue this medication up until the day of surgery.     Opiod Partial Agonists/Opioid Antagonists: Buprenorphine (Suboxone, Belbuca, Butrans, Probuphine Implant, Sublocade), Naltrexone (ReVia, Vivitrol), Naloxone    Amphetamines: Dextroamphetamine/Amphetamine (Adderall, Mydayis), Methylphenidate Hydrochloride (Concerta, Metadate, Methylin, Ritalin)    The following medications should be stopped 5 days prior to surgery:  Blood Thinners: Any Aspirin, Aspirin products, anti-inflammatories such as ibuprofen and any blood thinners such as Coumadin and Plavix. Please consult your prescribing physician if you are on life saving blood thinners, in regards to when to stop medications prior to surgery.     The following medications should  be stopped a minimum of 3 days prior to surgery:  PDE-5 inhibitors: Sildenafil (Viagra), Tadalafil (Cialis), Vardenafil (Levitra), Avanafil (Stendra)    MAO Inhibitors: Rasagiline (Azilect), Selegiline (Eldepryl, Emsam, Selapar), Isocarboxazid (Marplan), Phenelzine (Nardil)       COVID and INFLUENZA NOTICE TO PATIENTS    Currently, the [facility] does not routinely test patients for COVID-19 or Influenza prior to their elective surgery.  However, if patients develop the following symptoms prior to their surgery date, they should voluntarily test for COVID-19 and Influenza and notify the surgical office of their condition and results.  The symptoms warranting testing would be any two of the following:    Fever (Temp above 100.4 F)  Chills  Cough  Shortness of breath or difficulty breathing  Fatigue  Myalgias (muscle or body aches)  Headache  Sore Throat  Congestion or Runny Nose  Nausea or vomiting  Diarrhea  New loss of taste or smell    Having these symptoms prior to surgery can significantly increase your risk of morbidity and mortality under anesthesia, which may compromise your health and require a transfer to a hospital for a higher level of care.  Therefore, it is advisable to notify the surgical team of any illness in order to get information for the appropriate time to delay the surgery to minimize these preventable risks.    Your health and safety are our number one priority at the [facility], and we are thankful that you entrust us with your care.  Please help us ensure the best possible surgical and anesthetic outcome by sharing appropriate health information with our perioperative team and staff.  We look forward to taking great care of you!    Thank you for your time and consideration on this matter.    Giovanny Friend MD  Medical Director  Anesthesiologist  [facility]

## 2025-01-08 NOTE — ANESTHESIA TIME REPORT
Anesthesia Start and Stop Event Times       Date Time Event    1/8/2025 1050 Ready for Procedure     1104 Anesthesia Start     1342 Anesthesia Stop          Responsible Staff  01/08/25      Name Role Begin End    Kike Gavin M.D. Anesth 1104 1342          Overtime Reason:  no overtime (within assigned shift)    Comments:

## 2025-01-08 NOTE — ANESTHESIA PROCEDURE NOTES
Airway    Date/Time: 1/8/2025 11:10 AM    Performed by: Kike Gavin M.D.  Authorized by: Kike Gavin M.D.    Location:  OR  Urgency:  Elective  Indications for Airway Management:  Anesthesia      Spontaneous Ventilation: absent    Sedation Level:  Deep  Preoxygenated: Yes    Patient Position:  Sniffing  Mask Difficulty Assessment:  0 - not attempted  Final Airway Type:  Endotracheal airway  Final Endotracheal Airway:  ETT  Cuffed: Yes    Technique Used for Successful ETT Placement:  Video laryngoscopy    Insertion Site:  Oral  Blade Type:  Glide  Laryngoscope Blade/Videolaryngoscope Blade Size:  3  ETT Size (mm):  7.0  Measured from:  Teeth  ETT to Teeth (cm):  22  Placement Verified by: capnometry    Cormack-Lehane Classification:  Grade I - full view of glottis  Number of Attempts at Approach:  1   Patient has peritonsillar stones. Atraumatic

## 2025-01-09 NOTE — OR NURSING
Discharge instructions to pt and friend. Both verbalized understanding. Wheelchair  to car for discharge.

## 2025-01-09 NOTE — ANESTHESIA POSTPROCEDURE EVALUATION
Patient: Katie Graham    Procedure Summary       Date: 01/08/25 Room / Location: [place] [address] / SURGERY [facility]    Anesthesia Start: 1104 Anesthesia Stop: 1342    Procedures:       LEFT LATERAL LIGAMENT RECONSTRUCTION, MEDIAL CAPSULAR RELEASE (Left: Ankle)      SUBTALAR FUSION (Left: Foot)      ANKLE ARTHROSCOPY (Left: Ankle)      COTTON OSTEOTOMY WITH ALLOGRAFT TEMPLATING (Left: Ankle) Diagnosis:       Ankle instability, left      (Ankle instability, left, club foot)    Surgeons: Nik Hurd M.D. Responsible Provider: Kike Gavin M.D.    Anesthesia Type: general ASA Status: 3            Final Anesthesia Type: general  Last vitals  BP   Blood Pressure: 123/56    Temp   36.2 °C (97.1 °F)    Pulse   100   Resp   (!) 24    SpO2   95 %      Anesthesia Post Evaluation    Patient location during evaluation: PACU  Patient participation: complete - patient participated  Level of consciousness: awake and alert    Airway patency: patent  Anesthetic complications: no  Cardiovascular status: hemodynamically stable  Respiratory status: acceptable  Hydration status: euvolemic    PONV: none          No notable events documented.     Nurse Pain Score: 4 (NPRS)

## 2025-02-05 ENCOUNTER — APPOINTMENT (OUTPATIENT)
Dept: PHYSICAL THERAPY | Facility: REHABILITATION | Age: 25
End: 2025-02-05
Payer: MEDICAID

## 2025-02-26 ENCOUNTER — APPOINTMENT (OUTPATIENT)
Dept: PHYSICAL THERAPY | Facility: REHABILITATION | Age: 25
End: 2025-02-26
Payer: MEDICAID

## 2025-02-28 ENCOUNTER — PHYSICAL THERAPY (OUTPATIENT)
Dept: PHYSICAL THERAPY | Facility: REHABILITATION | Age: 25
End: 2025-02-28
Payer: MEDICAID

## 2025-02-28 DIAGNOSIS — Q79.60 EHLERS-DANLOS DISEASE: ICD-10-CM

## 2025-02-28 PROCEDURE — 97162 PT EVAL MOD COMPLEX 30 MIN: CPT

## 2025-02-28 PROCEDURE — 999999 HB NO CHARGE

## 2025-02-28 NOTE — OP THERAPY EVALUATION
Outpatient Physical Therapy  INITIAL EVALUATION    [facility] Physical [address]  [address]  [facility] [address]  Phone:  [phone]  Fax:  [phone]    Date of Evaluation: 02/28/2025    Patient: Katie Graham  YOB: 2000  MRN: [id number]     Referring Provider: Leslie Aguero M.D.  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 She is still NWB and using a knee scooter for another 2 weeks. She states she was told that the graft is rejecting so she is having a follow up in 2 weeks to discuss this with the surgeon. Otherwise pain is about 6/10 and managed well with meds, although sleeping for the first time without the cast last night was difficult. She reports that EDS and POTS was recently diagnosed and she consistently struggles with subluxations of both knees and multiple fingers as well as having chronic low back and coat  pain.    Pronouns: she/her    Last Surgery: Left Lateral Ligament Reconstruction, Medial Capsular Release - Left, Subtalar Fusion - Left, Ankle Arthroscopy - Left, and Cotton Osteotomy With Allograft Templating   Patient Goals:     Other patient goals:  Be able to go up stairs for self defense class once released from NWB status, learn weight training and strengthening probrem for EDS      Past Medical History:   Diagnosis Date    Allergy     Apples cause migrains and fainting    Anxiety     Asthma     Borderline personality disorder  ([facility])     Depression     Family history of diabetes mellitus 11/04/2020    GERD (gastroesophageal reflux disease)     Head ache     History of meka     Mast cell activation syndrome ([facility])     Migraine     POTS (postural orthostatic tachycardia syndrome)     Seizure ([facility])     FROM SEVERE STRESS - LAST 2018    Substance abuse ([facility])     xanax and alcohol in highschool    Suicidal ideation 09/24/2021    Type 2 diabetes, diet controlled ([facility]) 10/04/2023    History of A1c's up to 8.3  Patient checks home BG twice daily  Controls with diet  Has not had A1c in over a year       UGIB (upper gastrointestinal bleed) 10/23/2023    Urinary bladder disorder 12/23/2024    FREQ UTI'S     Past Surgical History:   Procedure Laterality Date    ANKLE LIGAMENT RECONSTRUCTION Left 1/8/2025    Procedure: LEFT LATERAL LIGAMENT RECONSTRUCTION, MEDIAL CAPSULAR RELEASE;  Surgeon: Nik Hurd M.D.;  Location: [facility];  Service: Orthopedics    FUSION AND ARTHROEREISIS, JOINT, FOOT AND ANKLE Left 1/8/2025    Procedure: SUBTALAR FUSION;  Surgeon: Nik Hurd M.D.;  Location: [facility];  Service: Orthopedics    ANKLE ARTHROSCOPY Left 1/8/2025    Procedure: ANKLE ARTHROSCOPY;  Surgeon: Nik Hurd M.D.;  Location: [facility];  Service: Orthopedics    ORTHOPEDIC OSTEOTOMY Left 1/8/2025    Procedure: COTTON OSTEOTOMY WITH ALLOGRAFT TEMPLATING;  Surgeon: Nik Hurd M.D.;  Location: [facility];  Service: Orthopedics    AL UPPER GI ENDOSCOPY,DIAGNOSIS N/A 10/24/2023    Procedure: GASTROSCOPY;  Surgeon: Jamison Jones M.D.;  Location: SURGERY SAME DAY [facility];  Service: Gastroenterology    AL UPPER GI ENDOSCOPY,BIOPSY N/A 10/24/2023    Procedure: GASTROSCOPY, WITH BIOPSY;  Surgeon: Jamison Jones M.D.;  Location: SURGERY SAME DAY [facility];  Service: Gastroenterology    AL COLONOSCOPY-FLEXIBLE N/A 9/28/2023    Procedure: COLONOSCOPY;  Surgeon: Jamison Jones M.D.;  Location: SURGERY SAME DAY  [facility];  Service: Gastroenterology    AZ COLONOSCOPY,BIOPSY N/A 9/28/2023    Procedure: COLONOSCOPY, WITH BIOPSY;  Surgeon: Jamison Jones M.D.;  Location: SURGERY SAME DAY [facility];  Service: Gastroenterology    LAPAROSCOPIC UMBILICAL HERNIA REPAIR  2002     Social History     Tobacco Use    Smoking status: Former     Current packs/day: 0.50     Average packs/day: 0.4 packs/day for 1.6 years (0.7 ttl pk-yrs)     Types: Cigarettes     Start date: 8/14/2018     Quit date: 11/4/2018    Smokeless tobacco: Never    Tobacco comments:     Quit after 3 months   Substance Use Topics    Alcohol use: Not Currently     Comment: occasionally     Family and Occupational History     Socioeconomic History    Marital status: Single     Spouse name: Not on file    Number of children: 0    Years of education: Not on file    Highest education level: 12th grade   Occupational History    Occupation: unemployed       Objective     Tenderness   Left Ankle/Foot   Tenderness in the dorsum foot and peroneal tendon.     Active Range of Motion   Left Ankle/Foot   Dorsiflexion (ke): 0 degrees   Dorsiflexion (kf): 1 degrees   Plantar flexion: 28 degrees   Inversion: 15 degrees   Eversion: 4 degrees   Great toe extension: 39 degrees     Right Ankle/Foot   Dorsiflexion (ke): 10 degrees   Plantar flexion: 60 degrees   Inversion: 30 degrees   Eversion: 8 degrees   Great toe extension: 55 degrees     Strength:      Left Ankle/Foot   Dorsiflexion: 3+  Plantar flexion: 3+ (painful)  Inversion: 3-  Eversion: 3+  Great toe flexion: 4  Great toe extension: 4    Right Ankle/Foot   Normal strength    Additional Strength Details  Knee flexion L 4-/5, R 5/5  Knee extension L 4/5, R 5/5        Therapeutic Exercises (CPT 01300):     2. Ankle AROM PF/DF/ev/inv, progress to circles when tolerable    3. Towel scrunches    4. heel slides seated    5. Gastroc stretch with belt, only felt in midfoot, not in gastroc - hold if no stretch      Time-based  treatments/modalities:           Assessment, Response and Plan:   Impairments: abnormal gait, abnormal or restricted ROM, impaired functional mobility, hypersensitivity, impaired physical strength, lacks appropriate home exercise program and pain with function    Assessment details:  Pt is a 23 yo female who presents to physical therapy s/p L ankle reconstruction performed by Dr. Hurd on 1/8/25. PT finds s/sx consistent with post operative status with pain and mobility deficits currently present. Pt understands post operative precautions and is appropriately maintaining NWB using a knee scooter. Pt will benefit from skilled therapeutic interventions in order to return to PLOF and maximize function then eventually transitioning care to generalized strengthening for EDS 2/2 chronic back pain and multiple joint subluxations.    Barriers to therapy:  Comorbidities  Prognosis: fair    Prognosis details:  Pending potential graft rejection  Goals:   Short Term Goals:   Improve L ankle AROM PF from 28 to 38deg  Improve L ankle AROM DF from 0 to 5deg   Improve L big toe PROM to 50deg to improve potential walking mechanics  Fill out LEFS  Short term goal time span:  4-6 weeks      Long Term Goals:    Ambulate without normal gait pattern without boot  Confident in independent HEP with focus on weight training and joint stability  LEFS >50/80 to return to PLOF  Long term goal time span:  2-4 months    Plan:   Therapy options:  Physical therapy treatment to continue  Planned therapy interventions:  Therapeutic Exercise (CPT 90758)  Other planned therapy interventions:  97110 x 4  Frequency:  2x week  Duration in weeks:  10  Discussed with:  Patient      Functional Assessment Used        Referring provider co-signature:  I have reviewed this plan of care and my co-signature certifies the need for services.    Certification Period: 02/28/2025 to  05/23/25    Physician Signature: ________________________________ Date:  ______________

## 2025-03-05 ENCOUNTER — APPOINTMENT (OUTPATIENT)
Dept: PHYSICAL THERAPY | Facility: REHABILITATION | Age: 25
End: 2025-03-05
Payer: MEDICAID

## 2025-03-07 ENCOUNTER — APPOINTMENT (OUTPATIENT)
Dept: PHYSICAL THERAPY | Facility: REHABILITATION | Age: 25
End: 2025-03-07
Payer: MEDICAID

## 2025-03-14 ENCOUNTER — APPOINTMENT (OUTPATIENT)
Dept: PHYSICAL THERAPY | Facility: REHABILITATION | Age: 25
End: 2025-03-14
Payer: MEDICAID

## 2025-03-19 ENCOUNTER — PHYSICAL THERAPY (OUTPATIENT)
Dept: PHYSICAL THERAPY | Facility: REHABILITATION | Age: 25
End: 2025-03-19
Payer: MEDICAID

## 2025-03-19 DIAGNOSIS — Q79.60 EHLERS-DANLOS DISEASE: ICD-10-CM

## 2025-03-19 PROCEDURE — 97110 THERAPEUTIC EXERCISES: CPT

## 2025-03-19 NOTE — OP THERAPY DAILY TREATMENT
Outpatient Physical Therapy  DAILY TREATMENT     [facility] Physical Therapy [address].  [address]  [place] [address]  Phone:  [phone]  Fax:  [phone]    Date: 03/19/2025    Patient: Katie Graham  YOB: 2000  MRN: [id number]     Time Calculation    Start time: 0945  Stop time: 1030 Time Calculation (min): 45 minutes         Chief Complaint: Foot Problem and Post-Op Pain    Visit #: 2    SUBJECTIVE:  Reports she is partial weightbearing now, hasn't had follow up with surgeon yet because she needs to get imaging prior, diligent with HEP 2x/day and feels that the foot is a little more mobile    Mechanism of injury:  Pt with history of EDS, today is her first day in the boot from being casted. She is still NWB and using a knee scooter for another 2 weeks. She states she was told that the graft is rejecting so she is having a follow up in 2 weeks to discuss this with the surgeon. Otherwise pain is about 6/10 and managed well with meds, although sleeping for the first time without the cast last night was difficult. She reports that EDS and POTS was recently diagnosed and she consistently struggles with subluxations of both knees and multiple fingers as well as having chronic low back and coat  pain.     Pronouns: she/her     Last Surgery: Left Lateral Ligament Reconstruction, Medial Capsular Release - Left, Subtalar Fusion - Left, Ankle Arthroscopy - Left, and Cotton Osteotomy With Allograft Templating   Patient Goals:     Other patient goals:  Be able to go up stairs for self defense class once released from NWB status, learn weight training and strengthening probrem for EDS    OBJECTIVE:  Current objective measures:           Therapeutic Exercises (CPT 32778):     2. Ankle AROM PF/DF/ev/inv, progress to circles when tolerable    3. Towel scrunches    4. heel slides seated    5. Gastroc stretch with belt, only felt in midfoot, not in gastroc - hold if no stretch     7. BAPS, 5min    8. SLR, 2x10    9. Sidelying hip abd, 2x10    10. Prone hip ext, 2x10    12. Subtalar mobs, ankle PROM      Time-based treatments/modalities:    Physical Therapy Timed Treatment Charges  Therapeutic exercise minutes (CPT 39209): 45 minutes      ASSESSMENT:   Response to treatment: demo'd improved PROM PF with manual techniques, introduced hip strengthening for improved proximal chain stability    Goals:   Short Term Goals:   Improve L ankle AROM PF from 28 to 38deg  Improve L ankle AROM DF from 0 to 5deg   Improve L big toe PROM to 50deg to improve potential walking mechanics  Fill out LEFS  Short term goal time span:  4-6 weeks      Long Term Goals:    Ambulate without normal gait pattern without boot  Confident in independent HEP with focus on weight training and joint stability  LEFS >50/80 to return to PLOF  Long term goal time span:  2-4 months    PLAN/RECOMMENDATIONS:   Plan for treatment: therapy treatment to continue next visit.  Planned interventions for next visit: continue with current treatment.

## 2025-03-20 ENCOUNTER — HOSPITAL ENCOUNTER (OUTPATIENT)
Dept: RADIOLOGY | Facility: MEDICAL CENTER | Age: 25
End: 2025-03-20
Payer: MEDICAID

## 2025-03-20 DIAGNOSIS — Z79.52 CURRENT CHRONIC USE OF SYSTEMIC STEROIDS: ICD-10-CM

## 2025-03-20 PROCEDURE — 77080 DXA BONE DENSITY AXIAL: CPT

## 2025-03-21 ENCOUNTER — APPOINTMENT (OUTPATIENT)
Dept: PHYSICAL THERAPY | Facility: REHABILITATION | Age: 25
End: 2025-03-21
Payer: MEDICAID

## 2025-03-26 ENCOUNTER — PHYSICAL THERAPY (OUTPATIENT)
Dept: PHYSICAL THERAPY | Facility: REHABILITATION | Age: 25
End: 2025-03-26
Payer: MEDICAID

## 2025-03-26 DIAGNOSIS — Q79.60 EHLERS-DANLOS DISEASE: ICD-10-CM

## 2025-03-26 PROCEDURE — 97110 THERAPEUTIC EXERCISES: CPT

## 2025-03-26 NOTE — OP THERAPY DAILY TREATMENT
Outpatient Physical Therapy  DAILY TREATMENT     [facility] Physical [address].  [address]  [place] [address]  Phone:  [phone]  Fax:  [phone]    Date: 03/26/2025    Patient: Katie Graham  YOB: 2000  MRN: [id number]     Time Calculation    Start time: 0950  Stop time: 1030 Time Calculation (min): 40 minutes         Chief Complaint: Foot Problem and Weakness    Visit #: 3    SUBJECTIVE:  Reports that her foot is popping more with some pain 60% of the time, otherwise feels that the foot is more mobile. Sees surgeon tomorrow for follow up    Mechanism of injury:  Pt with history of EDS, today is her first day in the boot from being casted. She is still NWB and using a knee scooter for another 2 weeks. She states she was told that the graft is rejecting so she is having a follow up in 2 weeks to discuss this with the surgeon. Otherwise pain is about 6/10 and managed well with meds, although sleeping for the first time without the cast last night was difficult. She reports that EDS and POTS was recently diagnosed and she consistently struggles with subluxations of both knees and multiple fingers as well as having chronic low back and coat  pain.     Pronouns: she/her     Last Surgery: Left Lateral Ligament Reconstruction, Medial Capsular Release - Left, Subtalar Fusion - Left, Ankle Arthroscopy - Left, and Cotton Osteotomy With Allograft Templating   Patient Goals:     Other patient goals:  Be able to go up stairs for self defense class once released from NWB status, learn weight training and strengthening probrem for EDS    OBJECTIVE:  Current objective measures:           Therapeutic Exercises (CPT 97450):     1. Recumbent bike 6min, for ankle ROM and knee/hip strengthening    3. Towel scrunches, HEP only    7. BAPS, 5min    8. SLR, 2x10, NT    9. Sidelying hip abd, 2x10, NT    10. Prone hip ext, 2x10, NT    12. 4 way ankle, piTB 2x10 ea      Time-based  treatments/modalities:    Physical Therapy Timed Treatment Charges  Therapeutic exercise minutes (CPT 11214): 40 minutes      ASSESSMENT:   Introduced light ankle strengthening open chain and will reassess next visit after surgeon follow up pending any changes    Goals:   Short Term Goals:   Improve L ankle AROM PF from 28 to 38deg  Improve L ankle AROM DF from 0 to 5deg   Improve L big toe PROM to 50deg to improve potential walking mechanics  Fill out LEFS  Short term goal time span:  4-6 weeks      Long Term Goals:    Ambulate without normal gait pattern without boot  Confident in independent HEP with focus on weight training and joint stability  LEFS >50/80 to return to PLOF  Long term goal time span:  2-4 months    PLAN/RECOMMENDATIONS:   Plan for treatment: therapy treatment to continue next visit.  Planned interventions for next visit: continue with current treatment.

## 2025-03-28 ENCOUNTER — PHYSICAL THERAPY (OUTPATIENT)
Dept: PHYSICAL THERAPY | Facility: REHABILITATION | Age: 25
End: 2025-03-28
Payer: MEDICAID

## 2025-03-28 DIAGNOSIS — Q79.60 EHLERS-DANLOS DISEASE: ICD-10-CM

## 2025-03-28 PROCEDURE — 97110 THERAPEUTIC EXERCISES: CPT

## 2025-03-28 NOTE — OP THERAPY DAILY TREATMENT
Outpatient Physical Therapy  DAILY TREATMENT     [facility] Physical Therapy [address].  [address]  [place] [address]  Phone:  [phone]  Fax:  [phone]    Date: 03/28/2025    Patient: Katie Graham  YOB: 2000  MRN: [id number]     Time Calculation    Start time: 0945  Stop time: 1025 Time Calculation (min): 40 minutes         Chief Complaint: Ankle Problem and Post-Op Pain    Visit #: 4    SUBJECTIVE:  Had follow up with surgeon yesterday and she is now cleared to wean out of the boot. She states that she feels much better being out the boot and her limp is less after walking for a few minutes    Mechanism of injury:  Pt with history of EDS, today is her first day in the boot from being casted. She is still NWB and using a knee scooter for another 2 weeks. She states she was told that the graft is rejecting so she is having a follow up in 2 weeks to discuss this with the surgeon. Otherwise pain is about 6/10 and managed well with meds, although sleeping for the first time without the cast last night was difficult. She reports that EDS and POTS was recently diagnosed and she consistently struggles with subluxations of both knees and multiple fingers as well as having chronic low back and coat  pain.     Pronouns: she/her     Last Surgery: Left Lateral Ligament Reconstruction, Medial Capsular Release - Left, Subtalar Fusion - Left, Ankle Arthroscopy - Left, and Cotton Osteotomy With Allograft Templating   Patient Goals:     Other patient goals:  Be able to go up stairs for self defense class once released from NWB status, learn weight training and strengthening probrem for EDS    OBJECTIVE:  Current objective measures:           Therapeutic Exercises (CPT 77637):     1. Physiostep L3, 10min, for ankle ROM and knee/hip strengthening    3. Towel scrunches, HEP only    4. Seated heel raises, to fatigue    5. Shuttle DL and SL, 5c leg press, 2x to fatigue ea    7.  BAPS, 5min    8. SLR, 2x10, NT    9. Sidelying hip abd, 2x10, NT    10. Prone hip ext, 2x10, NT    12. 4 way ankle, piTB 2x10 ea, HEP only    15. Feet together on foam, 2x30s    16. SLB, 2x30s ea      Time-based treatments/modalities:    Physical Therapy Timed Treatment Charges  Therapeutic exercise minutes (CPT 29974): 40 minutes      ASSESSMENT:   Ankle DF restriction causing heel lifting during physiostep and shuttle, otherwise tolerated intro to closed chain strengthening and able to perform heel raises DL for calf strengthening without increase of ankle pain    Goals:   Short Term Goals:   Improve L ankle AROM PF from 28 to 38deg  Improve L ankle AROM DF from 0 to 5deg   Improve L big toe PROM to 50deg to improve potential walking mechanics  Fill out LEFS  Short term goal time span:  4-6 weeks      Long Term Goals:    Ambulate without normal gait pattern without boot  Confident in independent HEP with focus on weight training and joint stability  LEFS >50/80 to return to PLOF  Long term goal time span:  2-4 months    PLAN/RECOMMENDATIONS:   Plan for treatment: therapy treatment to continue next visit.  Planned interventions for next visit: continue with current treatment.

## 2025-04-04 ENCOUNTER — PHYSICAL THERAPY (OUTPATIENT)
Dept: PHYSICAL THERAPY | Facility: REHABILITATION | Age: 25
End: 2025-04-04
Payer: MEDICAID

## 2025-04-04 DIAGNOSIS — Q79.60 EHLERS-DANLOS DISEASE: ICD-10-CM

## 2025-04-04 PROCEDURE — 97110 THERAPEUTIC EXERCISES: CPT

## 2025-04-04 NOTE — OP THERAPY DAILY TREATMENT
Outpatient Physical Therapy  DAILY TREATMENT     [facility] Physical Therapy [address].  [address]  [place] [address]  Phone:  [phone]  Fax:  [phone]    Date: 04/04/2025    Patient: Katie Graham  YOB: 2000  MRN: [id number]     Time Calculation    Start time: 0845  Stop time: 0938 Time Calculation (min): 53 minutes         Chief Complaint: Foot Problem    Visit #: 5    SUBJECTIVE:  Reports pain when walking and frustrated that the surgery has not shown to be relieving so far.    Mechanism of injury:  Pt with history of EDS, today is her first day in the boot from being casted. She is still NWB and using a knee scooter for another 2 weeks. She states she was told that the graft is rejecting so she is having a follow up in 2 weeks to discuss this with the surgeon. Otherwise pain is about 6/10 and managed well with meds, although sleeping for the first time without the cast last night was difficult. She reports that EDS and POTS was recently diagnosed and she consistently struggles with subluxations of both knees and multiple fingers as well as having chronic low back and coat  pain.     Pronouns: she/her     Last Surgery: Left Lateral Ligament Reconstruction, Medial Capsular Release - Left, Subtalar Fusion - Left, Ankle Arthroscopy - Left, and Cotton Osteotomy With Allograft Templating   Patient Goals:     Other patient goals:  Be able to go up stairs for self defense class once released from NWB status, learn weight training and strengthening probrem for EDS    OBJECTIVE:  Current objective measures:     Active Range of Motion   Left Ankle/Foot   Dorsiflexion (ke): 0 degrees   Dorsiflexion (kf): 1 degrees - 9deg  Plantar flexion: 28 degrees - 31deg  Inversion: 15 degrees - improved to 20deg  Eversion: 4 degrees - improved to 9deg  Great toe extension: 39 degrees - improved to 50deg     Right Ankle/Foot   Dorsiflexion (ke): 10 degrees   Plantar flexion: 60  degrees   Inversion: 30 degrees   Eversion: 8 degrees   Great toe extension: 55 degrees      Strength:       Left Ankle/Foot   Dorsiflexion: 3+ - immproved to 4+  Plantar flexion: 3+ (painful) - same  Inversion: 3- - ipmroved to 3+ painful  Eversion: 3+ - improved to 4/5  Great toe flexion: 4 - improved to 4+  Great toe extension: 4 - improved to 4+ painful     Right Ankle/Foot   Normal strength     Additional Strength Details  Knee flexion L 4-/5, R 5/5 - L improved to 5/5  Knee extension L 4/5, R 5/5 - L improved to 4+/5          Therapeutic Exercises (CPT 84646):     1. Physiostep L3, 10min, for ankle ROM and knee/hip strengthening    5. Shuttle DL and SL, 5c leg press, 2x to fatigue ea, NT    7. BAPS, 5min, NT    8. SLR, 2x10, NT    9. Sidelying hip abd, 2x10, NT    10. Prone hip ext, 2x10, NT    12. 4 way ankle, piTB 2x10 ea, HEP only    13. Tandem, 2x30s ea    14. Semi-tandem on foam, 2x30s ea    15. Feet together on foam, 2x30s    16. SLB, 2x30s ea    18. Objective measures, see above      Time-based treatments/modalities:    Physical Therapy Timed Treatment Charges  Therapeutic exercise minutes (CPT 48242): 53 minutes      ASSESSMENT:   Pt has completed 5 visits of PT meeting 2/3 short term goals and progressing toward long term goals. Dontae's improved ankle ROM and strength. Impairments continue to include ROM deficits (PF most limiting) and inversion and plantarflexion weakness, continue L toe out gait, and persistent pain. The patient will benefit from continued skilled therapy with focus on strength and ROM deficits in order to increase participation with daily tasks. The pt states she understands and agrees to the POC.      Goals:   Short Term Goals:   Improve L ankle AROM PF from 28 to 38deg - progressing, 31deg  Improve L ankle AROM DF from 0 to 5deg  - MET  Improve L big toe PROM to 50deg to improve potential walking mechanics -MET  Fill out LEFS - DNT, continue  Short term goal time span:  4-6  weeks      Long Term Goals:    Ambulate without normal gait pattern without boot - progressing  Confident in independent HEP with focus on weight training and joint stability - progressing  LEFS >50/80 to return to PLOF - DNT, continue  Long term goal time span:  2-4 months    PLAN/RECOMMENDATIONS:   Plan for treatment: therapy treatment to continue next visit.  Planned interventions for next visit: continue with current treatment.

## 2025-04-04 NOTE — OP THERAPY PROGRESS SUMMARY
Outpatient Physical Therapy  PROGRESS SUMMARY NOTE      [facility] Physical Therapy [address]  [address]  [place] [address]  Phone:  [phone]  Fax:  [phone]    Date of Visit: 04/04/2025    Patient: Katie Graham  YOB: 2000  MRN: [id number]     Referring Provider: Leslie Aguero M.D.  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 Demo's improved ankle ROM and strength. Impairments continue to include ROM deficits (PF most limiting) and inversion and plantarflexion weakness, continue L toe out gait, and persistent pain. The patient will benefit from continued skilled therapy with focus on strength and ROM deficits in order to increase participation with daily tasks. The pt states she understands and agrees to the POC.    Goals:   Short Term Goals:   Improve L ankle AROM PF from 28 to 38deg - progressing, 31deg  Improve L ankle AROM DF from 0 to 5deg  - MET  Improve L big toe PROM to 50deg to improve potential walking mechanics -MET  Fill out LEFS - DNT, continue    Short term goal time span:  2-4 weeks      Long Term Goals:    Ambulate without normal gait pattern without boot - progressing  Confident in independent HEP with focus on weight training and joint stability - progressing  LEFS >50/80 to return to PLOF - DNT, continue    Long term goal time span:  1-2 months    Plan:   Planned therapy interventions:  Therapeutic Exercise (CPT 74376)  Frequency:  2x week  Duration in weeks:  6      Referring  provider co-signature:  I have reviewed this plan of care and my co-signature certifies the need for services.     Certification Period: 04/04/2025 to 05/30/25    Physician Signature: ________________________________ Date: ______________

## 2025-04-11 ENCOUNTER — PHYSICAL THERAPY (OUTPATIENT)
Dept: PHYSICAL THERAPY | Facility: REHABILITATION | Age: 25
End: 2025-04-11
Payer: MEDICAID

## 2025-04-11 DIAGNOSIS — Q79.60 EHLERS-DANLOS DISEASE: ICD-10-CM

## 2025-04-11 PROCEDURE — 97110 THERAPEUTIC EXERCISES: CPT

## 2025-04-11 NOTE — OP THERAPY DAILY TREATMENT
"  Outpatient Physical Therapy  DAILY TREATMENT     [facility] Physical Therapy [address].  [address]  [place] [address]  Phone:  [phone]  Fax:  [phone]    Date: 04/11/2025    Patient: Katie Graham  YOB: 2000  MRN: [id number]     Time Calculation    Start time: 0900  Stop time: 0945 Time Calculation (min): 45 minutes         Chief Complaint: Foot Problem and Post-Op Pain    Visit #: 6    SUBJECTIVE:  Pt reports more pain with walking, limping, and having to hold onto walls more. She has had a few falls just while standing from \"forgetting how to stand since I was using the knee scooter for so long\", planning to call for CT scan on Monday, cannot squat    Mechanism of injury:  Pt with history of EDS, today is her first day in the boot from being casted. She is still NWB and using a knee scooter for another 2 weeks. She states she was told that the graft is rejecting so she is having a follow up in 2 weeks to discuss this with the surgeon. Otherwise pain is about 6/10 and managed well with meds, although sleeping for the first time without the cast last night was difficult. She reports that EDS and POTS was recently diagnosed and she consistently struggles with subluxations of both knees and multiple fingers as well as having chronic low back and coat  pain.     Pronouns: she/her     Last Surgery: Left Lateral Ligament Reconstruction, Medial Capsular Release - Left, Subtalar Fusion - Left, Ankle Arthroscopy - Left, and Cotton Osteotomy With Allograft Templating   Patient Goals:     Other patient goals:  Be able to go up stairs for self defense class once released from NWB status, learn weight training and strengthening probrem for EDS    OBJECTIVE:  Current objective measures:     Active Range of Motion   Left Ankle/Foot   Dorsiflexion (ke): 0 degrees   Dorsiflexion (kf): 1 degrees - 9deg  Plantar flexion: 28 degrees - 31deg  Inversion: 15 degrees - improved to " 20deg  Eversion: 4 degrees - improved to 9deg  Great toe extension: 39 degrees - improved to 50deg     Right Ankle/Foot   Dorsiflexion (ke): 10 degrees   Plantar flexion: 60 degrees   Inversion: 30 degrees   Eversion: 8 degrees   Great toe extension: 55 degrees      Strength:       Left Ankle/Foot   Dorsiflexion: 3+ - immproved to 4+  Plantar flexion: 3+ (painful) - same  Inversion: 3- - ipmroved to 3+ painful  Eversion: 3+ - improved to 4/5  Great toe flexion: 4 - improved to 4+  Great toe extension: 4 - improved to 4+ painful     Right Ankle/Foot   Normal strength     Additional Strength Details  Knee flexion L 4-/5, R 5/5 - L improved to 5/5  Knee extension L 4/5, R 5/5 - L improved to 4+/5          Therapeutic Exercises (CPT 40833):     1. Physiostep L3, 10min, for ankle ROM and knee/hip strengthening    5. Shuttle DL and SL, 5c leg press, 2x to fatigue ea    7. BAPS, 5min, NT    8. SLR, 2x10, NT    9. Sidelying hip abd, 2x10, NT    10. Prone hip ext, 2x10, NT    12. 4 way ankle, piTB 2x10 ea, HEP only    13. Tandem, 2x30s ea, NT    14. Semi-tandem on foam, 2x30s ea, NT    15. Feet together on foam, 2x30s, NT    16. SLB, 2x30s ea, NT    17. Standing gastroc and soleus stretch, 4x20s ea    18. Gait mechanics breakdown with fwd/back weight shifts, 10x ea    19. Ankle PROM, talocrural DF mobs, scar mobs    20. Attempt squat with heels elevated, prefers goblet squat with wide stance      Time-based treatments/modalities:    Physical Therapy Timed Treatment Charges  Therapeutic exercise minutes (CPT 06082): 45 minutes      ASSESSMENT:   Discussed pt's lack of progression and continued ankle pain, explained to pt that limited DF mobility continues to limit function with walking, squatting, and stairs biomechanically, encouraged use of boot when bothersome occasionally since she weaned out very quickly, encouraged setting up CT scan since pain has worsened      Goals:   Short Term Goals:   Improve L ankle AROM PF from  28 to 38deg - progressing, 31deg  Improve L ankle AROM DF from 0 to 5deg  - MET  Improve L big toe PROM to 50deg to improve potential walking mechanics -MET  Fill out LEFS - DNT, continue  Short term goal time span:  4-6 weeks      Long Term Goals:    Ambulate without normal gait pattern without boot - progressing  Confident in independent HEP with focus on weight training and joint stability - progressing  LEFS >50/80 to return to PLOF - DNT, continue  Long term goal time span:  2-4 months    PLAN/RECOMMENDATIONS:   Plan for treatment: therapy treatment to continue next visit.  Planned interventions for next visit: continue with current treatment.

## 2025-04-16 ENCOUNTER — PHYSICAL THERAPY (OUTPATIENT)
Dept: PHYSICAL THERAPY | Facility: REHABILITATION | Age: 25
End: 2025-04-16
Payer: MEDICAID

## 2025-04-16 DIAGNOSIS — Q79.60 EHLERS-DANLOS DISEASE: ICD-10-CM

## 2025-04-16 PROCEDURE — 97110 THERAPEUTIC EXERCISES: CPT

## 2025-04-16 PROCEDURE — 97140 MANUAL THERAPY 1/> REGIONS: CPT

## 2025-04-16 NOTE — OP THERAPY DAILY TREATMENT
Outpatient Physical Therapy  DAILY TREATMENT     [facility] Physical Therapy [address].  [address]  [place] [address]  Phone:  [phone]  Fax:  [phone]    Date: 04/16/2025    Patient: Katie Graham  YOB: 2000  MRN: [id number]     Time Calculation    Start time: 0900  Stop time: 0945 Time Calculation (min): 45 minutes         Chief Complaint: Post-Op Pain and Foot Problem    Visit #: 7    SUBJECTIVE:  Pt has CT scan and follow up with surgeon scheduled for next week. She continues to have more pain with walking especially after event this weekend. Notices she cannot put weight through the ball of her big toe when she walks or stands    Mechanism of injury:  Pt with history of EDS, today is her first day in the boot from being casted. She is still NWB and using a knee scooter for another 2 weeks. She states she was told that the graft is rejecting so she is having a follow up in 2 weeks to discuss this with the surgeon. Otherwise pain is about 6/10 and managed well with meds, although sleeping for the first time without the cast last night was difficult. She reports that EDS and POTS was recently diagnosed and she consistently struggles with subluxations of both knees and multiple fingers as well as having chronic low back and coat  pain.     Pronouns: she/her     Last Surgery: Left Lateral Ligament Reconstruction, Medial Capsular Release - Left, Subtalar Fusion - Left, Ankle Arthroscopy - Left, and Cotton Osteotomy With Allograft Templating   Patient Goals:     Other patient goals:  Be able to go up stairs for self defense class once released from NWB status, learn weight training and strengthening probrem for EDS    OBJECTIVE:  Current objective measures:     Active Range of Motion   Left Ankle/Foot   Dorsiflexion (ke): 0 degrees   Dorsiflexion (kf): 1 degrees - 9deg  Plantar flexion: 28 degrees - 31deg  Inversion: 15 degrees - improved to 20deg  Eversion: 4  degrees - improved to 9deg  Great toe extension: 39 degrees - improved to 50deg     Right Ankle/Foot   Dorsiflexion (ke): 10 degrees   Plantar flexion: 60 degrees   Inversion: 30 degrees   Eversion: 8 degrees   Great toe extension: 55 degrees      Strength:       Left Ankle/Foot   Dorsiflexion: 3+ - immproved to 4+  Plantar flexion: 3+ (painful) - same  Inversion: 3- - ipmroved to 3+ painful  Eversion: 3+ - improved to 4/5  Great toe flexion: 4 - improved to 4+  Great toe extension: 4 - improved to 4+ painful     Right Ankle/Foot   Normal strength     Additional Strength Details  Knee flexion L 4-/5, R 5/5 - L improved to 5/5  Knee extension L 4/5, R 5/5 - L improved to 4+/5          Therapeutic Exercises (CPT 43429):     1. Physiostep L3, 10min, NT    2. Seated heel raises, with and without 20# on knee, mirror for visual feedback    3. Standing heel raises, manual pressure over R 1st met head to maintain floor contact    4. Gait training with SPC, 300ft    5. Shuttle DL and SL, 5c leg press, 2x to fatigue ea    7. BAPS, 5min, NT    8. SLR, 2x10, NT    9. Sidelying hip abd, 2x10, NT    10. Prone hip ext, 2x10, NT    12. 4 way ankle, piTB 2x10 ea, HEP only    13. Tandem, 2x30s ea, NT    14. Semi-tandem on foam, 2x30s ea, NT    15. Feet together on foam, 2x30s, NT    16. SLB, 2x30s ea, NT    17. Standing gastroc and soleus stretch, 4x20s ea, NT    19. Ankle PROM, talocrural DF mobs, scar mobs      Time-based treatments/modalities:    Physical Therapy Timed Treatment Charges  Therapeutic exercise minutes (CPT 13194): 45 minutes      ASSESSMENT:   Due to worsening of pain with walking, pt has CT scheduled next week. Pt tolerated walking better with trial with SPC with improved symmetry and decreased limp, encouraged pt to obtain SPC in order to improve gait mechanics and dec pain. Demo's difficulty with 1st met head contact during ambulation and heel raises.      Goals:   Short Term Goals:   Improve L ankle AROM PF from  28 to 38deg - progressing, 31deg  Improve L ankle AROM DF from 0 to 5deg  - MET  Improve L big toe PROM to 50deg to improve potential walking mechanics -MET  Fill out LEFS - DNT, continue  Short term goal time span:  4-6 weeks      Long Term Goals:    Ambulate without normal gait pattern without boot - progressing  Confident in independent HEP with focus on weight training and joint stability - progressing  LEFS >50/80 to return to PLOF - DNT, continue  Long term goal time span:  2-4 months    PLAN/RECOMMENDATIONS:   Plan for treatment: therapy treatment to continue next visit.  Planned interventions for next visit: continue with current treatment.

## 2025-04-18 ENCOUNTER — PHYSICAL THERAPY (OUTPATIENT)
Dept: PHYSICAL THERAPY | Facility: REHABILITATION | Age: 25
End: 2025-04-18
Payer: MEDICAID

## 2025-04-18 DIAGNOSIS — Q79.60 EHLERS-DANLOS DISEASE: ICD-10-CM

## 2025-04-18 PROCEDURE — 97110 THERAPEUTIC EXERCISES: CPT

## 2025-04-18 NOTE — OP THERAPY DAILY TREATMENT
Outpatient Physical Therapy  DAILY TREATMENT     [facility] Physical Therapy [address].  [address]  [place] [address]  Phone:  [phone]  Fax:  [phone]    Date: 04/18/2025    Patient: Katie Graham  YOB: 2000  MRN: [id number]     Time Calculation    Start time: 0905  Stop time: 0945 Time Calculation (min): 40 minutes         Chief Complaint: Post-Op Pain and Foot Problem    Visit #: 8    SUBJECTIVE:  Working heel raises more at home and able to put more weight through the ball of her foot, did not sleep much last night    Mechanism of injury:  Pt with history of EDS, today is her first day in the boot from being casted. She is still NWB and using a knee scooter for another 2 weeks. She states she was told that the graft is rejecting so she is having a follow up in 2 weeks to discuss this with the surgeon. Otherwise pain is about 6/10 and managed well with meds, although sleeping for the first time without the cast last night was difficult. She reports that EDS and POTS was recently diagnosed and she consistently struggles with subluxations of both knees and multiple fingers as well as having chronic low back and coat  pain.     Pronouns: she/her     Last Surgery: Left Lateral Ligament Reconstruction, Medial Capsular Release - Left, Subtalar Fusion - Left, Ankle Arthroscopy - Left, and Cotton Osteotomy With Allograft Templating   Patient Goals:     Other patient goals:  Be able to go up stairs for self defense class once released from NWB status, learn weight training and strengthening probrem for EDS    OBJECTIVE:  Current objective measures:     Active Range of Motion   Left Ankle/Foot   Dorsiflexion (ke): 0 degrees   Dorsiflexion (kf): 1 degrees - 9deg  Plantar flexion: 28 degrees - 31deg  Inversion: 15 degrees - improved to 20deg  Eversion: 4 degrees - improved to 9deg  Great toe extension: 39 degrees - improved to 50deg     Right Ankle/Foot   Dorsiflexion  (ke): 10 degrees   Plantar flexion: 60 degrees   Inversion: 30 degrees   Eversion: 8 degrees   Great toe extension: 55 degrees      Strength:       Left Ankle/Foot   Dorsiflexion: 3+ - immproved to 4+  Plantar flexion: 3+ (painful) - same  Inversion: 3- - ipmroved to 3+ painful  Eversion: 3+ - improved to 4/5  Great toe flexion: 4 - improved to 4+  Great toe extension: 4 - improved to 4+ painful     Right Ankle/Foot   Normal strength     Additional Strength Details  Knee flexion L 4-/5, R 5/5 - L improved to 5/5  Knee extension L 4/5, R 5/5 - L improved to 4+/5          Therapeutic Exercises (CPT 16613):     1. Physiostep L3, 10min, NT    2. Seated heel raises, with and without 20# on knee, mirror for visual feedback, NT    3. Standing heel raises, to fatigue    4. Gait training with SPC, 300ft    5. Shuttle DL and SL, 5c leg press, 2x to fatigue ea    7. BAPS, 5min    8. Lunges for DF ROM, DF mobs manual by PT    9. Step downs, 4in step, forward and lateral    12. 4 way ankle, piTB 2x10 ea, HEP only    13. Tandem, 2x30s ea, NT    14. Semi-tandem on foam, 2x30s ea, NT    15. Feet together on foam, 2x30s, NT    16. SLB, 2x30s ea, NT    19. Ankle PROM, talocrural DF mobs, scar mobs      Time-based treatments/modalities:    Physical Therapy Timed Treatment Charges  Therapeutic exercise minutes (CPT 76115): 40 minutes      ASSESSMENT:   Pt provided with a SPC to take, this does appear to limit her limping and dec pain, DF wall test reveals very limited DF ROM that is likely contributing to pain with ambulation, also pt demo extreme hesitancy to DF ankle and roll over 1st met head to perform forward step downs      Goals:   Short Term Goals:   Improve L ankle AROM PF from 28 to 38deg - progressing, 31deg  Improve L ankle AROM DF from 0 to 5deg  - MET  Improve L big toe PROM to 50deg to improve potential walking mechanics -MET  Fill out LEFS - DNT, continue  Short term goal time span:  4-6 weeks      Long Term Goals:     Ambulate without normal gait pattern without boot - progressing  Confident in independent HEP with focus on weight training and joint stability - progressing  LEFS >50/80 to return to PLOF - DNT, continue  Long term goal time span:  2-4 months    PLAN/RECOMMENDATIONS:   Plan for treatment: therapy treatment to continue next visit.  Planned interventions for next visit: continue with current treatment.

## 2025-04-23 ENCOUNTER — HOSPITAL ENCOUNTER (OUTPATIENT)
Dept: RADIOLOGY | Facility: MEDICAL CENTER | Age: 25
End: 2025-04-23
Attending: ORTHOPAEDIC SURGERY
Payer: MEDICAID

## 2025-04-23 ENCOUNTER — PHYSICAL THERAPY (OUTPATIENT)
Dept: PHYSICAL THERAPY | Facility: REHABILITATION | Age: 25
End: 2025-04-23
Payer: MEDICAID

## 2025-04-23 ENCOUNTER — HOSPITAL ENCOUNTER (OUTPATIENT)
Dept: LAB | Facility: MEDICAL CENTER | Age: 25
End: 2025-04-23
Attending: ORTHOPAEDIC SURGERY
Payer: MEDICAID

## 2025-04-23 DIAGNOSIS — Z86.2 HISTORY OF AUTOIMMUNE DISEASE: ICD-10-CM

## 2025-04-23 DIAGNOSIS — R42 LIGHTHEADEDNESS: ICD-10-CM

## 2025-04-23 DIAGNOSIS — E11.9 TYPE 2 DIABETES MELLITUS WITHOUT COMPLICATION, WITHOUT LONG-TERM CURRENT USE OF INSULIN (HCC): ICD-10-CM

## 2025-04-23 DIAGNOSIS — Z79.52 CURRENT CHRONIC USE OF SYSTEMIC STEROIDS: ICD-10-CM

## 2025-04-23 DIAGNOSIS — M19.072 ARTHRITIS OF LEFT SUBTALAR JOINT: ICD-10-CM

## 2025-04-23 DIAGNOSIS — K50.80 CROHN'S DISEASE OF BOTH SMALL AND LARGE INTESTINE WITHOUT COMPLICATION (HCC): ICD-10-CM

## 2025-04-23 DIAGNOSIS — M25.372 ANKLE INSTABILITY, LEFT: ICD-10-CM

## 2025-04-23 DIAGNOSIS — Q79.60 EHLERS-DANLOS SYNDROME: ICD-10-CM

## 2025-04-23 DIAGNOSIS — Q79.60 EHLERS-DANLOS DISEASE: ICD-10-CM

## 2025-04-23 LAB
25(OH)D3 SERPL-MCNC: 18 NG/ML (ref 30–100)
EST. AVERAGE GLUCOSE BLD GHB EST-MCNC: 229 MG/DL
FERRITIN SERPL-MCNC: 117 NG/ML (ref 10–291)
HBA1C MFR BLD: 9.6 % (ref 4–5.6)
IRON SATN MFR SERPL: 15 % (ref 15–55)
IRON SERPL-MCNC: 46 UG/DL (ref 40–170)
TIBC SERPL-MCNC: 309 UG/DL (ref 250–450)
UIBC SERPL-MCNC: 263 UG/DL (ref 110–370)

## 2025-04-23 PROCEDURE — 82728 ASSAY OF FERRITIN: CPT

## 2025-04-23 PROCEDURE — 73700 CT LOWER EXTREMITY W/O DYE: CPT | Mod: LT

## 2025-04-23 PROCEDURE — 86341 ISLET CELL ANTIBODY: CPT

## 2025-04-23 PROCEDURE — 36415 COLL VENOUS BLD VENIPUNCTURE: CPT

## 2025-04-23 PROCEDURE — 83036 HEMOGLOBIN GLYCOSYLATED A1C: CPT

## 2025-04-23 PROCEDURE — 86337 INSULIN ANTIBODIES: CPT

## 2025-04-23 PROCEDURE — 97110 THERAPEUTIC EXERCISES: CPT

## 2025-04-23 PROCEDURE — 83540 ASSAY OF IRON: CPT

## 2025-04-23 PROCEDURE — 83550 IRON BINDING TEST: CPT

## 2025-04-23 PROCEDURE — 82306 VITAMIN D 25 HYDROXY: CPT

## 2025-04-23 NOTE — OP THERAPY DAILY TREATMENT
Outpatient Physical Therapy  DAILY TREATMENT     [facility] Physical Therapy [address]  [address]  [place] [address]  Phone:  [phone]  Fax:  [phone]    Date: 04/23/2025    Patient: Katie Graham  YOB: 2000  MRN: [id number]     Time Calculation    Start time: 0905  Stop time: 0945 Time Calculation (min): 40 minutes         Chief Complaint: Post-Op Pain and Foot Problem    Visit #: 9    SUBJECTIVE:  Pt reports that using the cane helped her walk around the house longer distances before getting the same amount of pain in her foot    Mechanism of injury:  Pt with history of EDS, today is her first day in the boot from being casted. She is still NWB and using a knee scooter for another 2 weeks. She states she was told that the graft is rejecting so she is having a follow up in 2 weeks to discuss this with the surgeon. Otherwise pain is about 6/10 and managed well with meds, although sleeping for the first time without the cast last night was difficult. She reports that EDS and POTS was recently diagnosed and she consistently struggles with subluxations of both knees and multiple fingers as well as having chronic low back and coat  pain.     Pronouns: she/her     Last Surgery: Left Lateral Ligament Reconstruction, Medial Capsular Release - Left, Subtalar Fusion - Left, Ankle Arthroscopy - Left, and Cotton Osteotomy With Allograft Templating   Patient Goals:     Other patient goals:  Be able to go up stairs for self defense class once released from NWB status, learn weight training and strengthening probrem for EDS    OBJECTIVE:  Current objective measures:     Active Range of Motion   Left Ankle/Foot   Dorsiflexion (ke): 0 degrees   Dorsiflexion (kf): 1 degrees - 9deg  Plantar flexion: 28 degrees - 31deg  Inversion: 15 degrees - improved to 20deg  Eversion: 4 degrees - improved to 9deg  Great toe extension: 39 degrees - improved to 50deg     Right Ankle/Foot    Dorsiflexion (ke): 10 degrees   Plantar flexion: 60 degrees   Inversion: 30 degrees   Eversion: 8 degrees   Great toe extension: 55 degrees      Strength:       Left Ankle/Foot   Dorsiflexion: 3+ - immproved to 4+  Plantar flexion: 3+ (painful) - same  Inversion: 3- - ipmroved to 3+ painful  Eversion: 3+ - improved to 4/5  Great toe flexion: 4 - improved to 4+  Great toe extension: 4 - improved to 4+ painful     Right Ankle/Foot   Normal strength     Additional Strength Details  Knee flexion L 4-/5, R 5/5 - L improved to 5/5  Knee extension L 4/5, R 5/5 - L improved to 4+/5          Therapeutic Exercises (CPT 21154):     1. Physiostep L3, 10min    2. Seated heel raises, with and without 20# on knee, mirror for visual feedback, NT    3. Standing heel raises, to fatigue, NT    5. Shuttle DL and SL, 4c leg press, 2x to fatigue ea    7. BAPS, 5min    8. Lunges for DF ROM, DF mobs manual by PT, NT    9. Step downs, 4in step, forward and lateral, NT    12. 4 way ankle, piTB 2x10 ea, HEP only    13. Tandem, 2x30s ea, NT    14. Semi-tandem on foam, 2x30s ea, NT    15. Feet together on foam, 2x30s, NT    16. SLB, 2x30s ea, NT    19. Ankle PROM, talocrural DF mobs, scar mobs      Time-based treatments/modalities:    Physical Therapy Timed Treatment Charges  Therapeutic exercise minutes (CPT 29256): 40 minutes      ASSESSMENT:   Pt with no immediate increased DF ROM following manual techniques this visit and increased soreness, although weightbearing appears to be tolerated better today with or without cane assist      Goals:   Short Term Goals:   Improve L ankle AROM PF from 28 to 38deg - progressing, 31deg  Improve L ankle AROM DF from 0 to 5deg  - MET  Improve L big toe PROM to 50deg to improve potential walking mechanics -MET  Fill out LEFS - DNT, continue  Short term goal time span:  4-6 weeks      Long Term Goals:    Ambulate without normal gait pattern without boot - progressing  Confident in independent HEP with  focus on weight training and joint stability - progressing  LEFS >50/80 to return to PLOF - DNT, continue  Long term goal time span:  2-4 months    PLAN/RECOMMENDATIONS:   Plan for treatment: therapy treatment to continue next visit.  Planned interventions for next visit: continue with current treatment.

## 2025-04-24 NOTE — OP THERAPY DAILY TREATMENT
Outpatient Physical Therapy  DAILY TREATMENT     [facility] Physical Therapy [address]  [address]  [place] [address]  Phone:  [phone]  Fax:  [phone]    Date: 04/25/2025    Patient: Katie Graham  YOB: 2000  MRN: [id number]     Time Calculation                   Chief Complaint: No chief complaint on file.    Visit #: 10    SUBJECTIVE:  Pt reports that using the cane helped her walk around the house longer distances before getting the same amount of pain in her foot    Mechanism of injury:  Pt with history of EDS, today is her first day in the boot from being casted. She is still NWB and using a knee scooter for another 2 weeks. She states she was told that the graft is rejecting so she is having a follow up in 2 weeks to discuss this with the surgeon. Otherwise pain is about 6/10 and managed well with meds, although sleeping for the first time without the cast last night was difficult. She reports that EDS and POTS was recently diagnosed and she consistently struggles with subluxations of both knees and multiple fingers as well as having chronic low back and coat  pain.     Pronouns: she/her     Last Surgery: Left Lateral Ligament Reconstruction, Medial Capsular Release - Left, Subtalar Fusion - Left, Ankle Arthroscopy - Left, and Cotton Osteotomy With Allograft Templating   Patient Goals:     Other patient goals:  Be able to go up stairs for self defense class once released from NWB status, learn weight training and strengthening probrem for EDS    OBJECTIVE:  Current objective measures:     Active Range of Motion   Left Ankle/Foot   Dorsiflexion (ke): 0 degrees   Dorsiflexion (kf): 1 degrees - 9deg  Plantar flexion: 28 degrees - 31deg  Inversion: 15 degrees - improved to 20deg  Eversion: 4 degrees - improved to 9deg  Great toe extension: 39 degrees - improved to 50deg     Right Ankle/Foot   Dorsiflexion (ke): 10 degrees   Plantar flexion: 60 degrees    Inversion: 30 degrees   Eversion: 8 degrees   Great toe extension: 55 degrees      Strength:       Left Ankle/Foot   Dorsiflexion: 3+ - immproved to 4+  Plantar flexion: 3+ (painful) - same  Inversion: 3- - ipmroved to 3+ painful  Eversion: 3+ - improved to 4/5  Great toe flexion: 4 - improved to 4+  Great toe extension: 4 - improved to 4+ painful     Right Ankle/Foot   Normal strength     Additional Strength Details  Knee flexion L 4-/5, R 5/5 - L improved to 5/5  Knee extension L 4/5, R 5/5 - L improved to 4+/5          Therapeutic Exercises (CPT 65019):     1. Physiostep L3, 10min    2. Seated heel raises, with and without 20# on knee, mirror for visual feedback, NT    3. Standing heel raises, to fatigue, NT    5. Shuttle DL and SL, 4c leg press, 2x to fatigue ea    7. BAPS, 5min    8. Lunges for DF ROM, DF mobs manual by PT, NT    9. Step downs, 4in step, forward and lateral, NT    12. 4 way ankle, piTB 2x10 ea, HEP only    13. Tandem, 2x30s ea, NT    14. Semi-tandem on foam, 2x30s ea, NT    15. Feet together on foam, 2x30s, NT    16. SLB, 2x30s ea, NT    19. Ankle PROM, talocrural DF mobs, scar mobs      Time-based treatments/modalities:           ASSESSMENT:   Pt with no immediate increased DF ROM following manual techniques this visit and increased soreness, although weightbearing appears to be tolerated better today with or without cane assist      Goals:   Short Term Goals:   Improve L ankle AROM PF from 28 to 38deg - progressing, 31deg  Improve L ankle AROM DF from 0 to 5deg  - MET  Improve L big toe PROM to 50deg to improve potential walking mechanics -MET  Fill out LEFS - DNT, continue  Short term goal time span:  4-6 weeks      Long Term Goals:    Ambulate without normal gait pattern without boot - progressing  Confident in independent HEP with focus on weight training and joint stability - progressing  LEFS >50/80 to return to PLOF - DNT, continue  Long term goal time span:  2-4  months    PLAN/RECOMMENDATIONS:   Plan for treatment: therapy treatment to continue next visit.  Planned interventions for next visit: continue with current treatment.

## 2025-04-25 ENCOUNTER — APPOINTMENT (OUTPATIENT)
Dept: PHYSICAL THERAPY | Facility: REHABILITATION | Age: 25
End: 2025-04-25
Payer: MEDICAID

## 2025-04-25 LAB — GAD65 AB SER IA-ACNC: <5 IU/ML (ref 0–5)

## 2025-04-26 LAB
INSULIN HUMAN AB SER-ACNC: <0.4 U/ML (ref 0–0.4)
PANC ISLET CELL AB TITR SER: NORMAL {TITER}
ZNT8 AB SERPL IA-ACNC: <10 U/ML (ref 0–15)

## 2025-04-27 LAB — ISLET CELL512 AB SER IA-ACNC: <5.4 U/ML (ref 0–7.4)

## 2025-04-30 ENCOUNTER — PHYSICAL THERAPY (OUTPATIENT)
Dept: PHYSICAL THERAPY | Facility: REHABILITATION | Age: 25
End: 2025-04-30
Payer: MEDICAID

## 2025-04-30 DIAGNOSIS — Q79.60 EHLERS-DANLOS DISEASE: ICD-10-CM

## 2025-04-30 PROCEDURE — 97110 THERAPEUTIC EXERCISES: CPT

## 2025-04-30 NOTE — OP THERAPY DAILY TREATMENT
Outpatient Physical Therapy  DAILY TREATMENT     [facility] [address]  [address]  [place] [address]  Phone:  [phone]  Fax:  [phone]    Date: 04/30/2025    Patient: Katie Graham  YOB: 2000  MRN: [id number]     Time Calculation    Start time: 0900  Stop time: 0945 Time Calculation (min): 45 minutes         Chief Complaint: Post-Op Pain, Difficulty Walking, and Foot Problem    Visit #: 10    SUBJECTIVE:  Pt had CT and follow up with surgeon, discussed shoe modifications, orthotics and continued PT, will discuss possibly removing hardware at next follow up if still painful. She will have trouble buying custom orthotics out of pocket due to financial difficulties    Mechanism of injury:  Pt with history of EDS, today is her first day in the boot from being casted. She is still NWB and using a knee scooter for another 2 weeks. She states she was told that the graft is rejecting so she is having a follow up in 2 weeks to discuss this with the surgeon. Otherwise pain is about 6/10 and managed well with meds, although sleeping for the first time without the cast last night was difficult. She reports that EDS and POTS was recently diagnosed and she consistently struggles with subluxations of both knees and multiple fingers as well as having chronic low back and coat  pain.     Pronouns: she/her     Last Surgery: Left Lateral Ligament Reconstruction, Medial Capsular Release - Left, Subtalar Fusion - Left, Ankle Arthroscopy - Left, and Cotton Osteotomy With Allograft Templating   Patient Goals:     Other patient goals:  Be able to go up stairs for self defense class once released from NWB status, learn weight training and strengthening probrem for EDS    OBJECTIVE:  Current objective measures:     Active Range of Motion   Left Ankle/Foot   Dorsiflexion (ke): 0 degrees   Dorsiflexion (kf): 1 degrees - 9deg  Plantar flexion: 28 degrees - 31deg  Inversion:  15 degrees - improved to 20deg  Eversion: 4 degrees - improved to 9deg  Great toe extension: 39 degrees - improved to 50deg     Right Ankle/Foot   Dorsiflexion (ke): 10 degrees   Plantar flexion: 60 degrees   Inversion: 30 degrees   Eversion: 8 degrees   Great toe extension: 55 degrees      Strength:       Left Ankle/Foot   Dorsiflexion: 3+ - immproved to 4+  Plantar flexion: 3+ (painful) - same  Inversion: 3- - ipmroved to 3+ painful  Eversion: 3+ - improved to 4/5  Great toe flexion: 4 - improved to 4+  Great toe extension: 4 - improved to 4+ painful     Right Ankle/Foot   Normal strength     Additional Strength Details  Knee flexion L 4-/5, R 5/5 - L improved to 5/5  Knee extension L 4/5, R 5/5 - L improved to 4+/5          Therapeutic Exercises (CPT 30334):     1. Physiostep L3, 10min    2. Seated heel raises, with and without 20# on knee, mirror for visual feedback, NT    3. Standing heel raises, to fatigue, NT    5. Shuttle DL and SL, 4c leg press, 2x to fatigue ea, NT    6. Squats with TRX, to fatigue    7. BAPS, 10min L4    8. Lunges for DF ROM, DF mobs manual by PT, NT    9. Step downs, 4in step, forward and lateral, NT    10. Heel slides with overpressure, 5min    12. 4 way ankle, piTB 2x10 ea, HEP only    13. Tandem, 2x30s ea, NT    14. Semi-tandem on foam, 2x30s ea, NT    15. Feet together on foam, 2x30s, NT    16. SLB, 2x30s ea, NT    19. Ankle PROM, talocrural DF mobs, scar mobs      Time-based treatments/modalities:    Physical Therapy Timed Treatment Charges  Therapeutic exercise minutes (CPT 80022): 45 minutes      ASSESSMENT:   Pt ambulating with dec limp with SPC, educated regarding edema management with 15-20mmHg compression socks and elevation, educated regarding orthotic options, DF talocrural most limited still affecting gait      Goals:   Short Term Goals:   Improve L ankle AROM PF from 28 to 38deg - progressing, 31deg  Improve L ankle AROM DF from 0 to 5deg  - MET  Improve L big toe PROM to  50deg to improve potential walking mechanics -MET  Fill out LEFS - DNT, continue  Short term goal time span:  4-6 weeks      Long Term Goals:    Ambulate without normal gait pattern without boot - progressing  Confident in independent HEP with focus on weight training and joint stability - progressing  LEFS >50/80 to return to PLOF - DNT, continue  Long term goal time span:  2-4 months    PLAN/RECOMMENDATIONS:   Plan for treatment: therapy treatment to continue next visit.  Planned interventions for next visit: continue with current treatment.

## 2025-05-15 DIAGNOSIS — E11.9 TYPE 2 DIABETES MELLITUS WITHOUT COMPLICATION, WITHOUT LONG-TERM CURRENT USE OF INSULIN (HCC): ICD-10-CM

## 2025-05-19 NOTE — ED NOTES
Pt provided with discharge teaching and information was discussed. Pt questions answered. Pt verbalized understanding of importance of follow up with health care provider. Pt verbalized importance to  prescription medication from agreed pharmacy. Pt verbalized understanding of when to return if symptoms worsen. Pt ambulated out of the ED.          Reports using cocaine once on Friday

## 2025-05-21 ENCOUNTER — TELEPHONE (OUTPATIENT)
Dept: HEALTH INFORMATION MANAGEMENT | Facility: OTHER | Age: 25
End: 2025-05-21
Payer: MEDICAID

## 2025-06-05 PROBLEM — M79.672 PAIN OF LEFT FOOT: Status: ACTIVE | Noted: 2025-06-05

## 2025-06-05 NOTE — H&P (VIEW-ONLY)
[facility]  Foot and Ankle Surgery  Return/Follow up visit note    Subjective   Patient ID:  Katie Graham is a 24 y.o. person.    Chief Complaint:    Chief Complaint   Patient presents with    Left Foot - Follow-Up    Follow-Up of the Left Foot    Last Surgery: Left Lateral Ligament Reconstruction, Medial Capsular Release - Left, Subtalar Fusion - Left, Ankle Arthroscopy - Left, and Cotton Osteotomy With Allograft Templating - Left on 1/8/2025      HPI  Katie Graham is 5 months status post above mentioned procedures. She continues to experience pain along the lateral aspect of her ankle joint. She feels as though she is walking on the outside of the foot despite orthotics and stretching exercises. She says she is unable to walk over 100 ft without significant pain and has to sit down to rest, particularly while grocery shopping. No new numbness or tingling. No fever or chills. She has had a CT scan of the left ankle back in April. No new trauma or injuries.    INDEPENDENT HISTORIAN PRESENT:   SOCIAL DETERMINANTS OF HEALTH:   REVIEW OF PRIOR EXTERNAL NOTES: External medical records were reviewed from participating outside facilities through Epic. Any additional pertinent medical history related to today's clinical visit were included and considered throughout the entirety of this encounter.   IDENTIFIED PATIENT RISK FACTORS:     ROS  Constitutional: Negative for fever, chills/sweats   Respiratory: Negative for shortness of breath, LAMBERT  Cardiovascular: Negative for chest pain or pressure  GI/: Negative for diarrhea/constipation / urinary difficulty  All other systems reviewed and are negative except as per HPI.     Objective   Ortho Exam  While standing, neutral hindfoot alignment with forefoot varus. Tenderness to palpation to the lateral ankle joint line. Incisions are well-healed with no evidence of infection, erythema, or dehiscence. Quite a bit of scarring to the medial foot  incision that is tight. Strength is adequate. No gross instability. Sensation intact. Good pulses, capillary refill.      Last Imaging Result(s):   CT scan and radiology report of the left ankle, done on 04/23/2025 at [facility], were reviewed and demonstrates solid subtalar fusion. No acute fracture or dislocation.     INDEPENDENT INTERPRETATION OF TESTS: As above (if indicated)    Assessment & Plan   Encounter Diagnoses:   5 months status post left subtalar fusion, lateral ligament reconstruction, medial capsular release, ankle scope, left cotton osteotomy with allograft.    I had a thorough discussion with the patient regarding the diagnosis including different options operative and nonoperative treatment.  Since continued nonoperative care has been unsuccessful patient requires surgical intervention, due to Katie's chronic exacerbated symptoms and since all modalities of conservative treatment have failed, in the form of a left midfoot osteotomy with possible bone graft and allograft, possible removal internal hardware, possible calcaneus osteotomy, repairs as indicated. The patient wants to proceed with operative intervention. Risks of surgery were explained which include, but not limited to, wound problems, infection, nerve injury, vascular injury, need for further surgery. Risk for worsening pain, weakness, stiffness, blood clots, chance for reinjury, malunion, nonunion, overcorrection, undercorrection, and recurrence. I discussed the risks/ benefits/ complications associated with narcotic use including the potential for addiction. All of the patient's questions were answered today. The patient understands and accepts these risks and agrees to proceed. We will schedule this in the near future at Katie's convenience. I will follow up with Katie postoperatively.     PRESCRIPTION DRUG MANAGEMENT: As above (if indicated)  DISCUSSION OF MANAGEMENT WITH EXTERNAL PROFESSIONAL: As above (if  indicated)  UNIQUE TEST ORDERED/DISCUSSED: As above (if indicated)  DECISION REGARDING MINOR/MAJOR SURGERY: As above (if indicated)    Orders Placed This Encounter    DX-OS CALCIS (HEEL) 2+ LEFT    DX-ANKLE 3+ VIEWS LEFT       Procedures   IDENTIFIED PROCEDURE RISK FACTORS:     Please note that this dictation was created using voice recognition software.  I have made every reasonable attempt to correct obvious errors but there may be errors of grammar and content that I may have overlooked prior to finalization of this note.    I, Yumiko Beasley, am scribing for, and in the presence of Nik Hurd MD.    I, Nik Hurd MD, personally performed the services described in this documentation, as scribed by, Yumiko Beasley, in my presence. I do hereby attest this information is true, accurate, and complete to the best of my knowledge.

## 2025-06-09 ENCOUNTER — HOSPITAL ENCOUNTER (OUTPATIENT)
Dept: CARDIOLOGY | Facility: MEDICAL CENTER | Age: 25
End: 2025-06-09
Payer: MEDICAID

## 2025-06-09 DIAGNOSIS — Q79.60 EHLERS-DANLOS DISEASE: ICD-10-CM

## 2025-06-09 DIAGNOSIS — Q79.60 EHLERS-DANLOS DISEASE: Primary | ICD-10-CM

## 2025-06-09 LAB
LV EJECT FRACT  99904: 57
LV EJECT FRACT MOD 2C 99903: 52.27
LV EJECT FRACT MOD 4C 99902: 63.39
LV EJECT FRACT MOD BP 99901: 56.89

## 2025-06-09 PROCEDURE — 93306 TTE W/DOPPLER COMPLETE: CPT | Mod: 26 | Performed by: INTERNAL MEDICINE

## 2025-06-09 PROCEDURE — 93306 TTE W/DOPPLER COMPLETE: CPT

## 2025-06-10 ENCOUNTER — OFFICE VISIT (OUTPATIENT)
Dept: INTERNAL MEDICINE | Facility: OTHER | Age: 25
End: 2025-06-10
Payer: MEDICAID

## 2025-06-10 VITALS
HEART RATE: 92 BPM | TEMPERATURE: 98.2 F | SYSTOLIC BLOOD PRESSURE: 109 MMHG | DIASTOLIC BLOOD PRESSURE: 76 MMHG | OXYGEN SATURATION: 95 % | WEIGHT: 293 LBS | BODY MASS INDEX: 48.82 KG/M2 | HEIGHT: 65 IN

## 2025-06-10 DIAGNOSIS — F33.1 MODERATE EPISODE OF RECURRENT MAJOR DEPRESSIVE DISORDER (HCC): ICD-10-CM

## 2025-06-10 DIAGNOSIS — N92.6 IRREGULAR MENSES: ICD-10-CM

## 2025-06-10 DIAGNOSIS — E66.813 CLASS 3 SEVERE OBESITY DUE TO EXCESS CALORIES WITH SERIOUS COMORBIDITY AND BODY MASS INDEX (BMI) OF 45.0 TO 49.9 IN ADULT: ICD-10-CM

## 2025-06-10 DIAGNOSIS — K21.9 GASTROESOPHAGEAL REFLUX DISEASE WITHOUT ESOPHAGITIS: ICD-10-CM

## 2025-06-10 DIAGNOSIS — M19.072 ARTHRITIS OF LEFT SUBTALAR JOINT: ICD-10-CM

## 2025-06-10 DIAGNOSIS — Q66.02 CONGENITAL TALIPES EQUINOVARUS DEFORMITY OF LEFT FOOT: ICD-10-CM

## 2025-06-10 DIAGNOSIS — R06.02 SHORTNESS OF BREATH: ICD-10-CM

## 2025-06-10 DIAGNOSIS — F41.1 GENERALIZED ANXIETY DISORDER: ICD-10-CM

## 2025-06-10 DIAGNOSIS — E11.9 TYPE 2 DIABETES MELLITUS WITHOUT COMPLICATION, WITHOUT LONG-TERM CURRENT USE OF INSULIN (HCC): Primary | ICD-10-CM

## 2025-06-10 DIAGNOSIS — Q79.60 EHLERS-DANLOS DISEASE: ICD-10-CM

## 2025-06-10 DIAGNOSIS — Z88.9 HISTORY OF SEVERE ALLERGIC REACTION: ICD-10-CM

## 2025-06-10 DIAGNOSIS — G47.00 INSOMNIA, UNSPECIFIED TYPE: ICD-10-CM

## 2025-06-10 PROBLEM — M79.672 PAIN OF LEFT FOOT: Status: RESOLVED | Noted: 2025-06-05 | Resolved: 2025-06-10

## 2025-06-10 PROBLEM — B18.2 CARRIER OF VIRAL HEPATITIS C (HCC): Status: ACTIVE | Noted: 2025-06-10

## 2025-06-10 PROBLEM — D50.9 IRON DEFICIENCY ANEMIA: Status: RESOLVED | Noted: 2024-06-07 | Resolved: 2025-06-10

## 2025-06-10 PROBLEM — E87.20 METABOLIC ACIDOSIS: Status: RESOLVED | Noted: 2024-04-24 | Resolved: 2025-06-10

## 2025-06-10 PROCEDURE — 3074F SYST BP LT 130 MM HG: CPT | Mod: GC

## 2025-06-10 PROCEDURE — 99214 OFFICE O/P EST MOD 30 MIN: CPT | Mod: GC

## 2025-06-10 PROCEDURE — 3078F DIAST BP <80 MM HG: CPT | Mod: GC

## 2025-06-10 RX ORDER — SEMAGLUTIDE 0.68 MG/ML
0.25 INJECTION, SOLUTION SUBCUTANEOUS
Qty: 12 ML | Refills: 3 | Status: SHIPPED | OUTPATIENT
Start: 2025-06-10

## 2025-06-10 RX ORDER — AVOBENZONE, HOMOSALATE, OCTISALATE, OCTOCRYLENE 30; 40; 45; 26 MG/ML; MG/ML; MG/ML; MG/ML
CREAM TOPICAL
Qty: 100 EACH | Refills: 0 | Status: SHIPPED | OUTPATIENT
Start: 2025-06-10

## 2025-06-10 RX ORDER — EPINEPHRINE 0.3 MG/.3ML
INJECTION SUBCUTANEOUS
Qty: 1 EACH | Refills: 1 | Status: SHIPPED | OUTPATIENT
Start: 2025-06-10

## 2025-06-10 RX ORDER — FLUOXETINE 10 MG/1
10 CAPSULE ORAL DAILY
Qty: 60 CAPSULE | Refills: 1 | Status: SHIPPED | OUTPATIENT
Start: 2025-06-10

## 2025-06-10 RX ORDER — ACETAMINOPHEN AND CODEINE PHOSPHATE 120; 12 MG/5ML; MG/5ML
SOLUTION ORAL
Qty: 84 TABLET | Refills: 1 | Status: SHIPPED | OUTPATIENT
Start: 2025-06-10 | End: 2025-06-13

## 2025-06-10 RX ORDER — HYDROXYZINE PAMOATE 50 MG/1
50 CAPSULE ORAL 3 TIMES DAILY PRN
Qty: 30 CAPSULE | Refills: 1 | Status: SHIPPED | OUTPATIENT
Start: 2025-06-10

## 2025-06-10 RX ORDER — DOXEPIN HYDROCHLORIDE 25 MG/1
25 CAPSULE ORAL NIGHTLY
Qty: 60 CAPSULE | Refills: 1 | Status: SHIPPED | OUTPATIENT
Start: 2025-06-10

## 2025-06-10 RX ORDER — ALBUTEROL SULFATE 90 UG/1
2 INHALANT RESPIRATORY (INHALATION) EVERY 6 HOURS PRN
Qty: 8.5 G | Refills: 1 | Status: SHIPPED | OUTPATIENT
Start: 2025-06-10

## 2025-06-10 RX ORDER — METFORMIN HYDROCHLORIDE 500 MG/1
1000 TABLET, EXTENDED RELEASE ORAL DAILY
Qty: 180 TABLET | Refills: 1 | Status: SHIPPED | OUTPATIENT
Start: 2025-06-10

## 2025-06-10 RX ORDER — ONDANSETRON 4 MG/1
4 TABLET, ORALLY DISINTEGRATING ORAL EVERY 8 HOURS PRN
Qty: 30 TABLET | Refills: 0 | Status: SHIPPED | OUTPATIENT
Start: 2025-06-10

## 2025-06-10 RX ORDER — GLUCOSAMINE HCL/CHONDROITIN SU 500-400 MG
CAPSULE ORAL
Qty: 100 EACH | Refills: 0 | Status: SHIPPED | OUTPATIENT
Start: 2025-06-10

## 2025-06-10 ASSESSMENT — ENCOUNTER SYMPTOMS
HEARTBURN: 0
FEVER: 0
NECK PAIN: 0
CHILLS: 0
ORTHOPNEA: 0
BACK PAIN: 0
DEPRESSION: 1
WEIGHT LOSS: 0
CONSTIPATION: 0
COUGH: 0
SHORTNESS OF BREATH: 0
VOMITING: 0
ABDOMINAL PAIN: 0
NERVOUS/ANXIOUS: 1
DIARRHEA: 0
PALPITATIONS: 0
MYALGIAS: 0
NAUSEA: 0
SPUTUM PRODUCTION: 0
CLAUDICATION: 0

## 2025-06-10 ASSESSMENT — FIBROSIS 4 INDEX: FIB4 SCORE: 0.19

## 2025-06-10 NOTE — PROGRESS NOTES
Follow Up      Chief Complaint: Out of Meds and Diabetes     Last Seen: 11/18/24 by Dr. Aguero     History of Present Illness:   Katie Graham is a 25 y.o. 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 maternal exposure to Hep C, and avoidant-restrictive food intake disorder, and T2DM  who presents with     The patient underwent a substantial ankle surgery in January, including subtalar fusion and ligament repair. Post-surgery, there were complications with medication delivery and costs, leading to the cessation of all medications, patient is also scheduled to get a revision surgery next month.The patient reports difficulty in restarting medications and now has a recent hemoglobin A1c of 9.3, indicating diabetes. The patient was previously managed on metformin but seeks to avoid insulin, preferring to restart metformin and consider a GLP-1 receptor agonist like Ozempic. In the review of systems, the patient reports no new foot wounds or complications but mentions persistent nerve pain in the foot post-surgery.    The patient also describes experiencing significant nausea and prefers Zofran (oral suspension) for management. They report poorly controlled Crohn's disease, with unmanaged symptoms due to halted infusions. Additionally,  the current Protonix is ineffective for heartburn and patient does not want to continue on this.The patient's review of systems notes unmanaged Crohn's symptoms but no severe exacerbations currently.    The patient expresses heightened anxiety and anger issues and has a history of using hydroxyzine, which was not favored. They have a prior positive experience with Prozac for anxiety and depression and will restart this medication at a low dose. Sleep issues are managed with doxepin, and the patient requires a refill. Review of systems indicates  ongoing anxiety and sleep disturbances, but no new psychological symptoms. Denies SI/HI    The patient is dealing with a new bug bite on the leg, which appeared two days ago and has started draining yellow pus. It's slightly itchy but not warm or fluctuant. They are advised to monitor for signs of infection such as increased warmth, swelling, or fever.      Review of Systems:  Review of Systems   Constitutional:  Negative for chills, fever, malaise/fatigue and weight loss.   Respiratory:  Negative for cough, sputum production and shortness of breath.    Cardiovascular:  Negative for chest pain, palpitations, orthopnea and claudication.   Gastrointestinal:  Negative for abdominal pain, constipation, diarrhea, heartburn, nausea and vomiting.   Genitourinary:  Negative for dysuria, frequency and urgency.   Musculoskeletal:  Positive for joint pain. Negative for back pain, myalgias and neck pain.   Skin:  Positive for rash. Negative for itching.   Psychiatric/Behavioral:  Positive for depression. Negative for suicidal ideas. The patient is nervous/anxious.         Past Medical History:   Past Medical History[1]    Patient Active Problem List    Diagnosis Date Noted   • Pain of left foot 06/05/2025   • Ankle instability, left 11/19/2024   • Nancy-Danlos disease 11/18/2024   • Crohn's disease (HCC) 08/23/2024   • Iron deficiency anemia 06/07/2024   • Housing situation unstable 04/24/2024   • Chronic nausea 04/24/2024   • Avoidant-restrictive food intake disorder (ARFID) 04/24/2024   • Metabolic acidosis 04/24/2024   • Sensorineural hearing loss (SNHL) of both ears 04/24/2024   • Tobacco dependence 10/24/2023   • History of migraine 10/04/2023   • Family history of breast cancer 10/04/2023   • Type 2 diabetes mellitus without complication, without long-term current use of insulin (formerly [facility]) 10/04/2023   • Crohn's disease of both small and large intestine without complication (formerly [facility]) 09/27/2023   • Sleep apnea 12/08/2021   •  Irregular menses 11/05/2021   • Borderline personality disorder (HCC) 09/25/2021   • History of suicide attempt 09/24/2021   • Gastroesophageal reflux disease without esophagitis 11/04/2020   • Chronic pain of left ankle 11/04/2020   • Major depressive disorder with psychotic features ([facility]) 11/04/2020   • 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 11/04/2020   • Class 3 severe obesity in adult 11/04/2020       Past Surgical History:   Past Surgical History[2]     Allergies:  Coconut (cocos nucifera) allergy skin test, Apple, Gluten meal, Lactose, Latex, [place], Sulfa drugs, and Tomato    Medications:     Current Outpatient Medications:   •  albuterol, 2 Puff, Inhalation, Q6HRS PRN, Taking As Needed  •  EPINEPHrine, Inject 0.3 mL into the thigh one time for 1 dose, Taking  •  gabapentin, Take 1 po qhs  •  gabapentin, 300 mg, Oral, QHS  •  gabapentin, Take 1 po qhs  •  metFORMIN ER, 1,000 mg, Oral, DAILY  •  hydrOXYzine pamoate, 50 mg, Oral, TID PRN (Patient not taking: Reported on 6/10/2025), Not Taking  •  gabapentin, Take 1 po qhs (Patient taking differently: 300 mg, Oral, EVERY BEDTIME, POST OP)  •  nicotine, 1 Patch, Transdermal, Q24HRS (Patient not taking: Reported on 6/10/2025), Not Taking  •  doxepin, 25 mg, Oral, Nightly (Patient not taking: Reported on 6/10/2025), Not Taking  •  norethindrone, Please take one pill every day (within the same 3 hour time frame) for 12 weeks total and then the next week do not take this medication (this will induce period) (Patient not taking: Reported on 6/10/2025), Not Taking  •  pantoprazole, take 1 tablet by mouth twice a day 30 minutes  before breakfast meal and dinner meal (Patient not taking: Reported on 6/10/2025), Not Taking  •  promethazine, take 1 tablet by mouth three times a day as needed (Patient not taking: Reported on 6/10/2025), Not Taking  •  ondansetron, 4 mg, Oral, Q8HRS PRN (Patient not taking: Reported on 6/10/2025), Not Taking  •  omeprazole,  "20 mg, Oral, DAILY (Patient not taking: Reported on 6/10/2025), Not Taking     Social History:   Social History[3]    Family History:   Family History   Problem Relation Age of Onset   • Cancer Mother 54        bone cancer with mets   • Diabetes Mother 30        Type 2   • Alcohol abuse Mother         sober 18 months   • Hypertension Father    • Alcohol abuse Father    • Kidney Disease Father    • Diabetes Sister         type 2   • Genetic Disorder Sister         club foot   • No Known Problems Sister    • No Known Problems Brother    • Lung Disease Maternal Aunt    • Alcohol abuse Maternal Aunt    • Cancer Maternal Grandmother         Lung   • Alcohol abuse Maternal Grandmother    • Diabetes Maternal Grandfather         type 2   • Heart Disease Maternal Grandfather    • Hypertension Maternal Grandfather    • Hyperlipidemia Maternal Grandfather    • Alcohol abuse Maternal Grandfather    • Heart Disease Paternal Grandmother    • Hypertension Paternal Grandmother    • Hyperlipidemia Paternal Grandmother    • Alcohol abuse Paternal Grandfather    • Cancer Maternal great-grandmother         breast       Objective:  Vitals:   /76 (BP Location: Other (Comment), Patient Position: Sitting, BP Cuff Size: Large adult)   Pulse 92   Temp 36.8 °C (98.2 °F) (Temporal)   Ht 1.651 m (5' 5\")   Wt (!) 135 kg (297 lb)   SpO2 95%  Body mass index is 49.42 kg/m².    Physical Exam:   Physical Exam  Vitals reviewed.   Constitutional:       Appearance: Normal appearance.   HENT:      Head: Normocephalic and atraumatic.      Nose: Nose normal.      Mouth/Throat:      Mouth: Mucous membranes are moist.      Pharynx: Oropharynx is clear.   Eyes:      Extraocular Movements: Extraocular movements intact.      Conjunctiva/sclera: Conjunctivae normal.      Pupils: Pupils are equal, round, and reactive to light.   Cardiovascular:      Rate and Rhythm: Normal rate and regular rhythm.      Pulses: Normal pulses.      Heart sounds: Normal " heart sounds. No murmur heard.  Pulmonary:      Effort: Pulmonary effort is normal.      Breath sounds: Normal breath sounds. No wheezing, rhonchi or rales.   Abdominal:      General: Abdomen is flat. Bowel sounds are normal. There is no distension.      Palpations: Abdomen is soft. There is no mass.      Tenderness: There is no abdominal tenderness.   Musculoskeletal:      Right hand: Deformity present.      Right lower leg: No edema.      Left lower leg: No edema.      Right ankle: Deformity present. Decreased range of motion.      Comments: Left ring finger curls inward when hand extended. Slowed gait due to surgical repair of foot deformity.   Skin:     General: Skin is warm and dry.      Capillary Refill: Capillary refill takes less than 2 seconds.      Findings: No lesion or rash.   Neurological:      General: No focal deficit present.      Mental Status: Katie is alert and oriented to person, place, and time. Mental status is at baseline.      Cranial Nerves: No cranial nerve deficit.      Motor: No weakness.        Results:  Labs and imaging relevant to this visit were reviewed.     Assessment and Plan:    25 y.o. person with:     1. Type 2 diabetes mellitus without complication, without long-term current use of insulin (HCC) (Primary)  2. Class 3 severe obesity due to excess calories with serious comorbidity and body mass index (BMI) of 45.0 to 49.9 in adult  Patient with recent worsening of type 2 diabetes with the most recent A1c of 9.4 approximately 2-1/2 months ago.  This was likely in the setting of running out of their medication refills and in turn not having significant compliance.  Patient would like to restart on metformin and attempt a GLP-1 agonist which I felt was also reasonable  - Discussed dietary modifications in regards to diabetes which patient is adhering to is cutting out sugary drinks is decreasing the amount of snacks and is making significant lifestyle changes in effort to improve  this  - Semaglutide,0.25 or 0.5MG/DOS, (OZEMPIC, 0.25 OR 0.5 MG/DOSE,) 2 MG/3ML Solution Pen-injector; Inject 0.25 mg under the skin every 7 days.  Dispense: 12 mL; Refill: 3  - Lipid Profile; Future  - Microalbumin Creat Ratio Urine (Lab Collect); Future  - Diabetic Monofilament LE Exam  - Blood Glucose Meter Kit; Test blood sugar as recommended by provider. Per insurance preference blood glucose monitoring kit.  Dispense: 1 Kit; Refill: 0  - Blood Glucose Test Strips; Use one per insurance preference strip to test blood sugar once daily early morning before first meal.  Dispense: 100 Strip; Refill: 0  - Lancets; Use one per insurance preference lancet to test blood sugar once daily early morning before first meal.  Dispense: 100 Each; Refill: 0  - Alcohol Swabs; Wipe site with prep pad prior to injection.  Dispense: 100 Each; Refill: 0  - HEMOGLOBIN A1C; Future    3. Nancy-Danlos disease  Previously diagnosed with Nancy-Danlos in clinic and needs to follow-up with rheumatology.  Does not have any significant vision or cardiac sequelae as a result of this most recent echocardiogram few days ago was unremarkable patient would like a new referral to rheumatology in [place] [facility] they were told they accept their insurance  - Referral to Rheumatology    4. 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 Arthritis of left subtalar joint  Patient with recent surgical repair of equinovarus deformity of the left foot and pending a surgical revision next month has significant pain as result of this nerve pain as well as surgically related pain would like options for pain control  - Referral to Pain Management    6. Moderate episode of recurrent major depressive disorder (HCC)  7. Generalized anxiety disorder  Patient with previous history of MDD as well as JANICE.  Was not on medications for an extended period but did feel that Prozac was beneficial in the past and would like to  restart this medication, they say that hydroxyzine is occasionally beneficial but would like something more on top of this  - FLUoxetine (PROZAC) 10 MG Cap; Take 1 Capsule by mouth every day.  Dispense: 60 Capsule; Refill: 1  - hydrOXYzine pamoate (VISTARIL) 50 MG Cap; Take 1 Capsule by mouth 3 times a day as needed for Itching or Other (nausea, anxiety, insomnia).  Dispense: 30 Capsule; Refill: 1  -Offered behavioral health referral but patient would like to defer at this time    8. Shortness of breath  Patient does not have a noted history of asthma in the chart, this was previously prescribed for shortness of breath as far as I can tell per chart review  - Potentially may need PFTs in the future  - albuterol 108 (90 Base) MCG/ACT Aero Soln inhalation aerosol; Inhale 2 Puffs every 6 hours as needed for Shortness of Breath.  Dispense: 8.5 g; Refill: 1    9. Irregular menses  - norethindrone (MICRONOR) 0.35 MG tablet; Please take one pill every day (within the same 3 hour time frame) for 12 weeks total and then the next week do not take this medication (this will induce period)  Dispense: 84 Tablet; Refill: 1    10. Insomnia, unspecified type  - doxepin (SINEQUAN) 25 MG Cap; Take 1 Capsule by mouth every evening.  Dispense: 60 Capsule; Refill: 1    11. Gastroesophageal reflux disease without esophagitis  - ondansetron (ZOFRAN ODT) 4 MG TABLET DISPERSIBLE; Dissolve 1 Tablet by mouth every 8 hours as needed for Nausea/Vomiting.  Dispense: 30 Tablet; Refill: 0    12. History of severe allergic reaction  - EPINEPHrine (EPIPEN) 0.3 MG/0.3ML Solution Auto-injector solution for injection; Inject 0.3 mL into the thigh one time for 1 dose  Dispense: 1 Each; Refill: 1     No follow-ups on file.    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[facility])    • Migraine    • POTS (postural orthostatic tachycardia syndrome)    • Seizure ([facility])     FROM SEVERE STRESS - LAST 2018   • Substance abuse ([facility])     xanax and alcohol in highschool   • Suicidal ideation 09/24/2021   • Type 2 diabetes, diet controlled (HCC) 10/04/2023    History of A1c's up to 8.3  Patient checks home BG twice daily  Controls with diet  Has not had A1c in over a year      • UGIB (upper gastrointestinal bleed) 10/23/2023   • Urinary bladder disorder 12/23/2024    FREQ UTI'S   [2]  Past Surgical History:  Procedure Laterality Date   • ANKLE LIGAMENT RECONSTRUCTION Left 1/8/2025    Procedure: LEFT LATERAL LIGAMENT RECONSTRUCTION, MEDIAL CAPSULAR RELEASE;  Surgeon: Nik Hurd M.D.;  Location: [facility];  Service: Orthopedics   • FUSION AND ARTHROEREISIS, JOINT, FOOT AND ANKLE Left 1/8/2025    Procedure: SUBTALAR FUSION;  Surgeon: Nik Hurd M.D.;  Location: [facility];  Service: Orthopedics   • ANKLE ARTHROSCOPY Left 1/8/2025    Procedure: ANKLE ARTHROSCOPY;  Surgeon: Nik Hurd M.D.;  Location: [facility];  Service: Orthopedics   • ORTHOPEDIC OSTEOTOMY Left 1/8/2025    Procedure: COTTON OSTEOTOMY WITH ALLOGRAFT TEMPLATING;  Surgeon: Nik Hurd M.D.;  Location: [facility];  Service: Orthopedics   • UT UPPER GI ENDOSCOPY,DIAGNOSIS N/A 10/24/2023    Procedure: GASTROSCOPY;  Surgeon: Jamison Jones M.D.;  Location: SURGERY SAME DAY [facility];  Service: Gastroenterology   • UT UPPER GI ENDOSCOPY,BIOPSY N/A 10/24/2023    Procedure: GASTROSCOPY, WITH BIOPSY;  Surgeon: Jamison Jones M.D.;  Location: SURGERY SAME DAY [facility];  Service: Gastroenterology   • UT COLONOSCOPY-FLEXIBLE N/A 9/28/2023    Procedure: COLONOSCOPY;  Surgeon:  Jamison Jones M.D.;  Location: SURGERY SAME DAY [facility];  Service: Gastroenterology   • RI COLONOSCOPY,BIOPSY N/A 9/28/2023    Procedure: COLONOSCOPY, WITH BIOPSY;  Surgeon: Jamison Jones M.D.;  Location: SURGERY SAME DAY [facility];  Service: Gastroenterology   • LAPAROSCOPIC UMBILICAL HERNIA REPAIR  2002   [3]  Social History  Tobacco Use   • Smoking status: Former     Current packs/day: 0.50     Average packs/day: 0.5 packs/day for 1.9 years (0.8 ttl pk-yrs)     Types: Cigarettes     Start date: 8/14/2018     Quit date: 11/4/2018   • Smokeless tobacco: Never   • Tobacco comments:     Quit after 3 months   Vaping Use   • Vaping status: Every Day   • Substances: Nicotine, Flavoring, 0 anali    • Devices: Disposable   Substance Use Topics   • Alcohol use: Not Currently     Comment: occasionally   • Drug use: Not Currently     Types: Marijuana, Inhaled     Comment: past hx of using multiple drugs in high school

## 2025-06-11 ENCOUNTER — PHYSICAL THERAPY (OUTPATIENT)
Dept: PHYSICAL THERAPY | Facility: REHABILITATION | Age: 25
End: 2025-06-11
Payer: MEDICAID

## 2025-06-11 ENCOUNTER — TELEPHONE (OUTPATIENT)
Dept: INTERNAL MEDICINE | Facility: OTHER | Age: 25
End: 2025-06-11

## 2025-06-11 DIAGNOSIS — E66.813 CLASS 3 SEVERE OBESITY DUE TO EXCESS CALORIES WITH SERIOUS COMORBIDITY AND BODY MASS INDEX (BMI) OF 45.0 TO 49.9 IN ADULT: ICD-10-CM

## 2025-06-11 DIAGNOSIS — Q79.60 EHLERS-DANLOS DISEASE: Primary | ICD-10-CM

## 2025-06-11 DIAGNOSIS — E11.9 TYPE 2 DIABETES MELLITUS WITHOUT COMPLICATION, WITHOUT LONG-TERM CURRENT USE OF INSULIN (HCC): ICD-10-CM

## 2025-06-11 PROCEDURE — 97162 PT EVAL MOD COMPLEX 30 MIN: CPT

## 2025-06-11 PROCEDURE — 999999 HB NO CHARGE

## 2025-06-11 NOTE — TELEPHONE ENCOUNTER
I received a request to fill out a PA for the Ozempic.     First, Joanne Wilkinson can you change the script?  Write now it is written as a year supply with 3 refills.   Please change the dispense quantity.       Ozempic 0.25 mg or 0.5 mg/dose comes in a carton with one 3 mL pen. Quantity is typically:  3 mL per 28 days (Note: while initiation is often 42 days, some plans may only cover a shorter period. Please check your patient’s specific coverage to confirm)  9 mL per 84 days      Thank you.

## 2025-06-11 NOTE — OP THERAPY EVALUATION
Outpatient Physical Therapy  INITIAL EVALUATION    [facility] [address]  [address]  [facility] [address]  Phone:  [phone]  Fax:  [phone]    Date of Evaluation: 2025    Patient: Katie Graham  YOB: 2000  MRN: [id number]     Referring Provider: Leslie Aguero M.D.  [address] [place] [address]   Referring Diagnosis Nanyc-Danlos disease [Q79.60]     Time Calculation  Start time: 1040  Stop time: 1120 Time Calculation (min): 40 minutes         Chief Complaint: Back Problem, Hip Problem, and Foot Problem    Visit Diagnoses     ICD-10-CM   1. Nancy-Danlos disease  Q79.60       Date of onset of impairment: 2025    Subjective:   History of Present Illness:     Mechanism of injury:  Pt with history of EDS complains of chronic back pain and multiple dislocations of various joints with various activities. She had a L subtalar fusion and ligament repair in 2025 but continues to have significant pain in her ankle and now her graft site with walking and therefore is having a revision in the next few months. She currently has pain with walking >100ft using a SPC and continues to have chronic back, B hip, and B knee pain due to EDS.  Pain:     Current pain ratin    Location:  Low back, B hips, B knees, and L ankle  Patient Goals:     Patient goals for therapy:  Decreased pain and increased strength      Past Medical History[1]  Past Surgical History[2]  Social History     Tobacco Use    Smoking status: Former     Current packs/day: 0.50     Average packs/day: 0.5 packs/day for 1.9 years (0.8 ttl pk-yrs)     Types: Cigarettes     Start date: 2018     Quit date: 2018    Smokeless tobacco: Never    Tobacco comments:     Quit after 3 months   Substance Use Topics    Alcohol use: Not Currently     Comment: occasionally     Social Hx - Family and Occupational[3]    Objective     Palpation   Left   Tenderness of the lumbar  paraspinals.     Right   Tenderness of the lumbar paraspinals.     Joint Play     Additional hip joint play details: Lumbar PA mobs all hypermobile, tender L2-4       Strength:      Left Hip   Planes of Motion   Flexion: 4  Extension: 4+  Abduction: 4-    Right Hip   Planes of Motion   Flexion: 4  Extension: 4+  Abduction: 4-    Left Knee   Flexion: 5  Extension: 4+    Right Knee   Flexion: 5  Extension: 5    Left Ankle/Foot   Dorsiflexion: 4 (painful)  Plantar flexion: 4    Right Ankle/Foot   Dorsiflexion: 5  Plantar flexion: 4+    General Comments     Spine Comments   Bridge endurance test: 1:09        Therapeutic Exercises (CPT 06643):     1. Bridge holds    2. Superman    3. Plank on knees      Time-based treatments/modalities:           Assessment, Response and Plan:   Impairments: impaired physical strength, lacks appropriate home exercise program, pain with function and weight-bearing intolerance    Assessment details:  Patient is a 24yo female who presents to PT 5mo s/p ankle surgery with continued pain with walking as well as chronic low back and B LE from history of EDS. Findings include decreased L ankle strength and ROM, decreased hip strength, moderately poor posterior chain endurance. These deficits affect walking tolerance as well as participation with IADLs. Pt will benefit from skilled therapeutic interventions at this time in order to address functional limitations and help reach their functional goals.     Barriers to therapy:  Transportation and comorbidities  Prognosis: fair    Goals:   Short Term Goals:   Pt will improve bridge endurance test from 1:09 to 2min to improve posterior chain endurance  Pt will be compliant with HEP 3x/wk to improve self efficacy  Short term goal time span:  4-6 weeks      Long Term Goals:    Pt will improve bridge endurance test to 3min to improve posterior chain endurance  Pt will improve L ankle DF and PF strength from 4 to 4+/5  Long term goal time span:  1-2  months    Plan:   Therapy options:  Physical therapy treatment to continue  Planned therapy interventions:  Therapeutic Exercise (CPT 77081)  Other planned therapy interventions:  97110 x4  Frequency:  3x week  Duration in weeks:  10  Discussed with:  Patient      Referring provider co-signature:  I have reviewed this plan of care and my co-signature certifies the need for services.    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[facility])     Migraine     POTS (postural orthostatic tachycardia syndrome)     Seizure ([facility])     FROM SEVERE STRESS - LAST 2018    Substance abuse ([facility])     xanax and alcohol in highschool    Suicidal ideation 09/24/2021    Type 2 diabetes, diet controlled ([facility]) 10/04/2023    History of A1c's up to 8.3  Patient checks home BG twice daily  Controls with diet  Has not had A1c in over a year       UGIB (upper gastrointestinal bleed) 10/23/2023    Urinary bladder disorder 12/23/2024    FREQ UTI'S   [2]   Past Surgical History:  Procedure Laterality Date    ANKLE LIGAMENT RECONSTRUCTION Left 1/8/2025    Procedure: LEFT LATERAL LIGAMENT RECONSTRUCTION, MEDIAL CAPSULAR RELEASE;  Surgeon: Nik Hurd M.D.;  Location: [facility];  Service: Orthopedics    FUSION AND ARTHROEREISIS, JOINT, FOOT AND ANKLE Left 1/8/2025    Procedure: SUBTALAR FUSION;  Surgeon: Nik Hurd M.D.;  Location: [facility];  Service: Orthopedics    ANKLE ARTHROSCOPY Left 1/8/2025    Procedure: ANKLE ARTHROSCOPY;  Surgeon: Nik Hurd M.D.;  Location: [facility];   Service: Orthopedics    ORTHOPEDIC OSTEOTOMY Left 1/8/2025    Procedure: COTTON OSTEOTOMY WITH ALLOGRAFT TEMPLATING;  Surgeon: Nik Hurd M.D.;  Location: SURGERY [facility];  Service: Orthopedics    MS UPPER GI ENDOSCOPY,DIAGNOSIS N/A 10/24/2023    Procedure: GASTROSCOPY;  Surgeon: Jamison Jones M.D.;  Location: SURGERY SAME DAY [facility];  Service: Gastroenterology    MS UPPER GI ENDOSCOPY,BIOPSY N/A 10/24/2023    Procedure: GASTROSCOPY, WITH BIOPSY;  Surgeon: Jamison Jones M.D.;  Location: SURGERY SAME DAY [facility];  Service: Gastroenterology    MS COLONOSCOPY-FLEXIBLE N/A 9/28/2023    Procedure: COLONOSCOPY;  Surgeon: Jamison Jones M.D.;  Location: SURGERY SAME DAY [facility];  Service: Gastroenterology    MS COLONOSCOPY,BIOPSY N/A 9/28/2023    Procedure: COLONOSCOPY, WITH BIOPSY;  Surgeon: Jamison Jones M.D.;  Location: SURGERY SAME DAY [facility];  Service: Gastroenterology    LAPAROSCOPIC UMBILICAL HERNIA REPAIR  2002   [3]   Family and Occupational History  Socioeconomic History    Marital status: Single     Spouse name: Not on file    Number of children: 0    Years of education: Not on file    Highest education level: 12th grade   Occupational History    Occupation: unemployed

## 2025-06-12 PROBLEM — M79.672 PAIN OF LEFT FOOT: Status: ACTIVE | Noted: 2025-06-05

## 2025-06-12 ASSESSMENT — ENCOUNTER SYMPTOMS: PAIN SCALE: 5

## 2025-06-13 RX ORDER — ERGOCALCIFEROL 1.25 MG/1
50000 CAPSULE, LIQUID FILLED ORAL
COMMUNITY

## 2025-06-13 RX ORDER — SEMAGLUTIDE 0.68 MG/ML
0.25 INJECTION, SOLUTION SUBCUTANEOUS
Qty: 9 ML | Refills: 1 | Status: ON HOLD | OUTPATIENT
Start: 2025-06-13 | End: 2025-06-30

## 2025-06-13 ASSESSMENT — FIBROSIS 4 INDEX: FIB4 SCORE: 0.19

## 2025-06-13 NOTE — TELEPHONE ENCOUNTER
Tried to submit PAR but error says that it is altered started elsewhere. This may have been initiated by Leslie. I will wait for her to see this on Monday.

## 2025-06-13 NOTE — Clinical Note
REFERRAL APPROVAL NOTICE         Sent on June 13, 2025                   Katie Graham  [address]  [place] [address]                   Dear   Santos,    After a careful review of the medical information and benefit coverage, [facility] has processed your referral. See below for additional details.    If applicable, you must be actively enrolled with your insurance for coverage of the authorized service. If you have any questions regarding your coverage, please contact your insurance directly.    REFERRAL INFORMATION   Referral #:  [id number]  Referred-To Provider    Referred-By Provider:  Phys Med [facility] Rehab    Bernardo Rubio M.D.   [facility]      [address] [facility] [address]  [phone] [address]  [facility] [address]  [phone]    Referral Start Date:  06/10/2025  Referral End Date:   06/10/2026             SCHEDULING  If you do not already have an appointment, please call [phone] to make an appointment.     MORE INFORMATION  If you do not already have a "[employer] account, sign up at: [email or web address]  You can access your medical information, make appointments, see lab results, billing information, and more.  If you have questions regarding this referral, please contact  the [facility] Referrals department at:             [phone]. Monday - Friday 8:00AM - 5:00PM.     Sincerely,    [facility]

## 2025-06-16 NOTE — TELEPHONE ENCOUNTER
Spoke to pharmacist just now, and she confirmed that the Ozempic is covered, no PA required.   She said that the medication is being sent out to patient's address today.   Will inform patient and close encounter.

## 2025-06-16 NOTE — TELEPHONE ENCOUNTER
I tried submitting a PA for the Ozempic last week, but it did not go through due to quantity being too much.   I will call the pharmacy to check if a PA is still needed with the updated script.

## 2025-06-17 ENCOUNTER — APPOINTMENT (OUTPATIENT)
Dept: ADMISSIONS | Facility: MEDICAL CENTER | Age: 25
End: 2025-06-17
Attending: ORTHOPAEDIC SURGERY
Payer: MEDICAID

## 2025-06-24 ENCOUNTER — PRE-ADMISSION TESTING (OUTPATIENT)
Dept: ADMISSIONS | Facility: MEDICAL CENTER | Age: 25
End: 2025-06-24
Attending: ORTHOPAEDIC SURGERY
Payer: MEDICAID

## 2025-06-24 RX ORDER — CALCIUM CARBONATE 500 MG/1
500 TABLET, CHEWABLE ORAL
COMMUNITY

## 2025-06-24 RX ORDER — ACETAMINOPHEN AND CODEINE PHOSPHATE 120; 12 MG/5ML; MG/5ML
1 SOLUTION ORAL DAILY
COMMUNITY

## 2025-06-24 NOTE — PREADMIT AVS NOTE
Current Medications   Medication Instructions    calcium carbonate (TUMS) 500 MG Chew Tab Hold medication day of procedure    norethindrone (MICRONOR) 0.35 MG tablet Stop 24 hours before surgery    vitamin D2 (ERGOCALCIFEROL) 1.25 mg (89440 Units) Cap capsule Stop 7 days before surgery    Semaglutide,0.25 or 0.5MG/DOS, (OZEMPIC, 0.25 OR 0.5 MG/DOSE,) 2 MG/3ML Solution Pen-injector Stop 7 days before surgery    ondansetron (ZOFRAN ODT) 4 MG TABLET DISPERSIBLE As needed medication, may take if needed, including morning of procedure     albuterol 108 (90 Base) MCG/ACT Aero Soln inhalation aerosol As needed medication, may take if needed, including morning of procedure     metFORMIN ER (GLUCOPHAGE XR) 500 MG TABLET SR 24 HR Hold medication day of procedure    FLUoxetine (PROZAC) 10 MG Cap Continue taking medication as prescribed, including morning of procedure     hydrOXYzine pamoate (VISTARIL) 50 MG Cap As needed medication, may take if needed, including morning of procedure     doxepin (SINEQUAN) 25 MG Cap Continue taking medication as prescribed, including morning of procedure     EPINEPHrine (EPIPEN) 0.3 MG/0.3ML Solution Auto-injector solution for injection As needed medication, may take if needed, including morning of procedure     Semaglutide,0.25 or 0.5MG/DOS, (OZEMPIC, 0.25 OR 0.5 MG/DOSE,) 2 MG/3ML Solution Pen-injector Stop 7 days before surgery

## 2025-06-24 NOTE — OR NURSING
Pt states that she has everything she needs, and does not need a nurse navigator appt. Pt with severe latex allergy, surgical scheduling notified.

## 2025-06-25 ENCOUNTER — APPOINTMENT (OUTPATIENT)
Dept: PHYSICAL THERAPY | Facility: REHABILITATION | Age: 25
End: 2025-06-25
Payer: MEDICAID

## 2025-06-26 ENCOUNTER — PRE-ADMISSION TESTING (OUTPATIENT)
Dept: ADMISSIONS | Facility: MEDICAL CENTER | Age: 25
End: 2025-06-26
Attending: ORTHOPAEDIC SURGERY
Payer: MEDICAID

## 2025-06-26 DIAGNOSIS — Z01.810 PRE-OPERATIVE CARDIOVASCULAR EXAMINATION: ICD-10-CM

## 2025-06-26 DIAGNOSIS — Z01.812 PRE-OPERATIVE LABORATORY EXAMINATION: Primary | ICD-10-CM

## 2025-06-26 LAB
ANION GAP SERPL CALC-SCNC: 12 MMOL/L (ref 7–16)
BUN SERPL-MCNC: 6 MG/DL (ref 8–22)
CALCIUM SERPL-MCNC: 9.3 MG/DL (ref 8.5–10.5)
CHLORIDE SERPL-SCNC: 104 MMOL/L (ref 96–112)
CO2 SERPL-SCNC: 21 MMOL/L (ref 20–33)
CREAT SERPL-MCNC: 0.87 MG/DL (ref 0.5–1.4)
EKG IMPRESSION: NORMAL
GFR SERPLBLD CREATININE-BSD FMLA CKD-EPI: 96 ML/MIN/1.73 M 2
GLUCOSE SERPL-MCNC: 134 MG/DL (ref 65–99)
POTASSIUM SERPL-SCNC: 4.6 MMOL/L (ref 3.6–5.5)
SODIUM SERPL-SCNC: 137 MMOL/L (ref 135–145)

## 2025-06-26 PROCEDURE — 93005 ELECTROCARDIOGRAM TRACING: CPT | Mod: TC

## 2025-06-26 PROCEDURE — 93010 ELECTROCARDIOGRAM REPORT: CPT | Performed by: STUDENT IN AN ORGANIZED HEALTH CARE EDUCATION/TRAINING PROGRAM

## 2025-06-26 PROCEDURE — 36415 COLL VENOUS BLD VENIPUNCTURE: CPT

## 2025-06-26 PROCEDURE — 80048 BASIC METABOLIC PNL TOTAL CA: CPT

## 2025-06-29 ENCOUNTER — ANESTHESIA EVENT (OUTPATIENT)
Dept: SURGERY | Facility: MEDICAL CENTER | Age: 25
End: 2025-06-29
Payer: MEDICAID

## 2025-06-30 ENCOUNTER — ANESTHESIA (OUTPATIENT)
Dept: SURGERY | Facility: MEDICAL CENTER | Age: 25
End: 2025-06-30
Payer: MEDICAID

## 2025-06-30 ENCOUNTER — APPOINTMENT (OUTPATIENT)
Dept: RADIOLOGY | Facility: MEDICAL CENTER | Age: 25
End: 2025-06-30
Attending: ORTHOPAEDIC SURGERY
Payer: MEDICAID

## 2025-06-30 ENCOUNTER — HOSPITAL ENCOUNTER (OUTPATIENT)
Facility: MEDICAL CENTER | Age: 25
End: 2025-06-30
Attending: ORTHOPAEDIC SURGERY | Admitting: ORTHOPAEDIC SURGERY
Payer: MEDICAID

## 2025-06-30 VITALS
TEMPERATURE: 96.6 F | HEART RATE: 83 BPM | RESPIRATION RATE: 17 BRPM | SYSTOLIC BLOOD PRESSURE: 146 MMHG | DIASTOLIC BLOOD PRESSURE: 88 MMHG | OXYGEN SATURATION: 94 % | WEIGHT: 293 LBS | BODY MASS INDEX: 48.82 KG/M2 | HEIGHT: 65 IN

## 2025-06-30 PROBLEM — B18.2 CARRIER OF VIRAL HEPATITIS C (HCC): Chronic | Status: ACTIVE | Noted: 2025-06-10

## 2025-06-30 LAB
GLUCOSE BLD STRIP.AUTO-MCNC: 128 MG/DL (ref 65–99)
HCG UR QL: NEGATIVE

## 2025-06-30 PROCEDURE — 160039 HCHG SURGERY MINUTES - EA ADDL 1 MIN LEVEL 3: Performed by: ORTHOPAEDIC SURGERY

## 2025-06-30 PROCEDURE — 700111 HCHG RX REV CODE 636 W/ 250 OVERRIDE (IP): Mod: JZ,UD | Performed by: ANESTHESIOLOGY

## 2025-06-30 PROCEDURE — 700101 HCHG RX REV CODE 250: Performed by: ANESTHESIOLOGY

## 2025-06-30 PROCEDURE — 700102 HCHG RX REV CODE 250 W/ 637 OVERRIDE(OP): Mod: UD | Performed by: ANESTHESIOLOGY

## 2025-06-30 PROCEDURE — 700105 HCHG RX REV CODE 258: Mod: UD | Performed by: ORTHOPAEDIC SURGERY

## 2025-06-30 PROCEDURE — 82962 GLUCOSE BLOOD TEST: CPT | Performed by: ORTHOPAEDIC SURGERY

## 2025-06-30 PROCEDURE — 73620 X-RAY EXAM OF FOOT: CPT | Mod: LT

## 2025-06-30 PROCEDURE — 700105 HCHG RX REV CODE 258: Mod: UD | Performed by: ANESTHESIOLOGY

## 2025-06-30 PROCEDURE — 160002 HCHG RECOVERY MINUTES (STAT): Performed by: ORTHOPAEDIC SURGERY

## 2025-06-30 PROCEDURE — A9270 NON-COVERED ITEM OR SERVICE: HCPCS | Mod: UD | Performed by: ANESTHESIOLOGY

## 2025-06-30 PROCEDURE — 160028 HCHG SURGERY MINUTES - 1ST 30 MINS LEVEL 3: Performed by: ORTHOPAEDIC SURGERY

## 2025-06-30 PROCEDURE — 20680 REMOVAL OF IMPLANT DEEP: CPT | Mod: ASROC | Performed by: SPECIALIST/TECHNOLOGIST, OTHER

## 2025-06-30 PROCEDURE — 160193 HCHG PACU STANDARD - 1ST 60 MINS: Performed by: ORTHOPAEDIC SURGERY

## 2025-06-30 PROCEDURE — 81025 URINE PREGNANCY TEST: CPT

## 2025-06-30 PROCEDURE — 20680 REMOVAL OF IMPLANT DEEP: CPT | Performed by: ORTHOPAEDIC SURGERY

## 2025-06-30 PROCEDURE — C1713 ANCHOR/SCREW BN/BN,TIS/BN: HCPCS | Performed by: ORTHOPAEDIC SURGERY

## 2025-06-30 PROCEDURE — C1776 JOINT DEVICE (IMPLANTABLE): HCPCS | Performed by: ORTHOPAEDIC SURGERY

## 2025-06-30 PROCEDURE — 160025 RECOVERY II MINUTES (STATS): Performed by: ORTHOPAEDIC SURGERY

## 2025-06-30 PROCEDURE — 160048 HCHG OR STATISTICAL LEVEL 1-5: Performed by: ORTHOPAEDIC SURGERY

## 2025-06-30 PROCEDURE — 160015 HCHG STAT PREOP MINUTES: Performed by: ORTHOPAEDIC SURGERY

## 2025-06-30 PROCEDURE — 502000 HCHG MISC OR IMPLANTS RC 0278: Performed by: ORTHOPAEDIC SURGERY

## 2025-06-30 PROCEDURE — 160192 HCHG ANESTHESIA COMPLEX: Performed by: ORTHOPAEDIC SURGERY

## 2025-06-30 PROCEDURE — 28305 INCISE/GRAFT MIDFOOT BONES: CPT | Mod: LT | Performed by: ORTHOPAEDIC SURGERY

## 2025-06-30 PROCEDURE — 160046 HCHG PACU - 1ST 60 MINS PHASE II: Performed by: ORTHOPAEDIC SURGERY

## 2025-06-30 PROCEDURE — 28305 INCISE/GRAFT MIDFOOT BONES: CPT | Mod: ASROC,LT | Performed by: SPECIALIST/TECHNOLOGIST, OTHER

## 2025-06-30 PROCEDURE — 64445 NJX AA&/STRD SCIATIC NRV IMG: CPT | Performed by: ORTHOPAEDIC SURGERY

## 2025-06-30 PROCEDURE — 110371 HCHG SHELL REV 272: Performed by: ORTHOPAEDIC SURGERY

## 2025-06-30 DEVICE — IMPLANTABLE DEVICE: Type: IMPLANTABLE DEVICE | Site: FOOT | Status: FUNCTIONAL

## 2025-06-30 DEVICE — SCREW BONE T8 FULL THREAD L28 MM OD2.7 MM STARDRIVE NONSTERILE VARIAX FOOT PLATE SYSTEM: Type: IMPLANTABLE DEVICE | Site: FOOT | Status: FUNCTIONAL

## 2025-06-30 DEVICE — GRAFT BONE AUGMENT 1.5CC (1/EA): Type: IMPLANTABLE DEVICE | Site: FOOT | Status: FUNCTIONAL

## 2025-06-30 DEVICE — WIRE FIXATION KIRSCHNER TROCAR POINT: Type: IMPLANTABLE DEVICE | Site: FOOT | Status: FUNCTIONAL

## 2025-06-30 RX ORDER — DEXAMETHASONE SODIUM PHOSPHATE 4 MG/ML
INJECTION, SOLUTION INTRA-ARTICULAR; INTRALESIONAL; INTRAMUSCULAR; INTRAVENOUS; SOFT TISSUE PRN
Status: DISCONTINUED | OUTPATIENT
Start: 2025-06-30 | End: 2025-06-30 | Stop reason: SURG

## 2025-06-30 RX ORDER — SODIUM CHLORIDE, SODIUM LACTATE, POTASSIUM CHLORIDE, CALCIUM CHLORIDE 600; 310; 30; 20 MG/100ML; MG/100ML; MG/100ML; MG/100ML
INJECTION, SOLUTION INTRAVENOUS
Status: DISCONTINUED | OUTPATIENT
Start: 2025-06-30 | End: 2025-06-30 | Stop reason: SURG

## 2025-06-30 RX ORDER — ACETAMINOPHEN 500 MG
1000 TABLET ORAL ONCE
Status: COMPLETED | OUTPATIENT
Start: 2025-06-30 | End: 2025-06-30

## 2025-06-30 RX ORDER — ONDANSETRON 2 MG/ML
4 INJECTION INTRAMUSCULAR; INTRAVENOUS
Status: DISCONTINUED | OUTPATIENT
Start: 2025-06-30 | End: 2025-06-30 | Stop reason: HOSPADM

## 2025-06-30 RX ORDER — CEFAZOLIN SODIUM 1 G/3ML
3 INJECTION, POWDER, FOR SOLUTION INTRAMUSCULAR; INTRAVENOUS ONCE
Status: DISCONTINUED | OUTPATIENT
Start: 2025-06-30 | End: 2025-06-30 | Stop reason: HOSPADM

## 2025-06-30 RX ORDER — LIDOCAINE HYDROCHLORIDE 40 MG/ML
SOLUTION TOPICAL PRN
Status: DISCONTINUED | OUTPATIENT
Start: 2025-06-30 | End: 2025-06-30 | Stop reason: SURG

## 2025-06-30 RX ORDER — ROCURONIUM BROMIDE 10 MG/ML
INJECTION, SOLUTION INTRAVENOUS PRN
Status: DISCONTINUED | OUTPATIENT
Start: 2025-06-30 | End: 2025-06-30 | Stop reason: SURG

## 2025-06-30 RX ORDER — BUPIVACAINE HYDROCHLORIDE 5 MG/ML
INJECTION, SOLUTION EPIDURAL; INTRACAUDAL; PERINEURAL
Status: COMPLETED | OUTPATIENT
Start: 2025-06-30 | End: 2025-06-30

## 2025-06-30 RX ORDER — MIDAZOLAM HYDROCHLORIDE 1 MG/ML
1 INJECTION INTRAMUSCULAR; INTRAVENOUS
Status: DISCONTINUED | OUTPATIENT
Start: 2025-06-30 | End: 2025-06-30 | Stop reason: HOSPADM

## 2025-06-30 RX ORDER — HYDRALAZINE HYDROCHLORIDE 20 MG/ML
5 INJECTION INTRAMUSCULAR; INTRAVENOUS
Status: DISCONTINUED | OUTPATIENT
Start: 2025-06-30 | End: 2025-06-30 | Stop reason: HOSPADM

## 2025-06-30 RX ORDER — EPHEDRINE SULFATE 50 MG/ML
5 INJECTION, SOLUTION INTRAVENOUS
Status: DISCONTINUED | OUTPATIENT
Start: 2025-06-30 | End: 2025-06-30 | Stop reason: HOSPADM

## 2025-06-30 RX ORDER — SCOPOLAMINE 1 MG/3D
1 PATCH, EXTENDED RELEASE TRANSDERMAL
Status: DISCONTINUED | OUTPATIENT
Start: 2025-06-30 | End: 2025-06-30 | Stop reason: HOSPADM

## 2025-06-30 RX ORDER — SODIUM CHLORIDE, SODIUM LACTATE, POTASSIUM CHLORIDE, CALCIUM CHLORIDE 600; 310; 30; 20 MG/100ML; MG/100ML; MG/100ML; MG/100ML
INJECTION, SOLUTION INTRAVENOUS CONTINUOUS
Status: DISCONTINUED | OUTPATIENT
Start: 2025-06-30 | End: 2025-06-30 | Stop reason: HOSPADM

## 2025-06-30 RX ORDER — SODIUM CHLORIDE, SODIUM LACTATE, POTASSIUM CHLORIDE, CALCIUM CHLORIDE 600; 310; 30; 20 MG/100ML; MG/100ML; MG/100ML; MG/100ML
INJECTION, SOLUTION INTRAVENOUS CONTINUOUS
Status: ACTIVE | OUTPATIENT
Start: 2025-06-30 | End: 2025-06-30

## 2025-06-30 RX ORDER — OXYCODONE HCL 5 MG/5 ML
10 SOLUTION, ORAL ORAL
Status: COMPLETED | OUTPATIENT
Start: 2025-06-30 | End: 2025-06-30

## 2025-06-30 RX ORDER — OXYCODONE HCL 5 MG/5 ML
5 SOLUTION, ORAL ORAL
Status: COMPLETED | OUTPATIENT
Start: 2025-06-30 | End: 2025-06-30

## 2025-06-30 RX ORDER — HYDROMORPHONE HYDROCHLORIDE 1 MG/ML
0.1 INJECTION, SOLUTION INTRAMUSCULAR; INTRAVENOUS; SUBCUTANEOUS
Status: DISCONTINUED | OUTPATIENT
Start: 2025-06-30 | End: 2025-06-30 | Stop reason: HOSPADM

## 2025-06-30 RX ORDER — ONDANSETRON 2 MG/ML
INJECTION INTRAMUSCULAR; INTRAVENOUS PRN
Status: DISCONTINUED | OUTPATIENT
Start: 2025-06-30 | End: 2025-06-30 | Stop reason: SURG

## 2025-06-30 RX ORDER — DIPHENHYDRAMINE HYDROCHLORIDE 50 MG/ML
12.5 INJECTION, SOLUTION INTRAMUSCULAR; INTRAVENOUS
Status: DISCONTINUED | OUTPATIENT
Start: 2025-06-30 | End: 2025-06-30 | Stop reason: HOSPADM

## 2025-06-30 RX ORDER — HALOPERIDOL 5 MG/ML
1 INJECTION INTRAMUSCULAR
Status: DISCONTINUED | OUTPATIENT
Start: 2025-06-30 | End: 2025-06-30 | Stop reason: HOSPADM

## 2025-06-30 RX ORDER — ALBUTEROL SULFATE 5 MG/ML
2.5 SOLUTION RESPIRATORY (INHALATION)
Status: DISCONTINUED | OUTPATIENT
Start: 2025-06-30 | End: 2025-06-30 | Stop reason: HOSPADM

## 2025-06-30 RX ORDER — HYDROMORPHONE HYDROCHLORIDE 1 MG/ML
0.2 INJECTION, SOLUTION INTRAMUSCULAR; INTRAVENOUS; SUBCUTANEOUS
Status: DISCONTINUED | OUTPATIENT
Start: 2025-06-30 | End: 2025-06-30 | Stop reason: HOSPADM

## 2025-06-30 RX ORDER — LIDOCAINE HYDROCHLORIDE 20 MG/ML
INJECTION, SOLUTION EPIDURAL; INFILTRATION; INTRACAUDAL; PERINEURAL PRN
Status: DISCONTINUED | OUTPATIENT
Start: 2025-06-30 | End: 2025-06-30 | Stop reason: SURG

## 2025-06-30 RX ORDER — HYDROMORPHONE HYDROCHLORIDE 1 MG/ML
0.4 INJECTION, SOLUTION INTRAMUSCULAR; INTRAVENOUS; SUBCUTANEOUS
Status: DISCONTINUED | OUTPATIENT
Start: 2025-06-30 | End: 2025-06-30 | Stop reason: HOSPADM

## 2025-06-30 RX ORDER — CEFAZOLIN SODIUM 1 G/3ML
INJECTION, POWDER, FOR SOLUTION INTRAMUSCULAR; INTRAVENOUS PRN
Status: DISCONTINUED | OUTPATIENT
Start: 2025-06-30 | End: 2025-06-30 | Stop reason: SURG

## 2025-06-30 RX ORDER — KETOROLAC TROMETHAMINE 15 MG/ML
INJECTION, SOLUTION INTRAMUSCULAR; INTRAVENOUS PRN
Status: DISCONTINUED | OUTPATIENT
Start: 2025-06-30 | End: 2025-06-30 | Stop reason: SURG

## 2025-06-30 RX ADMIN — CEFAZOLIN 3 G: 1 INJECTION, POWDER, FOR SOLUTION INTRAMUSCULAR; INTRAVENOUS at 11:51

## 2025-06-30 RX ADMIN — ROCURONIUM BROMIDE 50 MG: 10 INJECTION INTRAVENOUS at 11:51

## 2025-06-30 RX ADMIN — FENTANYL CITRATE 50 MCG: 50 INJECTION, SOLUTION INTRAMUSCULAR; INTRAVENOUS at 13:15

## 2025-06-30 RX ADMIN — HYDROMORPHONE HYDROCHLORIDE 0.2 MG: 1 INJECTION, SOLUTION INTRAMUSCULAR; INTRAVENOUS; SUBCUTANEOUS at 13:37

## 2025-06-30 RX ADMIN — SCOPOLAMINE 1 PATCH: 1.5 PATCH, EXTENDED RELEASE TRANSDERMAL at 10:54

## 2025-06-30 RX ADMIN — SUGAMMADEX 200 MG: 100 INJECTION, SOLUTION INTRAVENOUS at 12:39

## 2025-06-30 RX ADMIN — FENTANYL CITRATE 100 MCG: 50 INJECTION, SOLUTION INTRAMUSCULAR; INTRAVENOUS at 11:44

## 2025-06-30 RX ADMIN — SODIUM CHLORIDE, POTASSIUM CHLORIDE, SODIUM LACTATE AND CALCIUM CHLORIDE: 600; 310; 30; 20 INJECTION, SOLUTION INTRAVENOUS at 10:55

## 2025-06-30 RX ADMIN — DEXAMETHASONE SODIUM PHOSPHATE 8 MG: 4 INJECTION INTRA-ARTICULAR; INTRALESIONAL; INTRAMUSCULAR; INTRAVENOUS; SOFT TISSUE at 11:51

## 2025-06-30 RX ADMIN — OXYCODONE HYDROCHLORIDE 10 MG: 5 SOLUTION ORAL at 13:16

## 2025-06-30 RX ADMIN — ONDANSETRON 4 MG: 2 INJECTION INTRAMUSCULAR; INTRAVENOUS at 12:39

## 2025-06-30 RX ADMIN — FENTANYL CITRATE 50 MCG: 50 INJECTION, SOLUTION INTRAMUSCULAR; INTRAVENOUS at 13:20

## 2025-06-30 RX ADMIN — LIDOCAINE HYDROCHLORIDE 4 ML: 40 SOLUTION TOPICAL at 11:51

## 2025-06-30 RX ADMIN — BUPIVACAINE HYDROCHLORIDE 30 ML: 5 INJECTION, SOLUTION EPIDURAL; INTRACAUDAL; PERINEURAL at 11:43

## 2025-06-30 RX ADMIN — KETOROLAC TROMETHAMINE 15 MG: 15 INJECTION, SOLUTION INTRAMUSCULAR; INTRAVENOUS at 12:39

## 2025-06-30 RX ADMIN — ACETAMINOPHEN 1000 MG: 500 TABLET ORAL at 10:54

## 2025-06-30 RX ADMIN — PROPOFOL 200 MG: 10 INJECTION, EMULSION INTRAVENOUS at 11:51

## 2025-06-30 RX ADMIN — SODIUM CHLORIDE, POTASSIUM CHLORIDE, SODIUM LACTATE AND CALCIUM CHLORIDE: 600; 310; 30; 20 INJECTION, SOLUTION INTRAVENOUS at 11:37

## 2025-06-30 RX ADMIN — LIDOCAINE HYDROCHLORIDE 60 MG: 20 INJECTION, SOLUTION EPIDURAL; INFILTRATION; INTRACAUDAL; PERINEURAL at 11:43

## 2025-06-30 ASSESSMENT — PAIN DESCRIPTION - PAIN TYPE
TYPE: SURGICAL PAIN
TYPE: SURGICAL PAIN
TYPE: ACUTE PAIN;SURGICAL PAIN
TYPE: ACUTE PAIN;SURGICAL PAIN

## 2025-06-30 ASSESSMENT — PAIN SCALES - GENERAL: PAIN_LEVEL: 3

## 2025-06-30 ASSESSMENT — FIBROSIS 4 INDEX: FIB4 SCORE: 0.19

## 2025-06-30 NOTE — LETTER
June 16, 2025    Patient Name: Katie Graham  Surgeon Name: Nik Hurd M.D.  Surgery Facility: [facility] [facility]  Surgery Date: 6/30/2025    The time of your surgery is not final and may change up to and until the day of your surgery. You will be contacted 24-48 hours prior to your surgery date with your check-in and surgery time.    If you will not be at one of the below numbers please call the surgery scheduler at [phone]  Preferred Phone: [phone]    BEFORE YOUR SURGERY   Pre Registration and/or Lab Work must be done within and no earlier than 28 days prior to your surgery date. Your scheduled facility will contact you regarding all required preregistration and/or lab work. If you have not been contacted within 7 days of your scheduled procedure please call [facility] at [phone] for an appointment as soon as possible.    The [facility] offers a class for patients undergoing a total hip/knee replacement surgery scheduled at our outpatient surgery center. Information about what to expect for preparation, the day of surgery and recovery will be given. We highly recommend bringing your support for surgery with you to the class, as well as any questions you may have. If you are interested in attending the class, please call [phone] for scheduling.     Pre op Appointment:  Instructions: Bring a list of all medications you are taking including the dosing and frequency.    DAY OF YOUR SURGERY    Nothing to eat or drink after midnight the night before surgery. This includes mints, gums, etc.     Refrain from smoking any substance or consuming any tobacco products after midnight prior to surgery. It may interfere with the anesthetic and frequently produces nausea during the recovery period.    Continue taking all lifesaving medications. Including the morning of your surgery with small sip of water.  If you  "have questions regarding what medication you can take on the day of surgery, please contact the preadmit department ([phone]).    Please do NOT take on the day of surgery:  Any diabetic medications  Diuretics: examples- Furosemide (Lasix), Spironolactone, Hydrochlorothiazide.   Ace Inhibitors: examples - Lisinopril, Ramipril, Enalapril  \"ARB's\": examples: Losartan, Olmesartan, Valsartan    Please arrive at the hospital/surgery center at the check-in time provided.   An adult will need to bring you and take you home after your surgery.     AFTER YOUR SURGERY  Post op Appointment:   Date: 07/15/25   Time: 01:00PM   With: Nik Hurd MD   Location: [facility] [address] [address]    - Therapy- Your first appointment should be 2  week(s) after your surgery. For your convenience we have 4 Physical Therapy locations: [place], [facility], [place], and [facility]. Call our office ASAP to schedule an appointment at [phone] or take the enclosed Therapy Prescription to a facility of your choice.     TIME OFF WORK  FMLA or Disability forms can be faxed directly to: [phone] or you may drop them off at [address] [place] [address]. Our office charges a $35.00 fee per form. Forms will be completed within 10 business days of drop off and payment received. For the status of your forms you may contact our disability office directly [phone].    MEDICATION INSTRUCTIONS **Please read section completely**    Please consult your prescribing physician if you are on life saving blood thinners (Plavix, Coumadin, Eliquis, Xarelto, etc.) for when to stop prior to surgery    The following medications should be stopped a minimum of 14 days prior to surgery:    Anorectics: Phentermine (Adipex-P, Lomaira and Suprenza), Phentermine-topiramate (Qsymia),   Bupropion-naltrexone (Contrave)    **If you are on Bupropion for anxiety/depression, please continue this medication up until the day of surgery.     The " following should be stopped a minimum of 7 days prior to surgery:  All vitamins and supplements  Ozempic, Trulicity, Victoza    The following medications should be stopped 5 days prior to surgery:  Anti-Inflammatories: examples- Aspirin, Aspirin products, Ibuprofen/Advil, Aleve, Naproxen, Meloxicam, Celebrex, etc.    The following medications should be stopped 4 days prior to surgery:  Certain oral diabetic medications: ertugliflozin (Steglatro)    The following medications should be stopped a minimum of 3 days prior to surgery:  Certain oral diabetic medications: canagliflozin (Invokana), dapagliflozin (Farxiga), empagliflozin (Jardiance)  Opiod Partial Agonists/Opioid Antagonists: Buprenorphine (Suboxone, Belbuca, Butrans, Probuphine Implant, Sublocade), Naltrexone (ReVia, Vivitrol), Naloxone  PDE-5 inhibitors: Sildenafil (Viagra), Tadalafil (Cialis), Vardenafil (Levitra), Avanafil (Stendra)  MAO Inhibitors: Rasagiline (Azilect), Selegiline (Eldepryl, Emsam, Selapar), Isocarboxazid (Marplan), Phenelzine (Nardil)         Thank you,     [facility]    Contact Us:  [facility]   [phone]  Web: [facility]

## 2025-06-30 NOTE — ANESTHESIA TIME REPORT
Anesthesia Start and Stop Event Times       Date Time Event    6/30/2025 1137 Anesthesia Start     1158 Ready for Procedure     1300 Anesthesia Stop          Responsible Staff  06/30/25      Name Role Begin End    Osvaldo Hoffman M.D. Anesth 1137 1300          Overtime Reason:  no overtime (within assigned shift)    Comments:

## 2025-06-30 NOTE — PROGRESS NOTES
Called and spoke with patient's mom.  Discussed case with peds hospitalist team.  Plan to repeat bilirubin in 24 to 48 hours.  Also recommend referral to GI.  Some question of underlying Gilbert's as cause.    Please call mom with referral information.   Medication history reviewed with pt. Med rec is complete.  Allergies reviewed, per pt    Patient has not had any outpatient antibiotics in the last 30 days.    Pt is not on any anticoagulants      Dispense history available in EPIC? YES

## 2025-06-30 NOTE — INTERVAL H&P NOTE
Consented Procedure: LEFT MIDFOOT OSTEOTOMY, POSSIBLE REMOVAL OF HARDWARE, OSTEOTOMY, CALCANEUS, POSSIBLE BONE AUTOGRAFT AND ALLOGRAFT  I have examined the patient, provided the risks, benefits, and alternatives to the procedure(s) indicated on the signed consent form, and the patient wishes to proceed.    H&P updated. The patient was examined and no change in history or physical exam.  Cardiovascular and pulmonary exam unremarkable.      Nik Hurd M.D.  06/30/25 11:16 AM

## 2025-06-30 NOTE — OP REPORT
06/30/25       PREOPERATIVE DIAGNOSES:  Left painful internal fixation heel  Left midfoot osteotomy nonunion     POSTOPERATIVE DIAGNOSES:  Same    PROCEDURE PERFORMED:  Left removal deep implant heel  Left medial cuneiform osteotomy with autologous bone graft     SURGEON:  Nik Hurd MD     FIRST ASSISTANT: Helena Ortega CFA     ANESTHESIA:  General endotracheal with regional block per my request postoperative pain management     ESTIMATED BLOOD LOSS:  None.     COMPLICATIONS:  None.    FINDINGS:  See preoperative diagnosis     POSTOPERATIVE PLAN:  Nonweightbearing  Follow-up in 2 weeks     INDICATIONS:  The patient is a pleasant 25 y.o. person who has had   problems with the left foot.  Options were discussed   including operative and nonoperative.  The patients elected to undergo operative   intervention.  The above procedure was discussed.  All questions were   answered.  Risks of surgery explained, which included but not limited to   explained wound problems, infection, nerve injury, vascular injury, need for   further surgery.  The patient understands that they could have persistent risk of    pain and need for further surgery.  The patients understands and accepts these risks   and agreed to proceed.  Site was marked by myself prior to receiving   psychotropic medicines.     PROCEDURE IN DETAIL:  The patient was brought to the operating room and    underwent general endotracheal anesthetic without complications.  Left lower   extremity was prepped and draped in standard fashion in supine position with   all appropriate padding.  Positive site verification confirmed the appropriate   extremity as well as above procedure and confirmation that the patient received   preoperative antibiotics.  The leg was elevated and tourniquet was inflated to 250 mmHg.    Incision was made over the heel.  This was bluntly dissected down to the level of the internal fixation.  Using the appropriate screwdriver the heel  screw and washer were removed without problems.  The screw was intact.  The wound was then irrigated out and closed with interrupted nylon suture.    Dorsal incision was made following her previous cotton osteotomy incision.  This was dissected down to the level of the medial cuneiform.  The section of allograft that had become dislodged endophyte and this was removed sharply with a 15 blade.  There is no other bone in the soft tissue that was palpable or visual.  With C arm guidance a osteotomy was made in the medial cuneiform at the site of her previous nonunion site.  Once was completed was opened up with an osteotome.  Using a series of sizers 12 mm for cotton wedge.  To get the forefoot in a neutral alignment out of the forefoot varus. A small stab incision was made over the lateral aspect of the calcaneus.  Blunt dissection is made down to the level of the calcaneus.  An Acumed 7mm bone graft harvester was used to take multiple cores of cancellous bone.  The incision was then closed with an interrupted nylon suture.  The bone graft was then mixed with augment and this was then impacted in the hole in the 12 mm dorsal by 18 x 18 metal wedge.  Went bone graft was also placed in the plantar aspect.  The wedge was then impacted in place using a Hinchman retractor.  Once the wedge was set down the forefoot alignment was again neutral with the hindfoot in slight valgus.  The fixation was then augmented with a 2 4 plate that was cut to 3 holes.  A 2.7 nonlocking screw was placed on each side of the metal wedge to help hold the wedge down.  Final C-arm imaging demonstrated satisfactory position mental rotation alignment the foot.  Wound was irrigated the complication.  Was closed with Vicryl 3 nylon.    Sterile dressings were applied.  Tourniquet was released.  She was placed into a posterior sugar-tong splint.  Patient was transferred to the recovery room in good condition.    Utilization of Helena Ortega was  necessary for patient positioning needing holding retracting wound closure and dressing placement.  The assistant was present throughout the entire procedure.

## 2025-06-30 NOTE — ANESTHESIA POSTPROCEDURE EVALUATION
Patient: Katie Graham    Procedure Summary       Date: 06/30/25 Room / Location: [place] [address] / SURGERY [facility]    Anesthesia Start: 1137 Anesthesia Stop: 1300    Procedures:       LEFT MIDFOOT OSTEOTOMY WITH BONE GRAFT (Left: Foot)      REMOVAL OF HARDWARE (Left: Foot) Diagnosis:       Pain of left foot      (OSTEOTOMY NONUNION; PAINFUL INTERNAL FIXATION)    Surgeons: Nik Hurd M.D. Responsible Provider: Osvaldo Hoffman M.D.    Anesthesia Type: general, peripheral nerve block ASA Status: 3            Final Anesthesia Type: general, peripheral nerve block  Last vitals  BP   Blood Pressure: 135/72    Temp   36.5 °C (97.7 °F)    Pulse   80   Resp   16    SpO2   93 %      Anesthesia Post Evaluation    Patient location during evaluation: PACU  Patient participation: complete - patient participated  Level of consciousness: awake  Pain score: 3    Airway patency: patent  Anesthetic complications: no  Cardiovascular status: adequate  Respiratory status: acceptable  Hydration status: stable    PONV: controlled    patient able to participate, but full recovery from regional anesthesia has not occurred and is not expected within the stipulated timeframe for the completion of the evaluation      No notable events documented.     Nurse Pain Score: 4 (NPRS)

## 2025-06-30 NOTE — OR NURSING
1257 Pt arrived to PACU with Anesthesiologist and OR RN. OPA in place. Even, unlabored respirations. VSS. LLE dressing CDI. Ice pack applied. LLE elevated on two pillows.    1300 OPA d/c'd. AAOx4.  Denies pain. Denies nausea.     1315 Pt c/o 9/10 LLE pain, PRN oxycodone and fentanyl given, see MAR.     1340 Hardware delivered to bedside.    1345 POC update given to Meaghan, friend/ride, over the phone. All questions answered.     1400 Pt meets phase II criteria. Report called to TIERRA Chatman.     1402 Pt transported to pre op 32 with RN. Chart and VANESSA folder with patient.

## 2025-06-30 NOTE — ANESTHESIA PREPROCEDURE EVALUATION
Case: [id number] Date/Time: 06/30/25 1145    Procedures:       LEFT MIDFOOT OSTEOTOMY (Left: Foot)      POSSIBLE REMOVAL OF HARDWARE (Left: Foot)      OSTEOTOMY, CALCANEUS (Left: Foot)      POSSIBLE BONE AUTOGRAFT AND ALLOGRAFT (Left: Foot)    Anesthesia type: General    Diagnosis: Pain of left foot [M79.672]    Pre-op diagnosis: Pain of left foot [M79.672]    Location: [place] [address] / SURGERY [facility]    Surgeons: Nik Hurd M.D.            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)   (positive) Chronic nausea   (positive) Chronic pain of left ankle   (positive) Class 3 severe obesity in adult   (positive) Crohn's disease (HCC)   (positive) Crohn's disease of both small and large intestine without complication (HCC)   (positive) Nancy-Danlos disease   (positive) History of migraine   (positive) History of suicide attempt   (positive) Housing situation unstable   (positive) Irregular menses   (positive) Major depressive disorder with psychotic features (HCC)   (positive) Sensorineural hearing loss (SNHL) of both ears   (positive) Tobacco dependence       Physical Exam    Airway   Mallampati: III  TM distance: >3 FB  Neck ROM: full       Cardiovascular   Rhythm: regular  Rate: normal     Dental - normal exam           Pulmonary (+) decreased breath sounds     Abdominal (+) obese     Neurological - normal exam                   Anesthesia Plan    ASA 3   ASA physical status 3 criteria: morbid obesity - BMI greater than or equal to 40    Plan - general and peripheral nerve block     Peripheral nerve block will be post-op pain control  Airway plan will be  ETT          Induction: intravenous and rapid sequence    Postoperative Plan: Postoperative administration of opioids is intended.    Pertinent diagnostic labs and testing reviewed    Informed Consent:    Anesthetic plan and risks discussed with patient.    Use of blood products discussed with: patient whom consented to blood products.

## 2025-06-30 NOTE — OR NURSING
1407: Pt arrived from PACU with RN via fareed. Pt belongings at bedside and accounted for.   Surgical site has DIP. Pt transferred to chair using non-weight bearing on LLE. Pt in stable condition and VSS. Pt states pain is 3 out of 10 and declines of nausea.    Pt offered crackers and juice.    1426: Discharge instructions were reviewed with the patient and friends. Both parties verbalized understanding, and all questions were answered. Patient refused crutches as she has these and a scooter. Pt's vital signs are stable. The patient is resting comfortably, with normal chest rise and fall and unlabored breathing. Continuous heart rate and SpO? monitoring is in place. Call light within reach.     1439: IV removed by RN.    1440: Patient discharged via wheelchair accompanied by RN. The patient left the unit in stable condition, with friends waiting at ground level on [address] and [address] to take the patient home.

## 2025-06-30 NOTE — ANESTHESIA PROCEDURE NOTES
Airway    Date/Time: 6/30/2025 11:51 AM    Performed by: Osvaldo Hoffman M.D.  Authorized by: Osvaldo Hoffman M.D.    Location:  OR  Urgency:  Elective  Difficult Airway: No    Indications for Airway Management:  Anesthesia      Spontaneous Ventilation: absent    Sedation Level:  Deep  Preoxygenated: Yes    Patient Position:  Sniffing  Mask Difficulty Assessment:  0 - not attempted  Final Airway Type:  Endotracheal airway  Final Endotracheal Airway:  ETT  Cuffed: Yes    Technique Used for Successful ETT Placement:  Direct laryngoscopy  Devices/Methods Used in Placement:  Intubating stylet    Insertion Site:  Oral  Blade Type:  Pato  Laryngoscope Blade/Videolaryngoscope Blade Size:  4  ETT Size (mm):  7.0  Measured from:  Teeth  ETT to Teeth (cm):  22  Placement Verified by: auscultation and capnometry    Cormack-Lehane Classification:  Grade IIa - partial view of glottis  Number of Attempts at Approach:  1

## 2025-06-30 NOTE — ANESTHESIA PROCEDURE NOTES
Peripheral Block    Date/Time: 6/30/2025 11:43 AM    Performed by: Osvaldo Hoffman M.D.  Authorized by: Osvaldo Hoffman M.D.    Patient Location:  OR  Start Time:  6/30/2025 11:43 AM  Reason for Block: at surgeon's request and post-op pain management ONLY    patient identified, IV checked, site marked, risks and benefits discussed, surgical consent, monitors and equipment checked, pre-op evaluation and timeout performed    Patient Position:  Supine  Prep: ChloraPrep    Monitoring:  Heart rate, continuous pulse ox and cardiac monitor  Block Region:  Lower Extremity  Lower Extremity - Block Type:  SCIATIC nerve block, lateral approach    Laterality:  Left  Procedures: ultrasound guided  Image captured, interpreted and electronically stored.  Local Infiltration:  Lidocaine  Strength:  1 %  Dose:  3 ml  Block Type:  Single-shot  Needle Length:  100mm  Needle Gauge:  21 G  Needle Localization:  Ultrasound guidance  Ultrasound picture in chart  Injection Assessment:  Negative aspiration for heme, no paresthesia on injection, incremental injection and local visualized surrounding nerve on ultrasound  Evidence of intravascular injection: No     US Guided Sciatic Nerve Block   US probe placed several cm proximal to popliteal crease on posterior thigh and scanned caudad and cephalad until Sciatic Nerve (SN) identified superficial/lateral to popliteal artery.  Needle inserted lateral to probe in an in plane approach under direct visualization to a perineural position.  After negative aspiration LA injected with ease and visualized surrounding the SN.    Infrapatellar saphenous supplemented.

## 2025-06-30 NOTE — DISCHARGE INSTRUCTIONS
HOME CARE INSTRUCTIONS    ACTIVITY: Rest and take it easy for the first 24 hours.  A responsible adult is recommended to remain with you during that time.  It is normal to feel sleepy.  We encourage you to not do anything that requires balance, judgment or coordination.    FOR 24 HOURS DO NOT:  Drive, operate machinery or run household appliances.  Drink beer or alcoholic beverages.  Make important decisions or sign legal documents.    SPECIAL INSTRUCTIONS:   Non-weight bearing   Follow-up in 2 weeks  Crutches if needed    DIET: To avoid nausea, slowly advance diet as tolerated, avoiding spicy or greasy foods for the first day.  Add more substantial food to your diet according to your physician's instructions.  Babies can be fed formula or breast milk as soon as they are hungry.  INCREASE FLUIDS AND FIBER TO AVOID CONSTIPATION.    SURGICAL DRESSING/BATHING: Do not take baths, swim, or use a hot tub (do not submerge in water) until your health care provider approves. Ask your health care provider if you may take showers. You may only be allowed to take sponge baths.  Keep the bandage (dressing) dry.    MEDICATIONS: Resume taking daily medication.  Take prescribed pain medication with food.  If no medication is prescribed, you may take non-aspirin pain medication if needed.  PAIN MEDICATION CAN BE VERY CONSTIPATING.  Take a stool softener or laxative such as senokot, pericolace, or milk of magnesia if needed.    Prescription given for Oxycodone 5mg.  Last pain medication given: Oxycodone 10mg at 1:15pm.    A follow-up appointment should be arranged with your doctor in 2 weeks; call Dr. Hurd's office to schedule: [phone].    You should CALL YOUR PHYSICIAN if you develop:  Fever greater than 101 degrees F.  Pain not relieved by medication, or persistent nausea or vomiting.  Excessive bleeding (blood soaking through dressing) or unexpected drainage from the wound.  Extreme redness or swelling around the  incision site, drainage of pus or foul smelling drainage.  Inability to urinate or empty your bladder within 8 hours.  Problems with breathing or chest pain.    You should call 911 if you develop problems with breathing or chest pain.  If you are unable to contact your doctor or surgical center, you should go to the nearest emergency room or urgent care center.  Physician's telephone #: [phone]    MILD FLU-LIKE SYMPTOMS ARE NORMAL.  YOU MAY EXPERIENCE GENERALIZED MUSCLE ACHES, THROAT IRRITATION, HEADACHE AND/OR SOME NAUSEA.    If any questions arise, call your doctor.  If your doctor is not available, please feel free to call the Surgical Center at [phone].  The Center is open Monday through Friday from 7AM to 7PM.      A registered nurse may call you a few days after your surgery to see how you are doing after your procedure.    You may also receive a survey in the mail within the next two weeks and we ask that you take a few moments to complete the survey and return it to us.  Our goal is to provide you with very good care and we value your comments.     Depression / Suicide Risk    As you are discharged from this [facility] facility, it is important to learn how to keep safe from harming yourself.    Recognize the warning signs:  Abrupt changes in personality, positive or negative- including increase in energy   Giving away possessions  Change in eating patterns- significant weight changes-  positive or negative  Change in sleeping patterns- unable to sleep or sleeping all the time   Unwillingness or inability to communicate  Depression  Unusual sadness, discouragement and loneliness  Talk of wanting to die  Neglect of personal appearance   Rebelliousness- reckless behavior  Withdrawal from people/activities they love  Confusion- inability to concentrate     If you or a loved one observes any of these behaviors or has concerns about self-harm, here's what you can do:  Talk about it- your feelings  and reasons for harming yourself  Remove any means that you might use to hurt yourself (examples: pills, rope, extension cords, firearm)  Get professional help from the community (Mental Health, Substance Abuse, psychological counseling)  Do not be alone:Call your Safe Contact- someone whom you trust who will be there for you.  Call your local CRISIS HOTLINE 489-4054 or 901-902-1798  Call your local Children's Mobile Crisis Response Team [place] (014) 035-3891 or www.[employer]  Call the toll free National Suicide Prevention Hotlines   National Suicide Prevention Lifeline 919-176-CMBB (3444)  National [place] Line Network 800-SUICIDE (062-3226)    I acknowledge receipt and understanding of these Home Care instructions.

## 2025-07-14 ENCOUNTER — TELEPHONE (OUTPATIENT)
Dept: INTERNAL MEDICINE | Facility: OTHER | Age: 25
End: 2025-07-14
Payer: MEDICAID

## 2025-07-14 NOTE — TELEPHONE ENCOUNTER
Patient called asking for refill of medications but informed her that medication was sent by Dr. Rubio on 6-10-25 and most still had refills. Pt will call pharmacy for help and will call us back if she needs more help

## 2025-08-05 ENCOUNTER — APPOINTMENT (OUTPATIENT)
Dept: INTERNAL MEDICINE | Facility: OTHER | Age: 25
End: 2025-08-05
Payer: MEDICAID

## 2025-08-07 ENCOUNTER — APPOINTMENT (OUTPATIENT)
Dept: INTERNAL MEDICINE | Facility: OTHER | Age: 25
End: 2025-08-07
Payer: MEDICAID

## 2025-08-10 ENCOUNTER — HOSPITAL ENCOUNTER (EMERGENCY)
Facility: MEDICAL CENTER | Age: 25
End: 2025-08-10
Attending: EMERGENCY MEDICINE
Payer: MEDICAID

## 2025-08-10 VITALS
BODY MASS INDEX: 48.82 KG/M2 | DIASTOLIC BLOOD PRESSURE: 85 MMHG | HEIGHT: 65 IN | RESPIRATION RATE: 14 BRPM | SYSTOLIC BLOOD PRESSURE: 164 MMHG | OXYGEN SATURATION: 95 % | HEART RATE: 65 BPM | WEIGHT: 293 LBS | TEMPERATURE: 97.6 F

## 2025-08-10 DIAGNOSIS — E86.0 DEHYDRATION: ICD-10-CM

## 2025-08-10 DIAGNOSIS — R19.7 NAUSEA VOMITING AND DIARRHEA: ICD-10-CM

## 2025-08-10 DIAGNOSIS — K22.6 MALLORY-WEISS TEAR: ICD-10-CM

## 2025-08-10 DIAGNOSIS — R73.9 HYPERGLYCEMIA: ICD-10-CM

## 2025-08-10 DIAGNOSIS — I10 HYPERTENSION, UNSPECIFIED TYPE: ICD-10-CM

## 2025-08-10 DIAGNOSIS — E66.01 MORBID OBESITY (HCC): ICD-10-CM

## 2025-08-10 DIAGNOSIS — K52.9 GASTROENTERITIS: Primary | ICD-10-CM

## 2025-08-10 DIAGNOSIS — R74.01 TRANSAMINITIS: ICD-10-CM

## 2025-08-10 DIAGNOSIS — R11.2 NAUSEA VOMITING AND DIARRHEA: ICD-10-CM

## 2025-08-10 LAB
ABO GROUP BLD: NORMAL
ALBUMIN SERPL BCP-MCNC: 4 G/DL (ref 3.2–4.9)
ALBUMIN/GLOB SERPL: 1 G/DL
ALP SERPL-CCNC: 77 U/L (ref 30–99)
ALT SERPL-CCNC: 114 U/L (ref 2–50)
ANION GAP SERPL CALC-SCNC: 13 MMOL/L (ref 7–16)
APTT PPP: 26.6 SEC (ref 24.7–36)
AST SERPL-CCNC: 60 U/L (ref 12–45)
BASOPHILS # BLD AUTO: 1.1 % (ref 0–1.8)
BASOPHILS # BLD: 0.06 K/UL (ref 0–0.12)
BILIRUB SERPL-MCNC: 0.4 MG/DL (ref 0.1–1.5)
BLD GP AB SCN SERPL QL: NORMAL
BUN SERPL-MCNC: 9 MG/DL (ref 8–22)
CALCIUM ALBUM COR SERPL-MCNC: 9.2 MG/DL (ref 8.5–10.5)
CALCIUM SERPL-MCNC: 9.2 MG/DL (ref 8.5–10.5)
CHLORIDE SERPL-SCNC: 102 MMOL/L (ref 96–112)
CO2 SERPL-SCNC: 18 MMOL/L (ref 20–33)
CREAT SERPL-MCNC: 0.8 MG/DL (ref 0.5–1.4)
EOSINOPHIL # BLD AUTO: 0.07 K/UL (ref 0–0.51)
EOSINOPHIL NFR BLD: 1.3 % (ref 0–6.9)
ERYTHROCYTE [DISTWIDTH] IN BLOOD BY AUTOMATED COUNT: 39.5 FL (ref 35.9–50)
GFR SERPLBLD CREATININE-BSD FMLA CKD-EPI: 106 ML/MIN/1.73 M 2
GLOBULIN SER CALC-MCNC: 3.9 G/DL (ref 1.9–3.5)
GLUCOSE SERPL-MCNC: 331 MG/DL (ref 65–99)
HCT VFR BLD AUTO: 41.9 % (ref 37–47)
HGB BLD-MCNC: 14.5 G/DL (ref 12–16)
IMM GRANULOCYTES # BLD AUTO: 0.02 K/UL (ref 0–0.11)
IMM GRANULOCYTES NFR BLD AUTO: 0.4 % (ref 0–0.9)
INR PPP: 1.01 (ref 0.87–1.13)
LIPASE SERPL-CCNC: 56 U/L (ref 11–82)
LYMPHOCYTES # BLD AUTO: 1.73 K/UL (ref 1–4.8)
LYMPHOCYTES NFR BLD: 33.1 % (ref 22–41)
MCH RBC QN AUTO: 28.2 PG (ref 27–33)
MCHC RBC AUTO-ENTMCNC: 34.6 G/DL (ref 32.2–35.5)
MCV RBC AUTO: 81.5 FL (ref 81.4–97.8)
MONOCYTES # BLD AUTO: 0.54 K/UL (ref 0–0.85)
MONOCYTES NFR BLD AUTO: 10.3 % (ref 0–13.4)
NEUTROPHILS # BLD AUTO: 2.8 K/UL (ref 1.82–7.42)
NEUTROPHILS NFR BLD: 53.8 % (ref 44–72)
NRBC # BLD AUTO: 0 K/UL
NRBC BLD-RTO: 0 /100 WBC (ref 0–0.2)
PLATELET # BLD AUTO: 308 K/UL (ref 164–446)
PMV BLD AUTO: 11.3 FL (ref 9–12.9)
POTASSIUM SERPL-SCNC: 4.3 MMOL/L (ref 3.6–5.5)
PROT SERPL-MCNC: 7.9 G/DL (ref 6–8.2)
PROTHROMBIN TIME: 13.3 SEC (ref 12–14.6)
RBC # BLD AUTO: 5.14 M/UL (ref 4.2–5.4)
RH BLD: NORMAL
SODIUM SERPL-SCNC: 133 MMOL/L (ref 135–145)
WBC # BLD AUTO: 5.2 K/UL (ref 4.8–10.8)

## 2025-08-10 PROCEDURE — 86900 BLOOD TYPING SEROLOGIC ABO: CPT

## 2025-08-10 PROCEDURE — 96375 TX/PRO/DX INJ NEW DRUG ADDON: CPT

## 2025-08-10 PROCEDURE — 86850 RBC ANTIBODY SCREEN: CPT

## 2025-08-10 PROCEDURE — 83690 ASSAY OF LIPASE: CPT

## 2025-08-10 PROCEDURE — 85730 THROMBOPLASTIN TIME PARTIAL: CPT

## 2025-08-10 PROCEDURE — 86901 BLOOD TYPING SEROLOGIC RH(D): CPT

## 2025-08-10 PROCEDURE — A9270 NON-COVERED ITEM OR SERVICE: HCPCS | Mod: UD | Performed by: EMERGENCY MEDICINE

## 2025-08-10 PROCEDURE — 99284 EMERGENCY DEPT VISIT MOD MDM: CPT

## 2025-08-10 PROCEDURE — 96374 THER/PROPH/DIAG INJ IV PUSH: CPT

## 2025-08-10 PROCEDURE — 80053 COMPREHEN METABOLIC PANEL: CPT

## 2025-08-10 PROCEDURE — 700105 HCHG RX REV CODE 258: Mod: UD | Performed by: EMERGENCY MEDICINE

## 2025-08-10 PROCEDURE — 36415 COLL VENOUS BLD VENIPUNCTURE: CPT

## 2025-08-10 PROCEDURE — 85025 COMPLETE CBC W/AUTO DIFF WBC: CPT

## 2025-08-10 PROCEDURE — 85610 PROTHROMBIN TIME: CPT

## 2025-08-10 PROCEDURE — 700102 HCHG RX REV CODE 250 W/ 637 OVERRIDE(OP): Mod: UD | Performed by: EMERGENCY MEDICINE

## 2025-08-10 PROCEDURE — 700111 HCHG RX REV CODE 636 W/ 250 OVERRIDE (IP): Mod: JZ,UD | Performed by: EMERGENCY MEDICINE

## 2025-08-10 RX ORDER — KETOROLAC TROMETHAMINE 15 MG/ML
15 INJECTION, SOLUTION INTRAMUSCULAR; INTRAVENOUS ONCE
Status: COMPLETED | OUTPATIENT
Start: 2025-08-10 | End: 2025-08-10

## 2025-08-10 RX ORDER — LOPERAMIDE HYDROCHLORIDE 2 MG/1
4 CAPSULE ORAL ONCE
Status: COMPLETED | OUTPATIENT
Start: 2025-08-10 | End: 2025-08-10

## 2025-08-10 RX ORDER — SODIUM CHLORIDE 9 MG/ML
1000 INJECTION, SOLUTION INTRAVENOUS ONCE
Status: COMPLETED | OUTPATIENT
Start: 2025-08-10 | End: 2025-08-10

## 2025-08-10 RX ORDER — ONDANSETRON 4 MG/1
4 TABLET, ORALLY DISINTEGRATING ORAL EVERY 6 HOURS PRN
Qty: 10 TABLET | Refills: 0 | Status: SHIPPED | OUTPATIENT
Start: 2025-08-10

## 2025-08-10 RX ORDER — ONDANSETRON 2 MG/ML
4 INJECTION INTRAMUSCULAR; INTRAVENOUS ONCE
Status: COMPLETED | OUTPATIENT
Start: 2025-08-10 | End: 2025-08-10

## 2025-08-10 RX ORDER — DICYCLOMINE HCL 20 MG
20 TABLET ORAL ONCE
Status: COMPLETED | OUTPATIENT
Start: 2025-08-10 | End: 2025-08-10

## 2025-08-10 RX ORDER — LOPERAMIDE HYDROCHLORIDE 2 MG/1
2 CAPSULE ORAL 4 TIMES DAILY PRN
Qty: 12 CAPSULE | Refills: 0 | Status: SHIPPED | OUTPATIENT
Start: 2025-08-10

## 2025-08-10 RX ADMIN — KETOROLAC TROMETHAMINE 15 MG: 15 INJECTION, SOLUTION INTRAMUSCULAR; INTRAVENOUS at 15:03

## 2025-08-10 RX ADMIN — LOPERAMIDE HYDROCHLORIDE 4 MG: 2 CAPSULE ORAL at 15:21

## 2025-08-10 RX ADMIN — ONDANSETRON 4 MG: 2 INJECTION INTRAMUSCULAR; INTRAVENOUS at 15:04

## 2025-08-10 RX ADMIN — SODIUM CHLORIDE 1000 ML: 9 INJECTION, SOLUTION INTRAVENOUS at 15:07

## 2025-08-10 RX ADMIN — DICYCLOMINE HYDROCHLORIDE 20 MG: 20 TABLET ORAL at 15:21

## 2025-08-10 ASSESSMENT — FIBROSIS 4 INDEX: FIB4 SCORE: 0.19

## (undated) DEVICE — CANNULA O2 COMFORT SOFT EAR ADULT 7 FT TUBING (50/CA)

## (undated) DEVICE — CANISTER SUCTION RIGID RED 1500CC (40EA/CA)

## (undated) DEVICE — SUTURE 0 VICRYL PLUS CT-2 - 8 X 18 INCH (12/BX)

## (undated) DEVICE — KIT  I.V. START (100EA/CA)

## (undated) DEVICE — FILM CASSETTE ENDO

## (undated) DEVICE — GUIDEWIRE

## (undated) DEVICE — STOCKINET TUBULAR 6IN STERILE - 6 X 48YDS (25/CA)

## (undated) DEVICE — TUBE E-T HI-LO CUFF 7.0MM (10EA/PK)

## (undated) DEVICE — SENSOR OXIMETER ADULT SPO2 RD SET (20EA/BX)

## (undated) DEVICE — SLEEVE VASO DVT COMPRESSION CALF MED - (10PR/CA)

## (undated) DEVICE — GLOVE BIOGEL PI INDICATOR SZ 8.0 SURGICAL PF LF -(50/BX 4BX/CA)

## (undated) DEVICE — WATER IRRIGATION STERILE 1000ML (12EA/CA)

## (undated) DEVICE — ELECTRODE 850 FOAM ADHESIVE - HYDROGEL RADIOTRNSPRNT (50/PK)

## (undated) DEVICE — ELECTRODE DUAL RETURN W/ CORD - (50/PK)

## (undated) DEVICE — KIT CUSTOM PROCEDURE SINGLE FOR ENDO  (15/CA)

## (undated) DEVICE — GVL 3 STAT DISPOSABLE - (10/BX)

## (undated) DEVICE — Device

## (undated) DEVICE — SHAVER 3.5 AGGRESSIVE + FORMLA (5EA/SP)

## (undated) DEVICE — SPLINT PLASTER 5 IN X 30 IN - (50EA/BX 6BX/CA)

## (undated) DEVICE — TOURNIQUET CUFF 34 X 4 ONE PORT DISP - STERILE (10/BX)

## (undated) DEVICE — MASK AERS ARLF ADLT UNDR THE - (50/CA)

## (undated) DEVICE — KIT ANESTHESIA W/CIRCUIT & 3/LT BAG W/FILTER (20EA/CA)

## (undated) DEVICE — MAT PATIENT POSITIONING PREVALON (10EA/CA)

## (undated) DEVICE — TUBE CONNECTING SUCTION - CLEAR PLASTIC STERILE 72 IN (50EA/CA)

## (undated) DEVICE — SET EXTENSION WITH 2 PORTS (48EA/CA) ***PART #2C8610 IS A SUBSTITUTE*****

## (undated) DEVICE — SYRINGE 30 ML LL (56/BX)

## (undated) DEVICE — TUBE CONNECT SUCTION CLEAR 120 X 1/4" (50EA/CA)"

## (undated) DEVICE — BLADE SURGICAL #10 - (50/BX)

## (undated) DEVICE — TUBING CLEARLINK DUO-VENT - C-FLO (48EA/CA)

## (undated) DEVICE — GLOVE SZ 8 BIOGEL PI MICRO - PF LF (50PR/BX)

## (undated) DEVICE — DRESSING ABDOMINAL PAD STERILE 8 X 10" (360EA/CA)"

## (undated) DEVICE — GOWN SURGEONS X-LARGE - DISP. (30/CA)

## (undated) DEVICE — SUTURE 3-0 ETHILON PS-1 (36PK/BX)

## (undated) DEVICE — BLADE SAW SMALL BONE AGGRESSIVE 31.0 X 9 X 0.38MM

## (undated) DEVICE — SUCTION INSTRUMENT YANKAUER BULBOUS TIP W/O VENT (50EA/CA)

## (undated) DEVICE — MASK OXYGEN VNYL ADLT MED CONC WITH 7 FOOT TUBING - (50EA/CA)

## (undated) DEVICE — CHLORAPREP 26 ML APPLICATOR - ORANGE TINT(25/CA)

## (undated) DEVICE — SODIUM CHL IRRIGATION 0.9% 1000ML (12EA/CA)

## (undated) DEVICE — GLOVE SZ 7.5 BIOGEL PI MICRO - PF LF (50PR/BX)

## (undated) DEVICE — DRAPE LARGE 3 QUARTER - (20/CA)

## (undated) DEVICE — CONTAINER SPECIMEN BAG OR - STERILE 4 OZ W/LID (100EA/CA)

## (undated) DEVICE — PACK MAJOR ORTHO TRAUMA- (3EA/CA)

## (undated) DEVICE — WRAP CO-FLEX 4IN X 5YD STERIL - SELF-ADHERENT (18/CA)

## (undated) DEVICE — BAG SPONGE COUNT 10.25 X 32 - BLUE (250/CA)

## (undated) DEVICE — SODIUM CHL. IRRIGATION 0.9% 3000ML (4EA/CA 65CA/PF)

## (undated) DEVICE — NEPTUNE 4 PORT MANIFOLD - (20/PK)

## (undated) DEVICE — DRAPE 36X28IN RAD CARM BND BG - (25/CA)

## (undated) DEVICE — BITE BLOCK, DISP.

## (undated) DEVICE — SYRINGE 10 ML CONTROL LL (25EA/BX 4BX/CA)

## (undated) DEVICE — PORT AUXILLARY WATER (50EA/BX)

## (undated) DEVICE — MASK PANORAMIC OXYGEN PRO2 (30EA/CA)

## (undated) DEVICE — LACTATED RINGERS INJ 1000 ML - (14EA/CA 60CA/PF)

## (undated) DEVICE — STOCKINET BIAS 6 IN STERILE - (20/CA)

## (undated) DEVICE — DRAPE C ARMOR (12EA/CA)

## (undated) DEVICE — SET LEADWIRE 5 LEAD BEDSIDE DISPOSABLE ECG (1SET OF 5/EA)

## (undated) DEVICE — COVER LIGHT HANDLE ALC PLUS DISP (18EA/BX)

## (undated) DEVICE — FORCEP RADIAL JAW 4 STANDARD CAPACITY W/NEEDLE 240CM (40EA/BX)

## (undated) DEVICE — PADDING CAST 6 IN STERILE - 6 X 4 YDS (24/CA)

## (undated) DEVICE — NEEDLE SAFETY 18 GA X 1 1/2 IN (100EA/BX)

## (undated) DEVICE — GLOVE SZ 6.5 BIOGEL PI MICRO - PF LF (50PR/BX)

## (undated) DEVICE — TOWEL STOP TIMEOUT SAFETY FLAG (40EA/CA)

## (undated) DEVICE — BIT DRILL DIA2MM SCALED FOR VARIAX 2 WRIST FUSION LOCKING PLATE SYSTEM

## (undated) DEVICE — DRAPE ARTHROSCOPE (ACL) - (10/CA)

## (undated) DEVICE — SUTURE 3-0 ETHILON FS-1 - (36/BX) 30 INCH

## (undated) DEVICE — MASK AIRWAY FLEXIBLE SINGLE-USE SIZE 4 ADULTS (10EA/BX)

## (undated) DEVICE — BUTTON ENDOSCOPY DISPOSABLE

## (undated) DEVICE — SUTURE 3-0 VICRYL PLUS SH - 8X 18 INCH (12/BX)

## (undated) DEVICE — MASK WITH FACE SHIELD (25/BX 4BX/CA)

## (undated) DEVICE — PAD PREP 24 X 48 CUFFED - (100/CA)

## (undated) DEVICE — BLADE SURGICAL #15 - (50/BX 3BX/CA)

## (undated) DEVICE — TUBE E-T HI-LO CUFF 8.0MM (10EA/PK)

## (undated) DEVICE — GLOVE BIOGEL PI INDICATOR SZ 6.5 SURGICAL PF LF - (50/BX 4BX/CA)

## (undated) DEVICE — CANISTER SUCTION 3000ML MECHANICAL FILTER AUTO SHUTOFF MEDI-VAC NONSTERILE LF DISP (40EA/CA)

## (undated) DEVICE — TUBING CASSETTE CROSSFLOW INTEGRATED (1/EA) ORDER MULTIPLES OF 10

## (undated) DEVICE — SUTURE GENERAL

## (undated) DEVICE — GOWN WARMING STANDARD FLEX - (30/CA)

## (undated) DEVICE — GLOVE BIOGEL PI INDICATOR SZ 7.0 SURGICAL PF LF - (50/BX 4BX/CA)

## (undated) DEVICE — PACK UPPER EXTREMITY (2EA/CA)

## (undated) DEVICE — CONTAINER, SPECIMEN, STERILE

## (undated) DEVICE — DRESSING 3X8 ADAPTIC GAUZE - NON-ADHERING (36/PK 6PK/BX)

## (undated) DEVICE — GLOVE BIOGEL PI INDICATOR SZ 8.5 SURGICAL PF LF - (50PR/BX 4BX/CA)

## (undated) DEVICE — DRAPE LOWER EXTREMETY - (6/CA)

## (undated) DEVICE — DRAPE 36X28IN RAD CARM BND BG - (25/CA) O

## (undated) DEVICE — GLOVE SZ 7 BIOGEL PI MICRO - PF LF (50PR/BX 4BX/CA)

## (undated) DEVICE — DRILL